# Patient Record
Sex: FEMALE | Race: WHITE | NOT HISPANIC OR LATINO
[De-identification: names, ages, dates, MRNs, and addresses within clinical notes are randomized per-mention and may not be internally consistent; named-entity substitution may affect disease eponyms.]

---

## 2017-01-25 ENCOUNTER — RX RENEWAL (OUTPATIENT)
Age: 57
End: 2017-01-25

## 2017-06-30 ENCOUNTER — OUTPATIENT (OUTPATIENT)
Dept: PREADMISSION | Facility: HOSPITAL | Age: 57
LOS: 1 days | Discharge: HOME | End: 2017-06-30

## 2017-06-30 DIAGNOSIS — R92.8 OTHER ABNORMAL AND INCONCLUSIVE FINDINGS ON DIAGNOSTIC IMAGING OF BREAST: ICD-10-CM

## 2017-07-06 ENCOUNTER — RX RENEWAL (OUTPATIENT)
Age: 57
End: 2017-07-06

## 2017-08-29 ENCOUNTER — APPOINTMENT (OUTPATIENT)
Dept: BREAST CENTER | Facility: CLINIC | Age: 57
End: 2017-08-29
Payer: MEDICAID

## 2017-08-29 VITALS
WEIGHT: 172 LBS | HEIGHT: 66 IN | DIASTOLIC BLOOD PRESSURE: 76 MMHG | SYSTOLIC BLOOD PRESSURE: 122 MMHG | BODY MASS INDEX: 27.64 KG/M2

## 2017-08-29 PROCEDURE — 99212 OFFICE O/P EST SF 10 MIN: CPT

## 2018-01-11 ENCOUNTER — RX RENEWAL (OUTPATIENT)
Age: 58
End: 2018-01-11

## 2018-01-29 ENCOUNTER — OUTPATIENT (OUTPATIENT)
Dept: OUTPATIENT SERVICES | Facility: HOSPITAL | Age: 58
LOS: 1 days | Discharge: HOME | End: 2018-01-29

## 2018-01-29 ENCOUNTER — APPOINTMENT (OUTPATIENT)
Dept: HEMATOLOGY ONCOLOGY | Facility: CLINIC | Age: 58
End: 2018-01-29

## 2018-01-29 VITALS
HEART RATE: 107 BPM | WEIGHT: 186 LBS | TEMPERATURE: 96.1 F | SYSTOLIC BLOOD PRESSURE: 145 MMHG | RESPIRATION RATE: 14 BRPM | HEIGHT: 66 IN | DIASTOLIC BLOOD PRESSURE: 67 MMHG | BODY MASS INDEX: 29.89 KG/M2

## 2018-01-29 DIAGNOSIS — M19.90 UNSPECIFIED OSTEOARTHRITIS, UNSPECIFIED SITE: ICD-10-CM

## 2018-01-29 DIAGNOSIS — Z87.09 PERSONAL HISTORY OF OTHER DISEASES OF THE RESPIRATORY SYSTEM: ICD-10-CM

## 2018-01-29 DIAGNOSIS — Z80.1 FAMILY HISTORY OF MALIGNANT NEOPLASM OF TRACHEA, BRONCHUS AND LUNG: ICD-10-CM

## 2018-01-31 DIAGNOSIS — C50.411 MALIGNANT NEOPLASM OF UPPER-OUTER QUADRANT OF RIGHT FEMALE BREAST: ICD-10-CM

## 2018-03-06 ENCOUNTER — APPOINTMENT (OUTPATIENT)
Dept: BREAST CENTER | Facility: CLINIC | Age: 58
End: 2018-03-06
Payer: MEDICAID

## 2018-03-06 VITALS
SYSTOLIC BLOOD PRESSURE: 136 MMHG | WEIGHT: 188 LBS | HEIGHT: 66 IN | DIASTOLIC BLOOD PRESSURE: 80 MMHG | BODY MASS INDEX: 30.22 KG/M2 | OXYGEN SATURATION: 96 % | HEART RATE: 112 BPM

## 2018-03-06 PROCEDURE — 99213 OFFICE O/P EST LOW 20 MIN: CPT

## 2018-07-10 ENCOUNTER — OUTPATIENT (OUTPATIENT)
Dept: OUTPATIENT SERVICES | Facility: HOSPITAL | Age: 58
LOS: 1 days | Discharge: HOME | End: 2018-07-10

## 2018-07-10 DIAGNOSIS — Z12.31 ENCOUNTER FOR SCREENING MAMMOGRAM FOR MALIGNANT NEOPLASM OF BREAST: ICD-10-CM

## 2018-07-11 DIAGNOSIS — F17.200 NICOTINE DEPENDENCE, UNSPECIFIED, UNCOMPLICATED: ICD-10-CM

## 2018-07-11 DIAGNOSIS — Z78.0 ASYMPTOMATIC MENOPAUSAL STATE: ICD-10-CM

## 2018-07-11 DIAGNOSIS — M89.9 DISORDER OF BONE, UNSPECIFIED: ICD-10-CM

## 2018-07-11 DIAGNOSIS — Z13.820 ENCOUNTER FOR SCREENING FOR OSTEOPOROSIS: ICD-10-CM

## 2018-07-25 ENCOUNTER — RX RENEWAL (OUTPATIENT)
Age: 58
End: 2018-07-25

## 2018-07-30 ENCOUNTER — APPOINTMENT (OUTPATIENT)
Dept: HEMATOLOGY ONCOLOGY | Facility: CLINIC | Age: 58
End: 2018-07-30

## 2018-07-30 ENCOUNTER — OUTPATIENT (OUTPATIENT)
Dept: OUTPATIENT SERVICES | Facility: HOSPITAL | Age: 58
LOS: 1 days | Discharge: HOME | End: 2018-07-30

## 2018-07-30 ENCOUNTER — LABORATORY RESULT (OUTPATIENT)
Age: 58
End: 2018-07-30

## 2018-07-30 VITALS
TEMPERATURE: 97.7 F | DIASTOLIC BLOOD PRESSURE: 67 MMHG | HEART RATE: 109 BPM | WEIGHT: 178 LBS | SYSTOLIC BLOOD PRESSURE: 112 MMHG | HEIGHT: 66 IN | RESPIRATION RATE: 16 BRPM | BODY MASS INDEX: 28.61 KG/M2

## 2018-07-30 DIAGNOSIS — Z87.440 PERSONAL HISTORY OF URINARY (TRACT) INFECTIONS: ICD-10-CM

## 2018-07-31 LAB
ALBUMIN SERPL ELPH-MCNC: 4.2 G/DL
ALP BLD-CCNC: 108 U/L
ALT SERPL-CCNC: 24 U/L
ANION GAP SERPL CALC-SCNC: 20 MMOL/L
APPEARANCE: ABNORMAL
AST SERPL-CCNC: 22 U/L
BILIRUB SERPL-MCNC: <0.2 MG/DL
BILIRUBIN URINE: ABNORMAL
BLOOD URINE: ABNORMAL
BUN SERPL-MCNC: 16 MG/DL
CALCIUM SERPL-MCNC: 10.1 MG/DL
CHLORIDE SERPL-SCNC: 99 MMOL/L
CO2 SERPL-SCNC: 24 MMOL/L
COLOR: NORMAL
CREAT SERPL-MCNC: 0.7 MG/DL
GLUCOSE QUALITATIVE U: NEGATIVE MG/DL
GLUCOSE SERPL-MCNC: 74 MG/DL
HCT VFR BLD CALC: 41.8 %
HGB BLD-MCNC: 13.5 G/DL
KETONES URINE: ABNORMAL
LEUKOCYTE ESTERASE URINE: ABNORMAL
MCHC RBC-ENTMCNC: 28.7 PG
MCHC RBC-ENTMCNC: 32.3 G/DL
MCV RBC AUTO: 88.7 FL
NITRITE URINE: POSITIVE
PH URINE: 6
PLATELET # BLD AUTO: 344 K/UL
PMV BLD: 10.7 FL
POTASSIUM SERPL-SCNC: 4.2 MMOL/L
PROT SERPL-MCNC: 6.9 G/DL
PROTEIN URINE: 100 MG/DL
RBC # BLD: 4.71 M/UL
RBC # FLD: 13.1 %
SODIUM SERPL-SCNC: 143 MMOL/L
SPECIFIC GRAVITY URINE: 1.02
UROBILINOGEN URINE: 0.2 MG/DL (ref 0.2–?)
WBC # FLD AUTO: 13.28 K/UL

## 2018-08-01 ENCOUNTER — RX RENEWAL (OUTPATIENT)
Age: 58
End: 2018-08-01

## 2018-08-01 DIAGNOSIS — N39.0 URINARY TRACT INFECTION, SITE NOT SPECIFIED: ICD-10-CM

## 2018-08-01 DIAGNOSIS — Z79.811 LONG TERM (CURRENT) USE OF AROMATASE INHIBITORS: ICD-10-CM

## 2018-08-01 DIAGNOSIS — C50.411 MALIGNANT NEOPLASM OF UPPER-OUTER QUADRANT OF RIGHT FEMALE BREAST: ICD-10-CM

## 2018-09-25 ENCOUNTER — APPOINTMENT (OUTPATIENT)
Dept: BREAST CENTER | Facility: CLINIC | Age: 58
End: 2018-09-25
Payer: MEDICAID

## 2018-09-25 VITALS
BODY MASS INDEX: 28.61 KG/M2 | HEIGHT: 66 IN | WEIGHT: 178 LBS | OXYGEN SATURATION: 96 % | SYSTOLIC BLOOD PRESSURE: 118 MMHG | DIASTOLIC BLOOD PRESSURE: 78 MMHG | HEART RATE: 100 BPM

## 2018-09-25 PROCEDURE — 99213 OFFICE O/P EST LOW 20 MIN: CPT

## 2019-01-28 ENCOUNTER — APPOINTMENT (OUTPATIENT)
Dept: HEMATOLOGY ONCOLOGY | Facility: CLINIC | Age: 59
End: 2019-01-28

## 2019-01-28 ENCOUNTER — OUTPATIENT (OUTPATIENT)
Dept: OUTPATIENT SERVICES | Facility: HOSPITAL | Age: 59
LOS: 1 days | Discharge: HOME | End: 2019-01-28

## 2019-01-28 VITALS
HEART RATE: 103 BPM | WEIGHT: 183 LBS | SYSTOLIC BLOOD PRESSURE: 124 MMHG | BODY MASS INDEX: 29.41 KG/M2 | TEMPERATURE: 96.3 F | HEIGHT: 66 IN | DIASTOLIC BLOOD PRESSURE: 76 MMHG

## 2019-01-29 DIAGNOSIS — C50.411 MALIGNANT NEOPLASM OF UPPER-OUTER QUADRANT OF RIGHT FEMALE BREAST: ICD-10-CM

## 2019-01-29 DIAGNOSIS — Z79.811 LONG TERM (CURRENT) USE OF AROMATASE INHIBITORS: ICD-10-CM

## 2019-02-04 NOTE — PAST MEDICAL HISTORY
[Postmenopausal] : The patient is postmenopausal [Menarche Age ____] : age at menarche was [unfilled] [Menopause Age____] : age at menopause was [unfilled] [Total Preg ___] : G[unfilled] [Live Births ___] : P[unfilled]  [Age At Live Birth ___] : Age at live birth: [unfilled] [History of Hormone Replacement Treatment] : has no history of hormone replacement treatment [FreeTextEntry5] : none [FreeTextEntry6] : none [FreeTextEntry7] : none [FreeTextEntry8] : none

## 2019-02-04 NOTE — ASSESSMENT
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 \par She is tolerating the treatment well.\par \par PLAN\par Continue Femara, Ca + Vit D\par Advised to stop smoking.\par Mammogram due in July 2019. DEXA scan to be repeated in july 2020.\par RTC in 6M\par Pt seen and examined with \par

## 2019-02-04 NOTE — RESULTS/DATA
[FreeTextEntry1] : \par EXAM: XR BONE DENSITY AXIAL \par \par \par PROCEDURE DATE: 07/10/2018 \par \par \par \par ================================================================= \par  Bone Density Report \par ================================================================= \par \par Age: 57 \par Sex: Female \par Ethnicity: White \par \par ----------------------------------------------------------------- \par \par Indication: postmenopausal; screening for osteoporosis; \[facility] ([place] \par Accession number: [id number] \[facility] \par Bone Density: \par ----------------------------------------------------------------- \par Region BMD T-score Z-score Classification \par ----------------------------------------------------------------- \par AP Spine (L1-L4) 0.874 -1.6 -0.2 Osteopenia \par Femoral Neck (Left) 0.790 -0.5 0.7 Normal \par Total Hip (Left) 0.887 -0.5 0.5 Normal \par -----------------------------------------------------------------.\par \par EXAM: MG MAMMO SCREEN BI \par \par \par PROCEDURE DATE: 07/10/2018 \par \par \par \par INTERPRETATION: HISTORY: \par Patient is 57 years old and is seen for screening. The patient has a \par history of right lumpectomy at age 55 - malignant. The patient has no \par family history of breast cancer. \par \par CLINICAL BREAST EXAM: \par The patient reports her last clinical breast exam was performed within the \par past month. \par \par FILMS COMPARED: \par The present examination has been compared to prior imaging studies performed \par at [facility] on 04/28/2016, 12/13/2016 and \par 06/30/2017. \par \par MAMMOGRAM FINDINGS: \par The following mammographic views were obtained: bilateral craniocaudal and \par bilateral mediolateral oblique. Computer-aided detection was utilized in \par the interpretation of this examination. \par \par There are scattered areas of fibroglandular density. \par \par There is an area of benign architectural distortion corresponding to the \par site of surgery and consistent with post operative fibrosis seen in the \par right breast. \par \par No suspicious masses, calcifications or other abnormalities are seen. \par \par When compared to prior studies there is no significant change. \par \par IMPRESSION: \par There is no evidence of malignancy. \par \par RECOMMENDATION: \par Unless otherwise indicated by clinical findings, annual screening \par mammography recommended. \par \par ASSESSMENT: \par BI-RADS Category 2: Benign\par

## 2019-02-04 NOTE — REVIEW OF SYSTEMS
[Dysuria] : dysuria [Negative] : Allergic/Immunologic [FreeTextEntry2] : last colonoscopy was in 2016, last GYn exam >10 years ago, refusing to go

## 2019-02-04 NOTE — HISTORY OF PRESENT ILLNESS
[Disease: _____________________] : Disease: [unfilled] [T: ___] : T[unfilled] [N: ___] : N[unfilled] [M: ___] : M[unfilled] [AJCC Stage: ____] : AJCC Stage: [unfilled] [de-identified] : Patient is a 57 year old year-old postmenopausal female transferring her care to me. \par \par She has history of ER and CO positive 0.6 mm well, differentiated, microinvasive ductal carcinoma  ER/CO (+), HER2 (-). status post surgery currently on adjuvant Femara started in July 2015. \par She is s/p Right NLOC/SNB/RF Spect/XOFT 06/15/15 - 0/1 (-); DCIS intermed grade, no residual IDC; (-) margins\par Patient is taking calcium and vitamin D .\par She is refusing Pap smears.\par No FHx breast/ovarian cancer.\par  [de-identified] : IDC [de-identified] : ER and OK positive, and her-2 negative [de-identified] : < 1mm (microinvasive)\par UOQ [de-identified] : She has not been here since 11/18/16. However she states she has been compliant with Femara.\par She does not exercise.\par Her appetite is good.\par She denies any adverse events.\par \par 7/30/18:\par Doing well. Mammo in July 2018 was normal. DEXA in July 2018 showed osteopenia. \par C/o burning and pain while passing urine. Started few days ago, resolved by itself and recurred last night. Did not take any Abx for UTI. \par Compliant with Letrozole. Denies fever, nausea, vomiting, chest pain, SOB, abdominal pain, bowel problems.\par Colonoscopy done in 2017 and it was normal. \par \par 01/28/2019\par Patient is here for a follow up visit. \par C/O of weight gain and increased appetite. But otherwise tolerating letrozole well. \par Mammogram due in  july 2019. \par Denies fever, nausea, vomiting, chest pain, SOB, abdominal pain, bowel problems.\par Colonoscopy done in 2017 and it was normal.

## 2019-05-13 ENCOUNTER — APPOINTMENT (OUTPATIENT)
Dept: BREAST CENTER | Facility: CLINIC | Age: 59
End: 2019-05-13

## 2019-05-14 ENCOUNTER — APPOINTMENT (OUTPATIENT)
Dept: BREAST CENTER | Facility: CLINIC | Age: 59
End: 2019-05-14

## 2019-06-17 ENCOUNTER — FORM ENCOUNTER (OUTPATIENT)
Age: 59
End: 2019-06-17

## 2019-06-18 ENCOUNTER — OUTPATIENT (OUTPATIENT)
Dept: OUTPATIENT SERVICES | Facility: HOSPITAL | Age: 59
LOS: 1 days | Discharge: HOME | End: 2019-06-18
Payer: MEDICAID

## 2019-06-18 DIAGNOSIS — N63.20 UNSPECIFIED LUMP IN THE LEFT BREAST, UNSPECIFIED QUADRANT: ICD-10-CM

## 2019-06-18 PROCEDURE — G0279: CPT | Mod: 26

## 2019-06-18 PROCEDURE — 77066 DX MAMMO INCL CAD BI: CPT | Mod: 26

## 2019-06-18 PROCEDURE — 76642 ULTRASOUND BREAST LIMITED: CPT | Mod: 26,LT

## 2019-06-18 PROCEDURE — 77062 BREAST TOMOSYNTHESIS BI: CPT | Mod: 26

## 2019-07-08 ENCOUNTER — APPOINTMENT (OUTPATIENT)
Dept: HEMATOLOGY ONCOLOGY | Facility: CLINIC | Age: 59
End: 2019-07-08
Payer: MEDICAID

## 2019-07-08 ENCOUNTER — LABORATORY RESULT (OUTPATIENT)
Age: 59
End: 2019-07-08

## 2019-07-08 ENCOUNTER — OUTPATIENT (OUTPATIENT)
Dept: OUTPATIENT SERVICES | Facility: HOSPITAL | Age: 59
LOS: 1 days | Discharge: HOME | End: 2019-07-08

## 2019-07-08 VITALS
HEART RATE: 89 BPM | HEIGHT: 66 IN | BODY MASS INDEX: 28.61 KG/M2 | DIASTOLIC BLOOD PRESSURE: 67 MMHG | WEIGHT: 178 LBS | TEMPERATURE: 96.2 F | SYSTOLIC BLOOD PRESSURE: 104 MMHG | RESPIRATION RATE: 16 BRPM

## 2019-07-08 LAB
HCT VFR BLD CALC: 44.3 %
HGB BLD-MCNC: 14.1 G/DL
MCHC RBC-ENTMCNC: 29.3 PG
MCHC RBC-ENTMCNC: 31.8 G/DL
MCV RBC AUTO: 91.9 FL
PLATELET # BLD AUTO: 401 K/UL
PMV BLD: 10.3 FL
RBC # BLD: 4.82 M/UL
RBC # FLD: 13 %
WBC # FLD AUTO: 9.57 K/UL

## 2019-07-08 PROCEDURE — 99214 OFFICE O/P EST MOD 30 MIN: CPT

## 2019-07-08 NOTE — PHYSICAL EXAM
[Fully active, able to carry on all pre-disease performance without restriction] : Status 0 - Fully active, able to carry on all pre-disease performance without restriction [Normal] : bilateral breasts without nipple retraction, skin dimpling or palpable masses; the bilateral axillae are without adenopathy

## 2019-07-09 LAB
ALBUMIN SERPL ELPH-MCNC: 4.3 G/DL
ALP BLD-CCNC: 94 U/L
ALT SERPL-CCNC: 29 U/L
ANION GAP SERPL CALC-SCNC: 10 MMOL/L
AST SERPL-CCNC: 21 U/L
BILIRUB SERPL-MCNC: <0.2 MG/DL
BUN SERPL-MCNC: 14 MG/DL
CALCIUM SERPL-MCNC: 9.5 MG/DL
CHLORIDE SERPL-SCNC: 102 MMOL/L
CO2 SERPL-SCNC: 28 MMOL/L
CREAT SERPL-MCNC: 0.8 MG/DL
FERRITIN SERPL-MCNC: 53 NG/ML
GLUCOSE SERPL-MCNC: 81 MG/DL
IRON SATN MFR SERPL: 67 %
IRON SERPL-MCNC: 248 UG/DL
POTASSIUM SERPL-SCNC: 4.5 MMOL/L
PROT SERPL-MCNC: 6.9 G/DL
SODIUM SERPL-SCNC: 140 MMOL/L
TIBC SERPL-MCNC: 372 UG/DL
UIBC SERPL-MCNC: 124 UG/DL

## 2019-07-12 NOTE — HISTORY OF PRESENT ILLNESS
[Disease: _____________________] : Disease: [unfilled] [T: ___] : T[unfilled] [N: ___] : N[unfilled] [M: ___] : M[unfilled] [AJCC Stage: ____] : AJCC Stage: [unfilled] [de-identified] : IDC [de-identified] : Patient is a 57 year old year-old postmenopausal female transferring her care to me. \par \par She has history of ER and OK positive 0.6 mm well, differentiated, microinvasive ductal carcinoma  ER/OK (+), HER2 (-). status post surgery currently on adjuvant Femara started in July 2015. \par She is s/p Right NLOC/SNB/RF Spect/XOFT 06/15/15 - 0/1 (-); DCIS intermed grade, no residual IDC; (-) margins\par Patient is taking calcium and vitamin D .\par She is refusing Pap smears.\par No FHx breast/ovarian cancer.\par  [de-identified] : < 1mm (microinvasive)\par UOQ [de-identified] : ER and MS positive, and her-2 negative [de-identified] : She has not been here since 11/18/16. However she states she has been compliant with Femara.\par She does not exercise.\par Her appetite is good.\par She denies any adverse events.\par \par 7/30/18:\par Doing well. Mammo in July 2018 was normal. DEXA in July 2018 showed osteopenia. \par C/o burning and pain while passing urine. Started few days ago, resolved by itself and recurred last night. Did not take any Abx for UTI. \par Compliant with Letrozole. Denies fever, nausea, vomiting, chest pain, SOB, abdominal pain, bowel problems.\par Colonoscopy done in 2017 and it was normal. \par \par 01/28/2019\par Patient is here for a follow up visit. \par C/O of weight gain and increased appetite. But otherwise tolerating letrozole well. \par Mammogram due in  july 2019. \par Denies fever, nausea, vomiting, chest pain, SOB, abdominal pain, bowel problems.\par Colonoscopy done in 2017 and it was normal. \par \par 7/8/19\par pt is here for follow up.\par Denies fever, nausea, vomiting, chest pain, SOB, abdominal pain, bowel problems.\par she had felt a lump under her left axilla, had mamamo/usg done neg as noted below.\par compliant with Femara.\par says she was diagnosed with iron deficiency last October??.\par states she was advised IV iron but has been taking po iron.\par Not taking Ca + D\par After extensive questioning pt finally admitted that she has been having vaginal spotting for several months.\par She has not seen a GYN in a long time

## 2019-07-12 NOTE — RESULTS/DATA
[FreeTextEntry1] :  US BREAST LIMITED LT \par EXAM: MG MAMMO DIAG W DARREN BI# \par \par \par PROCEDURE DATE: 06/18/2019 \par \par \par \par INTERPRETATION: Patient presents with an area of focal palpable abnormality \par in the left axilla. \par \par The patient reports her last clinical breast examination was performed \par within the last year. \par \par Spot magnification imaging of the symptomatic area was performed in addition \par to routine mammographic projections. 3D tomosynthesis images were obtained \par as well. \par \par Computer-aided detection was utilized in the interpretation of this \par examination. \par \par Comparison is made to multiple prior exams dating back to 4/6/2015. \par \par Breast composition:There are scattered areas of fibroglandular density. \par \par There are no suspicious masses, areas of architectural distortion or cluster \par of microcalcifications in either breast. The spot magnification views of the \par symptomatic area show no suspicious findings. Stable postlumpectomy changes \par with associated fat necrosis are noted in the right upper outer quadrant. \par \par Targeted left Breast Sonogram: \par \par 8 x 5 x 3 mm hypoechoic area is noted in the skin in the region of palpable \par concern within the left axilla. This may reflect a sebaceous cyst. \par \par IMPRESSION: \par \par Hypoechoic area in the left axilla is probably benign. Follow-up ultrasound \par in 3 months is recommended. \par \par Postlumpectomy changes in the right breast are benign. \par \par Recommendation: Follow-up ultrasound in 3 months. \par \par BI-RADS category 3: Probably Benign \par \par The findings and recommendations were discussed with the patient and all \par questions were answered. \par \par \par \par

## 2019-07-12 NOTE — ASSESSMENT
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 \par She is tolerating the treatment well.\par H/o Iron deficiency \par States she has vaginal bleeding\par \par PLAN\par Continue Femara, Ca + Vit D\par Advised to stop smoking.\par USG breast due in Sept 2019. DEXA scan to be repeated in july 2020.\par pelvic USG and GYN eval ASAP.\par Labs ordered today\par RTC in 2M\par \par

## 2019-07-12 NOTE — PAST MEDICAL HISTORY
[Menarche Age ____] : age at menarche was [unfilled] [Postmenopausal] : The patient is postmenopausal [Total Preg ___] : G[unfilled] [Menopause Age____] : age at menopause was [unfilled] [Age At Live Birth ___] : Age at live birth: [unfilled] [Live Births ___] : P[unfilled]  [FreeTextEntry5] : none [History of Hormone Replacement Treatment] : has no history of hormone replacement treatment [FreeTextEntry6] : none [FreeTextEntry8] : none [FreeTextEntry7] : none

## 2019-07-15 DIAGNOSIS — Z79.811 LONG TERM (CURRENT) USE OF AROMATASE INHIBITORS: ICD-10-CM

## 2019-07-15 DIAGNOSIS — N93.9 ABNORMAL UTERINE AND VAGINAL BLEEDING, UNSPECIFIED: ICD-10-CM

## 2019-07-15 DIAGNOSIS — C50.411 MALIGNANT NEOPLASM OF UPPER-OUTER QUADRANT OF RIGHT FEMALE BREAST: ICD-10-CM

## 2019-07-16 ENCOUNTER — APPOINTMENT (OUTPATIENT)
Dept: BREAST CENTER | Facility: CLINIC | Age: 59
End: 2019-07-16
Payer: MEDICAID

## 2019-07-16 VITALS
TEMPERATURE: 98.5 F | SYSTOLIC BLOOD PRESSURE: 134 MMHG | HEIGHT: 66 IN | DIASTOLIC BLOOD PRESSURE: 86 MMHG | BODY MASS INDEX: 28.61 KG/M2 | WEIGHT: 178 LBS

## 2019-07-16 PROCEDURE — 99213 OFFICE O/P EST LOW 20 MIN: CPT

## 2019-07-17 NOTE — DATA REVIEWED
[FreeTextEntry1] : B/L Dx Mammo & Left Sono - 06/18/19:\par Breast composition:There are scattered areas of fibroglandular density. \par \par There are no suspicious masses, areas of architectural distortion or cluster \par of microcalcifications in either breast. The spot magnification views of the \par symptomatic area show no suspicious findings. Stable postlumpectomy changes \par with associated fat necrosis are noted in the right upper outer quadrant. \par \par Targeted left Breast Sonogram: \par \par 8 x 5 x 3 mm hypoechoic area is noted in the skin in the region of palpable \par concern within the left axilla. This may reflect a sebaceous cyst. \par \par IMPRESSION: \par \par Hypoechoic area in the left axilla is probably benign. Follow-up ultrasound \par in 3 months is recommended. \par \par Postlumpectomy changes in the right breast are benign. \par \par Recommendation: Follow-up ultrasound in 3 months. \par \par BI-RADS category 3: Probably Benign

## 2019-07-17 NOTE — ASSESSMENT
[FreeTextEntry1] : JUAN ANTONIO is a rio 58 year old patient who presented today in follow up for a history of right breast cancer. \par She has been doing well with no new breast related complaints. \par Imaging was done recently, which revealed stable postlumpectomy changes with associated fat necrosis are noted in the right upper outer quadrant; 8 x 5 x 3 mm hypoechoic area is noted in the skin in the region of palpable concern within the left axilla. This may reflect a sebaceous cyst, as detailed above. \par Physical exam shows small sebaceous cyst noted in left axilla (correlates with imaging); \par no evidence of infection.\par \par Follow-up imaging with a left breast/axilla sonogram will be ordered for September 2019,\par and that will be scheduled today. \par All questions answered. She knows to call with any concerns.\par She will return for follow-up and clinical breast exam in six months with Dr. Auguste.\par She will continue periodic follow-up with medical oncology as well. \par As per recent medical oncology note, pt has episodes of vaginal bleeding - currently being evaluated.

## 2019-07-17 NOTE — PHYSICAL EXAM
[Normocephalic] : normocephalic [Atraumatic] : atraumatic [No dominant masses] : no dominant masses in right breast  [No dominant masses] : no dominant masses left breast [No Nipple Discharge] : no left nipple discharge [No Rashes] : no rashes [No Ulceration] : no ulceration [Breast Nipple Inversion] : nipples not inverted [Breast Nipple Retraction] : nipples not retracted [de-identified] : well healed surgical scars.\par s/p NLOC / SNB.  [de-identified] : No axillary lymphadenopathy appreciated. [de-identified] : No axillary lymphadenopathy appreciated.

## 2019-07-17 NOTE — HISTORY OF PRESENT ILLNESS
[FreeTextEntry1] : Patient with Right focus of well to mod diff IDC (0.6 mm)/DCIS intermed on Stereo 4/6/15; RUOQ (tophat).  ER/[place] (+), HER2 (-).\par No FHx breast/ovarian cancer.\par s/p Right NLOC/SNB/RF Spect/XOFT 06/15/15 - 0/1 (-); DCIS intermed grade, no residual IDC; (-) margins.\par Dr. Hall - Letrozole. \par \par JUAN ANTONIO OSHEA is a 58 year old female patient who presents today in follow up for history of right breast cancer.\par Since her last visit, she has no new breast related concerns. \par Imaging was done on 06/18/19, which revealed stable postlumpectomy changes with associated fat necrosis are noted in the right upper outer quadrant; 8 x 5 x 3 mm hypoechoic area is noted in the skin in the region of palpable concern within the left axilla. This may reflect a sebaceous cyst. .\par \par She presents today for evaluation and imaging review.

## 2019-09-03 ENCOUNTER — RECORD ABSTRACTING (OUTPATIENT)
Age: 59
End: 2019-09-03

## 2019-09-09 ENCOUNTER — APPOINTMENT (OUTPATIENT)
Dept: HEMATOLOGY ONCOLOGY | Facility: CLINIC | Age: 59
End: 2019-09-09

## 2020-01-15 ENCOUNTER — APPOINTMENT (OUTPATIENT)
Dept: BREAST CENTER | Facility: CLINIC | Age: 60
End: 2020-01-15

## 2020-02-10 ENCOUNTER — APPOINTMENT (OUTPATIENT)
Dept: HEMATOLOGY ONCOLOGY | Facility: CLINIC | Age: 60
End: 2020-02-10
Payer: MEDICAID

## 2020-02-10 VITALS
WEIGHT: 180 LBS | SYSTOLIC BLOOD PRESSURE: 134 MMHG | TEMPERATURE: 98.5 F | BODY MASS INDEX: 28.93 KG/M2 | HEIGHT: 66 IN | HEART RATE: 111 BPM | DIASTOLIC BLOOD PRESSURE: 71 MMHG

## 2020-02-10 PROCEDURE — 99214 OFFICE O/P EST MOD 30 MIN: CPT

## 2020-02-10 NOTE — HISTORY OF PRESENT ILLNESS
[T: ___] : T[unfilled] [Disease: _____________________] : Disease: [unfilled] [N: ___] : N[unfilled] [M: ___] : M[unfilled] [AJCC Stage: ____] : AJCC Stage: [unfilled] [de-identified] : Patient is a 57 year old year-old postmenopausal female transferring her care to me. \par \par She has history of ER and OR positive 0.6 mm well, differentiated, microinvasive ductal carcinoma  ER/OR (+), HER2 (-). status post surgery currently on adjuvant Femara started in July 2015. \par She is s/p Right NLOC/SNB/RF Spect/XOFT 06/15/15 - 0/1 (-); DCIS intermed grade, no residual IDC; (-) margins\par Patient is taking calcium and vitamin D .\par She is refusing Pap smears.\par No FHx breast/ovarian cancer.\par  [de-identified] : IDC [de-identified] : ER and NV positive, and her-2 negative [de-identified] : < 1mm (microinvasive)\par UOQ [de-identified] : She has not been here since 11/18/16. However she states she has been compliant with Femara.\par She does not exercise.\par Her appetite is good.\par She denies any adverse events.\par \par 7/30/18:\par Doing well. Mammo in July 2018 was normal. DEXA in July 2018 showed osteopenia. \par C/o burning and pain while passing urine. Started few days ago, resolved by itself and recurred last night. Did not take any Abx for UTI. \par Compliant with Letrozole. Denies fever, nausea, vomiting, chest pain, SOB, abdominal pain, bowel problems.\par Colonoscopy done in 2017 and it was normal. \par \par 01/28/2019\par Patient is here for a follow up visit. \par C/O of weight gain and increased appetite. But otherwise tolerating letrozole well. \par Mammogram due in  july 2019. \par Denies fever, nausea, vomiting, chest pain, SOB, abdominal pain, bowel problems.\par Colonoscopy done in 2017 and it was normal. \par \par 7/8/19\par pt is here for follow up.\par Denies fever, nausea, vomiting, chest pain, SOB, abdominal pain, bowel problems.\par she had felt a lump under her left axilla, had mamamo/usg done neg as noted below.\par compliant with Femara.\par says she was diagnosed with iron deficiency last October??.\par states she was advised IV iron but has been taking po iron.\par Not taking Ca + D\par After extensive questioning pt finally admitted that she has been having vaginal spotting for several months.\par She has not seen a GYN in a long time\par \par 2/10/20\par  Patient here for follow up.  She denies any new complaints.  Patient denies any new palpable breast lumps or pain, denies skin changes, denies nipple discharge.  Patient denies cough, shortness of breath, denies fever, denies bone pain.\par compliant with letrozole.\par Still has vaginal spotting off and on.\par Did not go for USG of pelvis or GYN eval as advised previously.\par Pt states that this is not her first priority.\par Continues to smoke.\par No abdominal pain\par

## 2020-02-10 NOTE — RESULTS/DATA
[FreeTextEntry1] : EXAM: US BREAST LIMITED LT \par EXAM: MG MAMMO DIAG W DARREN BI# \par PROCEDURE DATE: 06/18/2019 \par INTERPRETATION: Patient presents with an area of focal palpable abnormality in the left axilla. \par The patient reports her last clinical breast examination was performed within the last year. \par Spot magnification imaging of the symptomatic area was performed in addition to routine mammographic projections. 3D \par tomosynthesis images were obtained as well. \par Computer-aided detection was utilized in the interpretation of this examination. \par Comparison is made to multiple prior exams dating back to 4/6/2015. \par Breast composition:There are scattered areas of fibroglandular density. \par There are no suspicious masses, areas of architectural distortion or cluster of microcalcifications in either breast. The spot \par magnification views of the symptomatic area show no suspicious findings. Stable postlumpectomy changes with \par associated fat necrosis are noted in the right upper outer quadrant. \par Targeted left Breast Sonogram: \par 8 x 5 x 3 mm hypoechoic area is noted in the skin in the region of palpable concern within the left axilla. This may reflect a \par sebaceous cyst. \par IMPRESSION: \par Hypoechoic area in the left axilla is probably benign. Follow-up ultrasound in 3 months is recommended. \par Postlumpectomy changes in the right breast are benign. \par Recommendation: Follow-up ultrasound in 3 months. \par BI-RADS category 3: Probably Benign

## 2020-02-10 NOTE — PAST MEDICAL HISTORY
[Postmenopausal] : The patient is postmenopausal [Menarche Age ____] : age at menarche was [unfilled] [Menopause Age____] : age at menopause was [unfilled] [Age At Live Birth ___] : Age at live birth: [unfilled] [Live Births ___] : P[unfilled]  [Total Preg ___] : G[unfilled] [History of Hormone Replacement Treatment] : has no history of hormone replacement treatment [FreeTextEntry6] : none [FreeTextEntry5] : none [FreeTextEntry7] : none [FreeTextEntry8] : none

## 2020-02-10 NOTE — ASSESSMENT
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 \par She is tolerating the treatment well.\par H/o Iron deficiency \par States she has vaginal bleeding\par \par PLAN\par Continue Femara, Ca + Vit D.\par The side effects from such treatment were discussed in detail including but not limited to hot flashes, weight gain, hair thinning, arthralgia, myalgia, bone loss, increased risk of osteoporotic fractures and thrombo-embolism.\par She will take Ca + VIt D daily while on AI.\par Her compliance and any side effects from such treatment were assessed at today's visit\par \par Advised to stop smoking.\par  DEXA scan to be repeated in july 2020.\par reminded to go for breast USG again.\par PT IS VERY NON COMPLIANT\par pelvic USG and GYN eval ASAP.\par Labs ordered today\par RTC in 2M\par \par

## 2020-02-14 ENCOUNTER — LABORATORY RESULT (OUTPATIENT)
Age: 60
End: 2020-02-14

## 2020-02-14 ENCOUNTER — APPOINTMENT (OUTPATIENT)
Dept: GYNECOLOGIC ONCOLOGY | Facility: CLINIC | Age: 60
End: 2020-02-14
Payer: MEDICAID

## 2020-02-14 ENCOUNTER — OUTPATIENT (OUTPATIENT)
Dept: OUTPATIENT SERVICES | Facility: HOSPITAL | Age: 60
LOS: 1 days | Discharge: HOME | End: 2020-02-14

## 2020-02-14 DIAGNOSIS — N95.0 POSTMENOPAUSAL BLEEDING: ICD-10-CM

## 2020-02-14 DIAGNOSIS — N93.9 ABNORMAL UTERINE AND VAGINAL BLEEDING, UNSPECIFIED: ICD-10-CM

## 2020-02-14 PROCEDURE — 58100 BIOPSY OF UTERUS LINING: CPT

## 2020-02-14 PROCEDURE — 99205 OFFICE O/P NEW HI 60 MIN: CPT | Mod: 25

## 2020-02-14 NOTE — ASSESSMENT
[FreeTextEntry1] : 59 P3, smoker, h/o ER/VT pos breast cancer, with 1 year of PMB\par -sent for medical clearance\par -sched for Hyst D&C 3/2\par -f/u Pap/Embx\par -advised to stop smoking\par -discussed with the patient the possible outcomes of the Embx and D&C, all questions answered

## 2020-02-14 NOTE — PHYSICAL EXAM
[Abnormal] : Cervix: Abnormal [Normal] : Bimanual Exam: Normal [de-identified] : dark red blood at the cervix, no blanca bleeding [de-identified] : 5cc dark red blood in the vagina

## 2020-02-14 NOTE — HISTORY OF PRESENT ILLNESS
[FreeTextEntry1] : This is a 59 year-old smoking, non-compliant postmenopausal female with microinvasive ER/IN + breast cancer on adjuvant Femara since July 2015\par \par CC: vaginal spotting\par \par Referred by: Dr. Hall\par \par Pathology: \par \par Last Pap Smear: 20 years ago\par Last mammogram: 6/2019 BIRADS 3\par Last colonoscopy: \par \par Imaging:\par \par The patient comes today to discuss management recommendations.\par She has no complaints of pelvic pain; presently she is having bleeding, she states for 1 year, pink discharge. Denies blanca red blood. LMP was at menopause 10-12 years ago. \par She has normal appetite and normal oral intake. She is a smoker, trying to quit.\par She has no stress urinary incontinence, no urge incontinence, no hematuria\par She has regular bowel movements and no blood per rectum

## 2020-02-14 NOTE — PROCEDURE
[Endometrial Biopsy] : an endometrial biopsy [Cervical Pap] : cervical pap smear  [Postmenopausal Bleeding] : postmenopausal bleeding [Written consent] : written consent was obtained prior to the procedure and is detailed in the patient's record [Patient] : the patient [Yes] : the specimen was sent to pathology [None] : none [No Complications] : none [Tolerated Well] : the patient tolerated the procedure well [FreeTextEntry1] : Pt requested to stop after the pipelle was placed in the uterus, scant tissue obtained.

## 2020-02-17 ENCOUNTER — LABORATORY RESULT (OUTPATIENT)
Age: 60
End: 2020-02-17

## 2020-02-20 LAB — HPV HIGH+LOW RISK DNA PNL CVX: DETECTED

## 2020-02-24 ENCOUNTER — OUTPATIENT (OUTPATIENT)
Dept: OUTPATIENT SERVICES | Facility: HOSPITAL | Age: 60
LOS: 1 days | Discharge: HOME | End: 2020-02-24
Payer: MEDICAID

## 2020-02-24 VITALS
WEIGHT: 181.44 LBS | SYSTOLIC BLOOD PRESSURE: 116 MMHG | OXYGEN SATURATION: 94 % | DIASTOLIC BLOOD PRESSURE: 79 MMHG | HEIGHT: 66 IN | HEART RATE: 97 BPM | RESPIRATION RATE: 18 BRPM | TEMPERATURE: 98 F

## 2020-02-24 DIAGNOSIS — Z90.49 ACQUIRED ABSENCE OF OTHER SPECIFIED PARTS OF DIGESTIVE TRACT: Chronic | ICD-10-CM

## 2020-02-24 DIAGNOSIS — Z01.818 ENCOUNTER FOR OTHER PREPROCEDURAL EXAMINATION: ICD-10-CM

## 2020-02-24 DIAGNOSIS — Z98.890 OTHER SPECIFIED POSTPROCEDURAL STATES: Chronic | ICD-10-CM

## 2020-02-24 DIAGNOSIS — N95.0 POSTMENOPAUSAL BLEEDING: ICD-10-CM

## 2020-02-24 LAB
ALBUMIN SERPL ELPH-MCNC: 4.5 G/DL — SIGNIFICANT CHANGE UP (ref 3.5–5.2)
ALP SERPL-CCNC: 96 U/L — SIGNIFICANT CHANGE UP (ref 30–115)
ALT FLD-CCNC: 19 U/L — SIGNIFICANT CHANGE UP (ref 0–41)
ANION GAP SERPL CALC-SCNC: 15 MMOL/L — HIGH (ref 7–14)
APPEARANCE UR: CLEAR — SIGNIFICANT CHANGE UP
APTT BLD: 27.3 SEC — SIGNIFICANT CHANGE UP (ref 27–39.2)
AST SERPL-CCNC: 16 U/L — SIGNIFICANT CHANGE UP (ref 0–41)
BACTERIA # UR AUTO: ABNORMAL
BASOPHILS # BLD AUTO: 0.05 K/UL — SIGNIFICANT CHANGE UP (ref 0–0.2)
BASOPHILS NFR BLD AUTO: 0.7 % — SIGNIFICANT CHANGE UP (ref 0–1)
BILIRUB SERPL-MCNC: <0.2 MG/DL — SIGNIFICANT CHANGE UP (ref 0.2–1.2)
BILIRUB UR-MCNC: NEGATIVE — SIGNIFICANT CHANGE UP
BUN SERPL-MCNC: 15 MG/DL — SIGNIFICANT CHANGE UP (ref 10–20)
CALCIUM SERPL-MCNC: 10.6 MG/DL — HIGH (ref 8.5–10.1)
CHLORIDE SERPL-SCNC: 101 MMOL/L — SIGNIFICANT CHANGE UP (ref 98–110)
CO2 SERPL-SCNC: 27 MMOL/L — SIGNIFICANT CHANGE UP (ref 17–32)
COLOR SPEC: YELLOW — SIGNIFICANT CHANGE UP
CREAT SERPL-MCNC: 0.7 MG/DL — SIGNIFICANT CHANGE UP (ref 0.7–1.5)
DIFF PNL FLD: ABNORMAL
EOSINOPHIL # BLD AUTO: 0.48 K/UL — SIGNIFICANT CHANGE UP (ref 0–0.7)
EOSINOPHIL NFR BLD AUTO: 6.3 % — SIGNIFICANT CHANGE UP (ref 0–8)
EPI CELLS # UR: 1 /HPF — SIGNIFICANT CHANGE UP (ref 0–5)
GLUCOSE SERPL-MCNC: 92 MG/DL — SIGNIFICANT CHANGE UP (ref 70–99)
GLUCOSE UR QL: NEGATIVE — SIGNIFICANT CHANGE UP
HCT VFR BLD CALC: 43.7 % — SIGNIFICANT CHANGE UP (ref 37–47)
HGB BLD-MCNC: 14.4 G/DL — SIGNIFICANT CHANGE UP (ref 12–16)
HYALINE CASTS # UR AUTO: 19 /LPF — HIGH (ref 0–7)
IMM GRANULOCYTES NFR BLD AUTO: 0.3 % — SIGNIFICANT CHANGE UP (ref 0.1–0.3)
INR BLD: 0.89 RATIO — SIGNIFICANT CHANGE UP (ref 0.65–1.3)
KETONES UR-MCNC: NEGATIVE — SIGNIFICANT CHANGE UP
LEUKOCYTE ESTERASE UR-ACNC: ABNORMAL
LYMPHOCYTES # BLD AUTO: 2.3 K/UL — SIGNIFICANT CHANGE UP (ref 1.2–3.4)
LYMPHOCYTES # BLD AUTO: 30.1 % — SIGNIFICANT CHANGE UP (ref 20.5–51.1)
MCHC RBC-ENTMCNC: 29.9 PG — SIGNIFICANT CHANGE UP (ref 27–31)
MCHC RBC-ENTMCNC: 33 G/DL — SIGNIFICANT CHANGE UP (ref 32–37)
MCV RBC AUTO: 90.7 FL — SIGNIFICANT CHANGE UP (ref 81–99)
MONOCYTES # BLD AUTO: 0.46 K/UL — SIGNIFICANT CHANGE UP (ref 0.1–0.6)
MONOCYTES NFR BLD AUTO: 6 % — SIGNIFICANT CHANGE UP (ref 1.7–9.3)
NEUTROPHILS # BLD AUTO: 4.33 K/UL — SIGNIFICANT CHANGE UP (ref 1.4–6.5)
NEUTROPHILS NFR BLD AUTO: 56.6 % — SIGNIFICANT CHANGE UP (ref 42.2–75.2)
NITRITE UR-MCNC: POSITIVE
NRBC # BLD: 0 /100 WBCS — SIGNIFICANT CHANGE UP (ref 0–0)
PH UR: 6.5 — SIGNIFICANT CHANGE UP (ref 5–8)
PLATELET # BLD AUTO: 360 K/UL — SIGNIFICANT CHANGE UP (ref 130–400)
POTASSIUM SERPL-MCNC: 4.7 MMOL/L — SIGNIFICANT CHANGE UP (ref 3.5–5)
POTASSIUM SERPL-SCNC: 4.7 MMOL/L — SIGNIFICANT CHANGE UP (ref 3.5–5)
PROT SERPL-MCNC: 7.1 G/DL — SIGNIFICANT CHANGE UP (ref 6–8)
PROT UR-MCNC: ABNORMAL
PROTHROM AB SERPL-ACNC: 10.2 SEC — SIGNIFICANT CHANGE UP (ref 9.95–12.87)
RBC # BLD: 4.82 M/UL — SIGNIFICANT CHANGE UP (ref 4.2–5.4)
RBC # FLD: 12.3 % — SIGNIFICANT CHANGE UP (ref 11.5–14.5)
RBC CASTS # UR COMP ASSIST: 2 /HPF — SIGNIFICANT CHANGE UP (ref 0–4)
SODIUM SERPL-SCNC: 143 MMOL/L — SIGNIFICANT CHANGE UP (ref 135–146)
SP GR SPEC: 1.03 — HIGH (ref 1.01–1.02)
UROBILINOGEN FLD QL: SIGNIFICANT CHANGE UP
WBC # BLD: 7.64 K/UL — SIGNIFICANT CHANGE UP (ref 4.8–10.8)
WBC # FLD AUTO: 7.64 K/UL — SIGNIFICANT CHANGE UP (ref 4.8–10.8)
WBC UR QL: 155 /HPF — HIGH (ref 0–5)

## 2020-02-24 PROCEDURE — 71046 X-RAY EXAM CHEST 2 VIEWS: CPT | Mod: 26

## 2020-02-24 PROCEDURE — 93010 ELECTROCARDIOGRAM REPORT: CPT

## 2020-02-24 RX ORDER — LETROZOLE 2.5 MG/1
1 TABLET, FILM COATED ORAL
Qty: 0 | Refills: 0 | DISCHARGE

## 2020-02-24 NOTE — H&P PST ADULT - NSICDXPASTMEDICALHX_GEN_ALL_CORE_FT
PAST MEDICAL HISTORY:  Asthma last attack many yrs ago    Breast cancer right- s/p radiation and lumpectomy    COPD (chronic obstructive pulmonary disease)     OA (osteoarthritis)     Seasonal allergies

## 2020-02-24 NOTE — H&P PST ADULT - REASON FOR ADMISSION
60 Y/O FEMALE HERE FOR PRE-ADMISSION SURGICAL TESTING. PATIENT REPORTS VAGINAL BLEEDING FOR A FEW YEARS.  NOW FOR SCHEDULED PROCEDURE.

## 2020-02-26 DIAGNOSIS — N95.0 POSTMENOPAUSAL BLEEDING: ICD-10-CM

## 2020-02-26 DIAGNOSIS — N93.9 ABNORMAL UTERINE AND VAGINAL BLEEDING, UNSPECIFIED: ICD-10-CM

## 2020-02-28 NOTE — ASU PATIENT PROFILE, ADULT - PMH
Asthma  last attack many yrs ago  Breast cancer  right- s/p radiation and lumpectomy  COPD (chronic obstructive pulmonary disease)    OA (osteoarthritis)    Seasonal allergies

## 2020-03-02 ENCOUNTER — OUTPATIENT (OUTPATIENT)
Dept: OUTPATIENT SERVICES | Facility: HOSPITAL | Age: 60
LOS: 1 days | Discharge: HOME | End: 2020-03-02
Payer: MEDICAID

## 2020-03-02 ENCOUNTER — RESULT REVIEW (OUTPATIENT)
Age: 60
End: 2020-03-02

## 2020-03-02 VITALS
DIASTOLIC BLOOD PRESSURE: 72 MMHG | HEART RATE: 95 BPM | RESPIRATION RATE: 18 BRPM | HEIGHT: 66 IN | TEMPERATURE: 98 F | SYSTOLIC BLOOD PRESSURE: 116 MMHG | WEIGHT: 181.44 LBS | OXYGEN SATURATION: 95 %

## 2020-03-02 VITALS
DIASTOLIC BLOOD PRESSURE: 68 MMHG | HEART RATE: 85 BPM | RESPIRATION RATE: 12 BRPM | OXYGEN SATURATION: 97 % | SYSTOLIC BLOOD PRESSURE: 145 MMHG

## 2020-03-02 DIAGNOSIS — Z90.49 ACQUIRED ABSENCE OF OTHER SPECIFIED PARTS OF DIGESTIVE TRACT: Chronic | ICD-10-CM

## 2020-03-02 DIAGNOSIS — Z90.89 ACQUIRED ABSENCE OF OTHER ORGANS: Chronic | ICD-10-CM

## 2020-03-02 DIAGNOSIS — Z98.890 OTHER SPECIFIED POSTPROCEDURAL STATES: Chronic | ICD-10-CM

## 2020-03-02 PROCEDURE — 57520 CONIZATION OF CERVIX: CPT

## 2020-03-02 PROCEDURE — 88305 TISSUE EXAM BY PATHOLOGIST: CPT | Mod: 26

## 2020-03-02 PROCEDURE — 88307 TISSUE EXAM BY PATHOLOGIST: CPT | Mod: 26

## 2020-03-02 RX ORDER — CYCLOBENZAPRINE HYDROCHLORIDE 10 MG/1
1 TABLET, FILM COATED ORAL
Qty: 0 | Refills: 0 | DISCHARGE

## 2020-03-02 RX ORDER — HYDROMORPHONE HYDROCHLORIDE 2 MG/ML
0.5 INJECTION INTRAMUSCULAR; INTRAVENOUS; SUBCUTANEOUS
Refills: 0 | Status: DISCONTINUED | OUTPATIENT
Start: 2020-03-02 | End: 2020-03-02

## 2020-03-02 RX ORDER — ONDANSETRON 8 MG/1
4 TABLET, FILM COATED ORAL ONCE
Refills: 0 | Status: DISCONTINUED | OUTPATIENT
Start: 2020-03-02 | End: 2020-03-17

## 2020-03-02 RX ORDER — SODIUM CHLORIDE 9 MG/ML
1000 INJECTION, SOLUTION INTRAVENOUS
Refills: 0 | Status: DISCONTINUED | OUTPATIENT
Start: 2020-03-02 | End: 2020-03-17

## 2020-03-02 RX ORDER — IPRATROPIUM/ALBUTEROL SULFATE 18-103MCG
3 AEROSOL WITH ADAPTER (GRAM) INHALATION
Qty: 0 | Refills: 0 | DISCHARGE

## 2020-03-02 RX ORDER — ALBUTEROL 90 UG/1
2 AEROSOL, METERED ORAL
Qty: 0 | Refills: 0 | DISCHARGE

## 2020-03-02 RX ORDER — LETROZOLE 2.5 MG/1
1 TABLET, FILM COATED ORAL
Qty: 0 | Refills: 0 | DISCHARGE

## 2020-03-02 RX ORDER — MONTELUKAST 4 MG/1
1 TABLET, CHEWABLE ORAL
Qty: 0 | Refills: 0 | DISCHARGE

## 2020-03-02 RX ORDER — METHENAMINE MANDELATE 1 G
1 TABLET ORAL
Qty: 0 | Refills: 0 | DISCHARGE

## 2020-03-02 RX ORDER — DICLOFENAC SODIUM 30 MG/G
0 GEL TOPICAL
Qty: 0 | Refills: 0 | DISCHARGE

## 2020-03-02 RX ORDER — UMECLIDINIUM BROMIDE AND VILANTEROL TRIFENATATE 62.5; 25 UG/1; UG/1
1 POWDER RESPIRATORY (INHALATION)
Qty: 0 | Refills: 0 | DISCHARGE

## 2020-03-02 RX ORDER — OXYCODONE HYDROCHLORIDE 5 MG/1
5 TABLET ORAL ONCE
Refills: 0 | Status: DISCONTINUED | OUTPATIENT
Start: 2020-03-02 | End: 2020-03-02

## 2020-03-02 RX ADMIN — SODIUM CHLORIDE 100 MILLILITER(S): 9 INJECTION, SOLUTION INTRAVENOUS at 13:03

## 2020-03-02 RX ADMIN — OXYCODONE HYDROCHLORIDE 5 MILLIGRAM(S): 5 TABLET ORAL at 13:25

## 2020-03-02 NOTE — ASU DISCHARGE PLAN (ADULT/PEDIATRIC) - CARE PROVIDER_API CALL
Padilla Mitchell)  Gynecologic Oncology; Obstetrics and Gynecology  [address] [place] [address]  Phone: [phone]  Fax: [phone]  Follow Up Time: 2 weeks

## 2020-03-02 NOTE — BRIEF OPERATIVE NOTE - NSICDXBRIEFPROCEDURE_GEN_ALL_CORE_FT
PROCEDURES:  Biopsy, cold knife cone 02-Mar-2020 12:56:14  Val Cueto  Dilation and curettage, uterus 02-Mar-2020 12:56:05  Val Cueto

## 2020-03-02 NOTE — BRIEF OPERATIVE NOTE - NSICDXBRIEFPREOP_GEN_ALL_CORE_FT
PRE-OP DIAGNOSIS:  Cervical dysplasia 02-Mar-2020 12:56:30  Val Cueto  Postmenopausal bleeding 02-Mar-2020 12:56:23  Val Cueto

## 2020-03-02 NOTE — ASU DISCHARGE PLAN (ADULT/PEDIATRIC) - ACTIVITY LEVEL
2 weeks/Nothing per vagina/No douching/No intercourse/No tampons/No tub baths/No heavy lifting/Nothing per rectum

## 2020-03-02 NOTE — ASU DISCHARGE PLAN (ADULT/PEDIATRIC) - ASU DC SPECIAL INSTRUCTIONSFT
Please take Tylenol 650mg orally every 6 hours for mild pain.  Please take Ibuprofen 600mg orally every 6 hours for moderate pain.

## 2020-03-02 NOTE — BRIEF OPERATIVE NOTE - NSICDXBRIEFPOSTOP_GEN_ALL_CORE_FT
POST-OP DIAGNOSIS:  Cervical dysplasia 02-Mar-2020 12:56:35  Val Cueto  Postmenopausal bleeding 02-Mar-2020 12:56:41  Val Cueto

## 2020-03-02 NOTE — BRIEF OPERATIVE NOTE - OPERATION/FINDINGS
Firm irregular cervix, posteriorly > anteriorly. No parametrial or side wall involvement on EUA. No gross lesions/tumor.

## 2020-03-02 NOTE — CHART NOTE - NSCHARTNOTEFT_GEN_A_CORE
PACU ANESTHESIA ADMISSION NOTE      Procedure: Biopsy, cold knife cone  Dilation and curettage, uterus    Post op diagnosis:  Postmenopausal bleeding  Cervical dysplasia      ____  Intubated  TV:______       Rate: ______      FiO2: ______    __x__  Patent Airway    __x__  Full return of protective reflexes    __x__  Full recovery from anesthesia / back to baseline status    Vitals:  (C): 97.8  HR: 97  BP: 151/70  RR: 18   SpO2: 99%    Mental Status:  __x__ Awake   ___x__ Alert   _____ Drowsy   _____ Sedated    Nausea/Vomiting:  __x__ NO  ______Yes,   See Post - Op Orders          Pain Scale (0-10):  __0___    Treatment: ____ None    ____ See Post - Op/PCA Orders    Post - Operative Fluids:   __x__ Oral   ____ See Post - Op Orders    Plan: Discharge:   __x__Home       _____Floor     _____Critical Care    _____  Other:_________________    Comments: General with LMA converted to ETT after copious bile noted to be pouring from corner of mouth. Otherwise uneventful anesthesia course with no complications. VItals stable. Pt transferred to PACU. Please discharge to home once criteria are met.

## 2020-03-03 LAB — SURGICAL PATHOLOGY STUDY: SIGNIFICANT CHANGE UP

## 2020-03-10 DIAGNOSIS — Z85.3 PERSONAL HISTORY OF MALIGNANT NEOPLASM OF BREAST: ICD-10-CM

## 2020-03-10 DIAGNOSIS — M19.90 UNSPECIFIED OSTEOARTHRITIS, UNSPECIFIED SITE: ICD-10-CM

## 2020-03-10 DIAGNOSIS — J45.909 UNSPECIFIED ASTHMA, UNCOMPLICATED: ICD-10-CM

## 2020-03-10 DIAGNOSIS — N95.0 POSTMENOPAUSAL BLEEDING: ICD-10-CM

## 2020-03-10 DIAGNOSIS — F17.210 NICOTINE DEPENDENCE, CIGARETTES, UNCOMPLICATED: ICD-10-CM

## 2020-03-10 DIAGNOSIS — C53.9 MALIGNANT NEOPLASM OF CERVIX UTERI, UNSPECIFIED: ICD-10-CM

## 2020-03-10 DIAGNOSIS — Z88.2 ALLERGY STATUS TO SULFONAMIDES: ICD-10-CM

## 2020-03-10 DIAGNOSIS — J44.9 CHRONIC OBSTRUCTIVE PULMONARY DISEASE, UNSPECIFIED: ICD-10-CM

## 2020-04-09 PROBLEM — C50.919 MALIGNANT NEOPLASM OF UNSPECIFIED SITE OF UNSPECIFIED FEMALE BREAST: Chronic | Status: ACTIVE | Noted: 2020-02-24

## 2020-04-09 PROBLEM — M19.90 UNSPECIFIED OSTEOARTHRITIS, UNSPECIFIED SITE: Chronic | Status: ACTIVE | Noted: 2020-02-24

## 2020-04-09 PROBLEM — J30.2 OTHER SEASONAL ALLERGIC RHINITIS: Chronic | Status: ACTIVE | Noted: 2020-02-24

## 2020-04-13 ENCOUNTER — APPOINTMENT (OUTPATIENT)
Dept: HEMATOLOGY ONCOLOGY | Facility: CLINIC | Age: 60
End: 2020-04-13
Payer: MEDICAID

## 2020-04-13 ENCOUNTER — LABORATORY RESULT (OUTPATIENT)
Age: 60
End: 2020-04-13

## 2020-04-13 VITALS
HEART RATE: 109 BPM | BODY MASS INDEX: 28.12 KG/M2 | DIASTOLIC BLOOD PRESSURE: 66 MMHG | WEIGHT: 175 LBS | SYSTOLIC BLOOD PRESSURE: 112 MMHG | TEMPERATURE: 97.9 F | HEIGHT: 66 IN

## 2020-04-13 DIAGNOSIS — N63.20 UNSPECIFIED LUMP IN THE LEFT BREAST, UNSPECIFIED QUADRANT: ICD-10-CM

## 2020-04-13 LAB
ALBUMIN SERPL ELPH-MCNC: 4.2 G/DL
ALP BLD-CCNC: 96 U/L
ALT SERPL-CCNC: 18 U/L
ANION GAP SERPL CALC-SCNC: 12 MMOL/L
AST SERPL-CCNC: 18 U/L
BILIRUB SERPL-MCNC: <0.2 MG/DL
BUN SERPL-MCNC: 15 MG/DL
CALCIUM SERPL-MCNC: 9.6 MG/DL
CHLORIDE SERPL-SCNC: 103 MMOL/L
CO2 SERPL-SCNC: 27 MMOL/L
CREAT SERPL-MCNC: 0.7 MG/DL
GLUCOSE SERPL-MCNC: 91 MG/DL
HCT VFR BLD CALC: 41.7 %
HGB BLD-MCNC: 13.1 G/DL
MCHC RBC-ENTMCNC: 28.2 PG
MCHC RBC-ENTMCNC: 31.4 G/DL
MCV RBC AUTO: 89.9 FL
PLATELET # BLD AUTO: 379 K/UL
PMV BLD: 10.2 FL
POTASSIUM SERPL-SCNC: 4.4 MMOL/L
PROT SERPL-MCNC: 6.8 G/DL
RBC # BLD: 4.64 M/UL
RBC # FLD: 13.3 %
SODIUM SERPL-SCNC: 142 MMOL/L
WBC # FLD AUTO: 7.93 K/UL

## 2020-04-13 PROCEDURE — 99215 OFFICE O/P EST HI 40 MIN: CPT

## 2020-04-13 NOTE — CONSULT LETTER
[Dear  ___] : Dear  [unfilled], [Consult Letter:] : I had the pleasure of evaluating your patient, [unfilled]. [Please see my note below.] : Please see my note below. [Consult Closing:] : Thank you very much for allowing me to participate in the care of this patient.  If you have any questions, please do not hesitate to contact me. [Sincerely,] : Sincerely, [FreeTextEntry3] : Palmer Hall MD\par Professor [employer]\par Viv[employer]Di\par Attending Physician\par [facility] [place]\par [phone]\par \par

## 2020-04-13 NOTE — HISTORY OF PRESENT ILLNESS
[Disease: _____________________] : Disease: [unfilled] [T: ___] : T[unfilled] [N: ___] : N[unfilled] [M: ___] : M[unfilled] [AJCC Stage: ____] : AJCC Stage: [unfilled] [de-identified] : Patient is a 57 year old year-old postmenopausal female transferring her care to me. \par \par She has history of ER and CT positive 0.6 mm well, differentiated, microinvasive ductal carcinoma  ER/CT (+), HER2 (-). status post surgery currently on adjuvant Femara started in July 2015. \par She is s/p Right NLOC/SNB/RF Spect/XOFT 06/15/15 - 0/1 (-); DCIS intermed grade, no residual IDC; (-) margins\par Patient is taking calcium and vitamin D .\par She is refusing Pap smears.\par No FHx breast/ovarian cancer.\par  [de-identified] : IDC [de-identified] : ER and RI positive, and her-2 negative [de-identified] : < 1mm (microinvasive)\par UOQ [de-identified] : She has not been here since 11/18/16. However she states she has been compliant with Femara.\par She does not exercise.\par Her appetite is good.\par She denies any adverse events.\par \par 7/30/18:\par Doing well. Mammo in July 2018 was normal. DEXA in July 2018 showed osteopenia. \par C/o burning and pain while passing urine. Started few days ago, resolved by itself and recurred last night. Did not take any Abx for UTI. \par Compliant with Letrozole. Denies fever, nausea, vomiting, chest pain, SOB, abdominal pain, bowel problems.\par Colonoscopy done in 2017 and it was normal. \par \par 01/28/2019\par Patient is here for a follow up visit. \par C/O of weight gain and increased appetite. But otherwise tolerating letrozole well. \par Mammogram due in  july 2019. \par Denies fever, nausea, vomiting, chest pain, SOB, abdominal pain, bowel problems.\par Colonoscopy done in 2017 and it was normal. \par \par 7/8/19\par pt is here for follow up.\par Denies fever, nausea, vomiting, chest pain, SOB, abdominal pain, bowel problems.\par she had felt a lump under her left axilla, had mamamo/usg done neg as noted below.\par compliant with Femara.\par says she was diagnosed with iron deficiency last October??.\par states she was advised IV iron but has been taking po iron.\par Not taking Ca + D\par After extensive questioning pt finally admitted that she has been having vaginal spotting for several months.\par She has not seen a GYN in a long time\par \par 2/10/20\par  Patient here for follow up.  She denies any new complaints.  Patient denies any new palpable breast lumps or pain, denies skin changes, denies nipple discharge.  Patient denies cough, shortness of breath, denies fever, denies bone pain.\par compliant with letrozole.\par Still has vaginal spotting off and on.\par Did not go for USG of pelvis or GYN eval as advised previously.\par Pt states that this is not her first priority.\par Continues to smoke.\par No abdominal pain\par \par 4/13/2020: The patient is here for follow up . She remains on letrozole since July 2015. She denies fever, chills, no weight loss, no chest pain or shortness of breath . She is actively smoking. 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 She underwent conization with endometrial biopsy and vaginal exam on 3/2/2020 . 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 The patient is still reporting vaginal spotting intermittently\par

## 2020-04-13 NOTE — ASSESSMENT
[FreeTextEntry1] : 57 yr old postmenopausal female with microinvasive hormone receptor positive breast cancer on adjuvant Femara since July 2015. \par New diagnosis of early stage cervical cancer ( Stage IB1 or IB2), s/p conization on 3/2/2020\par \par PLAN\par -- For her DCIS: Continue Femara, Ca + Vit D - for a total of 5 yrs (end in June 2020) \par The side effects from such treatment were discussed in detail including but not limited to hot flashes, weight gain, hair thinning, arthralgia, myalgia, bone loss, increased risk of osteoporotic fractures and thrombo-embolism.\par She will take Ca + VIt D daily while on AI.\par Her compliance and any side effects from such treatment were assessed at today's visit\par Advised to stop smoking.\par DEXA scan to be repeated in July 2020.\par She needs left breast US to evaluate left breast hypodensity seen on previous mammo and US from June 2019. \par PT IS VERY NON COMPLIANT, asking to hold US breast for now , so she can deal with her new diagnosis of cervical cancer\par -- New diagnosis of stage IB poorly to moderately differentiated cervical squamous cell carcinoma, not candidate for surgery as per GYN/onc.  No PET scan for now as per patient request, which is not absolutely needed since we are dealing with early stage. \par We will offer chemoradiation with cisplatin . Common side effects reviewed with patient, incl myelosuppression, nausea, vomiting, diarrhea, nephrotoxicity, neurotoxicity, risk of infections.\par The chemotherapy dose was adjusted according to pt's height, weight, labs and anticipated tolerance.\par The high risk of complications and complexity associated with antineoplastic therapy administration has been explained to the pt and family.\par Chemotherapy will be administered under my direct supervision\par \par Referral given for Rad onc .\par We will obtain CBC,CMP today\par Case was D/W with GYN/ONC by Dr Hall. Pt is not a surgical candidate plus elective surgeries are on hold due to COVID-19 epidemic.\par We will coordinate care with Rad/Onc.\par The patient was seen and examined by Dr Hall who agreed with the above plan\par

## 2020-04-14 ENCOUNTER — APPOINTMENT (OUTPATIENT)
Dept: HEMATOLOGY ONCOLOGY | Facility: CLINIC | Age: 60
End: 2020-04-14

## 2020-04-16 ENCOUNTER — APPOINTMENT (OUTPATIENT)
Dept: RADIATION ONCOLOGY | Facility: HOSPITAL | Age: 60
End: 2020-04-16
Payer: MEDICAID

## 2020-04-16 DIAGNOSIS — Z80.42 FAMILY HISTORY OF MALIGNANT NEOPLASM OF PROSTATE: ICD-10-CM

## 2020-04-16 DIAGNOSIS — Z63.5 DISRUPTION OF FAMILY BY SEPARATION AND DIVORCE: ICD-10-CM

## 2020-04-16 DIAGNOSIS — Z92.3 PERSONAL HISTORY OF IRRADIATION: ICD-10-CM

## 2020-04-16 DIAGNOSIS — Z91.19 PATIENT'S NONCOMPLIANCE WITH OTHER MEDICAL TREATMENT AND REGIMEN: ICD-10-CM

## 2020-04-16 DIAGNOSIS — Z78.9 OTHER SPECIFIED HEALTH STATUS: ICD-10-CM

## 2020-04-16 DIAGNOSIS — Z92.21 PERSONAL HISTORY OF ANTINEOPLASTIC CHEMOTHERAPY: ICD-10-CM

## 2020-04-16 PROCEDURE — 99204 OFFICE O/P NEW MOD 45 MIN: CPT | Mod: 95

## 2020-04-16 RX ORDER — CYCLOBENZAPRINE HCL 10 MG
10 TABLET ORAL
Refills: 0 | Status: ACTIVE | COMMUNITY

## 2020-04-16 RX ORDER — CIPROFLOXACIN HYDROCHLORIDE 500 MG/1
500 TABLET, FILM COATED ORAL
Qty: 5 | Refills: 0 | Status: DISCONTINUED | COMMUNITY
Start: 2018-07-30 | End: 2020-04-16

## 2020-04-16 RX ORDER — NITROFURANTOIN MACROCRYSTALS 100 MG/1
100 CAPSULE ORAL
Qty: 14 | Refills: 0 | Status: DISCONTINUED | COMMUNITY
Start: 2020-02-28 | End: 2020-04-16

## 2020-04-16 SDOH — SOCIAL STABILITY - SOCIAL INSECURITY: DISRUPTION OF FAMILY BY SEPARATION AND DIVORCE: Z63.5

## 2020-04-16 NOTE — VITALS
[Maximal Pain Intensity: 0/10] : 0/10 [Least Pain Intensity: 0/10] : 0/10 [ECOG Performance Status: 2 - Ambulatory and capable of all self care but unable to carry out any work activities] : Performance Status: 2 - Ambulatory and capable of all self care but unable to carry out any work activities. Up and about more than 50% of waking hours

## 2020-04-16 NOTE — SOCIAL HISTORY
[Current Cigarette ___ Pack(s) per day] : current cigarette pack(s) per day:  [unfilled]  [Current Cigarettes ___ Pack Year(s)] : current pack year(s) of cigarette use: [unfilled]

## 2020-04-17 ENCOUNTER — APPOINTMENT (OUTPATIENT)
Dept: INFUSION THERAPY | Facility: CLINIC | Age: 60
End: 2020-04-17

## 2020-04-29 ENCOUNTER — LABORATORY RESULT (OUTPATIENT)
Age: 60
End: 2020-04-29

## 2020-04-29 ENCOUNTER — APPOINTMENT (OUTPATIENT)
Dept: HEMATOLOGY ONCOLOGY | Facility: CLINIC | Age: 60
End: 2020-04-29

## 2020-04-29 ENCOUNTER — OUTPATIENT (OUTPATIENT)
Dept: OUTPATIENT SERVICES | Facility: HOSPITAL | Age: 60
LOS: 1 days | Discharge: HOME | End: 2020-04-29
Payer: MEDICAID

## 2020-04-29 ENCOUNTER — RESULT REVIEW (OUTPATIENT)
Age: 60
End: 2020-04-29

## 2020-04-29 DIAGNOSIS — Z90.49 ACQUIRED ABSENCE OF OTHER SPECIFIED PARTS OF DIGESTIVE TRACT: Chronic | ICD-10-CM

## 2020-04-29 DIAGNOSIS — Z90.89 ACQUIRED ABSENCE OF OTHER ORGANS: Chronic | ICD-10-CM

## 2020-04-29 DIAGNOSIS — Z00.00 ENCOUNTER FOR GENERAL ADULT MEDICAL EXAMINATION W/OUT ABNORMAL FINDINGS: ICD-10-CM

## 2020-04-29 DIAGNOSIS — C53.9 MALIGNANT NEOPLASM OF CERVIX UTERI, UNSPECIFIED: ICD-10-CM

## 2020-04-29 DIAGNOSIS — Z98.890 OTHER SPECIFIED POSTPROCEDURAL STATES: Chronic | ICD-10-CM

## 2020-04-29 LAB
APPEARANCE: ABNORMAL
BILIRUBIN URINE: NEGATIVE
BLOOD URINE: ABNORMAL
COLOR: YELLOW
GLUCOSE QUALITATIVE U: NEGATIVE
KETONES URINE: NEGATIVE
LEUKOCYTE ESTERASE URINE: ABNORMAL
NITRITE URINE: NEGATIVE
PH URINE: 6
PROTEIN URINE: ABNORMAL
SPECIFIC GRAVITY URINE: 1.04
UROBILINOGEN URINE: ABNORMAL

## 2020-04-29 PROCEDURE — 72197 MRI PELVIS W/O & W/DYE: CPT | Mod: 26

## 2020-05-07 ENCOUNTER — APPOINTMENT (OUTPATIENT)
Dept: HEMATOLOGY ONCOLOGY | Facility: CLINIC | Age: 60
End: 2020-05-07
Payer: MEDICAID

## 2020-05-07 ENCOUNTER — OUTPATIENT (OUTPATIENT)
Dept: OUTPATIENT SERVICES | Facility: HOSPITAL | Age: 60
LOS: 1 days | Discharge: HOME | End: 2020-05-07

## 2020-05-07 VITALS
DIASTOLIC BLOOD PRESSURE: 84 MMHG | HEART RATE: 121 BPM | TEMPERATURE: 98 F | SYSTOLIC BLOOD PRESSURE: 132 MMHG | HEIGHT: 66 IN | BODY MASS INDEX: 27.8 KG/M2 | WEIGHT: 173 LBS

## 2020-05-07 DIAGNOSIS — T66.XXXA RADIATION SICKNESS, UNSPECIFIED, INITIAL ENCOUNTER: ICD-10-CM

## 2020-05-07 DIAGNOSIS — Z98.890 OTHER SPECIFIED POSTPROCEDURAL STATES: Chronic | ICD-10-CM

## 2020-05-07 DIAGNOSIS — C53.9 MALIGNANT NEOPLASM OF CERVIX UTERI, UNSPECIFIED: ICD-10-CM

## 2020-05-07 DIAGNOSIS — C50.411 MALIGNANT NEOPLASM OF UPPER-OUTER QUADRANT OF RIGHT FEMALE BREAST: ICD-10-CM

## 2020-05-07 DIAGNOSIS — Z90.89 ACQUIRED ABSENCE OF OTHER ORGANS: Chronic | ICD-10-CM

## 2020-05-07 DIAGNOSIS — N63.20 UNSPECIFIED LUMP IN THE LEFT BREAST, UNSPECIFIED QUADRANT: ICD-10-CM

## 2020-05-07 DIAGNOSIS — Z90.49 ACQUIRED ABSENCE OF OTHER SPECIFIED PARTS OF DIGESTIVE TRACT: Chronic | ICD-10-CM

## 2020-05-07 DIAGNOSIS — Z79.811 LONG TERM (CURRENT) USE OF AROMATASE INHIBITORS: ICD-10-CM

## 2020-05-07 DIAGNOSIS — D05.11 INTRADUCTAL CARCINOMA IN SITU OF RIGHT BREAST: ICD-10-CM

## 2020-05-07 PROCEDURE — 99215 OFFICE O/P EST HI 40 MIN: CPT

## 2020-05-07 PROCEDURE — 77263 THER RADIOLOGY TX PLNG CPLX: CPT

## 2020-05-07 NOTE — HISTORY OF PRESENT ILLNESS
[Home] : at home, [unfilled] , at the time of the visit. [Medical Office: ([facility])___] : at the medical office located in  [FreeTextEntry1] : Today's telehealth visit, Pt reports, intermit post menopausal bleeding for 3 -4  years, using 1 pad/day  Pt had GYN visit, requested by Dr. Hall which lead to her in having  D & C - revealing  Invasive squamous cell carcinoma, moderately to poorly differentiated. Her PMHx includes right lumpectomy, microinvasive hormone receptor breast cancer on adjuvant femara since July 2015.\par \par Pt reports, she will start chemo tomorrow 4/17/20, 1X week for 6 weeks with Dr. Hall.  Pt was informed no surgical intervention at this time.. As per pt, she will need radiation along with chemo. No recent PET scan - pt refusing further work-up, no other diagnostics except for surgical path.\par \par Pt reports, "feeling newby good except for being "tired" over the last 6 months. Pt is  from her spouse.  Planning to go live with sister and niece.  Pt reports of being independent with her ADLs.  current weight is 173 lbs / Height of 5.6\par Patient communication via telephone conducted to discuss Radiation therapy.

## 2020-05-07 NOTE — ADDENDUM
[FreeTextEntry1] : 6/7/2020  Patient returns for treatment planning.  discussed again the MRI findings.  \par \par Exam:  No cervical or supraclavicular nodes.  Lungs are clear.  Heart rhythm regular.  Abdomen soft.  No groin nodes.  Vaginal exam:  Erosion of the os, although no blanca exophytic tumor.  \par \par Impression:  Stage IIa cervical cancer.  \par \par Plan:  We proceeded with treatment planning CT.  Plan 45 Gy with chemo to the pelvis/cervix.  If adequate response may be candidate for a simple hysterectomy.   Otherwise will have to re-discuss the necessity for brachytherapy.

## 2020-05-07 NOTE — OB/GYN HISTORY
[Spotting Between  Menses] : spotting reported between menses [History of Hormone Replacement Therapy] : a history of hormone replacement therapy [Currently In Menopause] : currently in menopause [Menopause Age: ____] : patient was [unfilled] years old at menopause [___] : Living: [unfilled] [History of Birth Control Pills] : Patient has no history of taking birth control pills

## 2020-05-08 NOTE — PAST MEDICAL HISTORY
[Postmenopausal] : The patient is postmenopausal [Menarche Age ____] : age at menarche was [unfilled] [Menopause Age____] : age at menopause was [unfilled] [Total Preg ___] : G[unfilled] [Age At Live Birth ___] : Age at live birth: [unfilled] [Live Births ___] : P[unfilled]  [History of Hormone Replacement Treatment] : has no history of hormone replacement treatment [FreeTextEntry6] : none [FreeTextEntry5] : none [FreeTextEntry7] : none [FreeTextEntry8] : none

## 2020-05-08 NOTE — HISTORY OF PRESENT ILLNESS
[Disease: _____________________] : Disease: [unfilled] [T: ___] : T[unfilled] [M: ___] : M[unfilled] [AJCC Stage: ____] : AJCC Stage: [unfilled] [N: ___] : N[unfilled] [de-identified] : Patient is a 57 year old year-old postmenopausal female transferring her care to me. \par \par She has history of ER and KS positive 0.6 mm well, differentiated, microinvasive ductal carcinoma  ER/KS (+), HER2 (-). status post surgery currently on adjuvant Femara started in July 2015. \par She is s/p Right NLOC/SNB/RF Spect/XOFT 06/15/15 - 0/1 (-); DCIS intermed grade, no residual IDC; (-) margins\par Patient is taking calcium and vitamin D .\par She is refusing Pap smears.\par No FHx breast/ovarian cancer.\par  [de-identified] : IDC [de-identified] : ER and CO positive, and her-2 negative [de-identified] : < 1mm (microinvasive)\par UOQ [de-identified] : She has not been here since 11/18/16. However she states she has been compliant with Femara.\par She does not exercise.\par Her appetite is good.\par She denies any adverse events.\par \par 7/30/18:\par Doing well. Mammo in July 2018 was normal. DEXA in July 2018 showed osteopenia. \par C/o burning and pain while passing urine. Started few days ago, resolved by itself and recurred last night. Did not take any Abx for UTI. \par Compliant with Letrozole. Denies fever, nausea, vomiting, chest pain, SOB, abdominal pain, bowel problems.\par Colonoscopy done in 2017 and it was normal. \par \par 01/28/2019\par Patient is here for a follow up visit. \par C/O of weight gain and increased appetite. But otherwise tolerating letrozole well. \par Mammogram due in  july 2019. \par Denies fever, nausea, vomiting, chest pain, SOB, abdominal pain, bowel problems.\par Colonoscopy done in 2017 and it was normal. \par \par 7/8/19\par pt is here for follow up.\par Denies fever, nausea, vomiting, chest pain, SOB, abdominal pain, bowel problems.\par she had felt a lump under her left axilla, had mamamo/usg done neg as noted below.\par compliant with Femara.\par says she was diagnosed with iron deficiency last October??.\par states she was advised IV iron but has been taking po iron.\par Not taking Ca + D\par After extensive questioning pt finally admitted that she has been having vaginal spotting for several months.\par She has not seen a GYN in a long time\par \par 2/10/20\par  Patient here for follow up.  She denies any new complaints.  Patient denies any new palpable breast lumps or pain, denies skin changes, denies nipple discharge.  Patient denies cough, shortness of breath, denies fever, denies bone pain.\par compliant with letrozole.\par Still has vaginal spotting off and on.\par Did not go for USG of pelvis or GYN eval as advised previously.\par Pt states that this is not her first priority.\par Continues to smoke.\par No abdominal pain\par \par 4/13/2020: The patient is here for follow up . She remains on letrozole since July 2015. She denies fever, chills, no weight loss, no chest pain or shortness of breath . She is actively smoking. 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 She underwent conization with endometrial biopsy and vaginal exam on 3/2/2020 . 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 The patient is still reporting vaginal spotting intermittently\par \par \par 5/7/20\par Pt is here for follow up.\par She has completed MRI pelvis and was seen in Rad/Onc.\par No new symptoms

## 2020-05-08 NOTE — CONSULT LETTER
[Dear  ___] : Dear  [unfilled], [Consult Letter:] : I had the pleasure of evaluating your patient, [unfilled]. [Please see my note below.] : Please see my note below. [Consult Closing:] : Thank you very much for allowing me to participate in the care of this patient.  If you have any questions, please do not hesitate to contact me. [Sincerely,] : Sincerely, [FreeTextEntry3] : Palmer Hall MD\par Professor [employer]\par Viv[employer]Di\par Attending Physician\par [facility] [place]\par [phone]\par \par

## 2020-05-08 NOTE — ASSESSMENT
[FreeTextEntry1] : 57 yr old postmenopausal female with microinvasive hormone receptor positive breast cancer on adjuvant Femara since July 2015. \par New diagnosis of early stage cervical cancer ( Stage IIA1) s/p conization on 3/2/2020\par \par PLAN\par -- For her DCIS: Continue Femara, Ca + Vit D - for a total of 5 yrs (end in June 2020) \par The side effects from such treatment were discussed in detail including but not limited to hot flashes, weight gain, hair thinning, arthralgia, myalgia, bone loss, increased risk of osteoporotic fractures and thrombo-embolism.\par She will take Ca + VIt D daily while on AI.\par Her compliance and any side effects from such treatment were assessed at today's visit\par Advised to stop smoking.\par DEXA scan to be repeated in July 2020.\par She needs left breast US to evaluate left breast hypodensity seen on previous mammo and US from June 2019. \par PT IS VERY NON COMPLIANT, asking to hold US breast for now , so she can deal with her new diagnosis of cervical cancer\par \par \par -- New diagnosis of stage IIA1 poorly to moderately differentiated cervical squamous cell carcinoma, Per MRI results the cancer is more extensive than initially thought.\par The tumor is extending to upper vagina.\par I have advised the pt to proceed with PET scan.\par We discussed treatment including RT + systemic chemo with Cisplatin.\par \par Cisplatin will be administered at a dose of 40 mg/m2  on days 1, 8, 15, 22, 29, and 36 with RT\par Adequate precautions with antiemetics and IV hydration will will be undertaken.\par Pt was advised to maintain adequate oral hydration with fluids and electrolytes.\par Side effects incl but not limited to nausea, vomiting, myelosuppression, nephrotoxicity, risk of sepsis, electrolyte imbalances, neuropathy, hearing loss etc were discussed.\par \par The chemotherapy dose was adjusted according to pt's height, weight, labs and anticipated tolerance.\par The high risk of complications and complexity associated with antineoplastic therapy administration has been explained to the pt and family.\par Chemotherapy will be administered under my direct supervision\par \par We will coordinate care with Rad/Onc.\par

## 2020-05-14 ENCOUNTER — OUTPATIENT (OUTPATIENT)
Dept: OUTPATIENT SERVICES | Facility: HOSPITAL | Age: 60
LOS: 1 days | Discharge: HOME | End: 2020-05-14
Payer: MEDICAID

## 2020-05-14 ENCOUNTER — RESULT REVIEW (OUTPATIENT)
Age: 60
End: 2020-05-14

## 2020-05-14 DIAGNOSIS — C53.9 MALIGNANT NEOPLASM OF CERVIX UTERI, UNSPECIFIED: ICD-10-CM

## 2020-05-14 DIAGNOSIS — Z90.89 ACQUIRED ABSENCE OF OTHER ORGANS: Chronic | ICD-10-CM

## 2020-05-14 DIAGNOSIS — Z90.49 ACQUIRED ABSENCE OF OTHER SPECIFIED PARTS OF DIGESTIVE TRACT: Chronic | ICD-10-CM

## 2020-05-14 DIAGNOSIS — Z98.890 OTHER SPECIFIED POSTPROCEDURAL STATES: Chronic | ICD-10-CM

## 2020-05-14 LAB — GLUCOSE BLDC GLUCOMTR-MCNC: 103 MG/DL — HIGH (ref 70–99)

## 2020-05-14 PROCEDURE — 78815 PET IMAGE W/CT SKULL-THIGH: CPT | Mod: 26,PI

## 2020-05-22 PROCEDURE — 77334 RADIATION TREATMENT AID(S): CPT | Mod: 26

## 2020-05-22 PROCEDURE — 77295 3-D RADIOTHERAPY PLAN: CPT | Mod: 26

## 2020-05-22 PROCEDURE — 77300 RADIATION THERAPY DOSE PLAN: CPT | Mod: 26

## 2020-05-29 ENCOUNTER — OUTPATIENT (OUTPATIENT)
Dept: OUTPATIENT SERVICES | Facility: HOSPITAL | Age: 60
LOS: 1 days | Discharge: HOME | End: 2020-05-29

## 2020-05-29 ENCOUNTER — APPOINTMENT (OUTPATIENT)
Dept: HEMATOLOGY ONCOLOGY | Facility: CLINIC | Age: 60
End: 2020-05-29

## 2020-05-29 DIAGNOSIS — C53.9 MALIGNANT NEOPLASM OF CERVIX UTERI, UNSPECIFIED: ICD-10-CM

## 2020-05-29 DIAGNOSIS — Z90.89 ACQUIRED ABSENCE OF OTHER ORGANS: Chronic | ICD-10-CM

## 2020-05-29 DIAGNOSIS — Z90.49 ACQUIRED ABSENCE OF OTHER SPECIFIED PARTS OF DIGESTIVE TRACT: Chronic | ICD-10-CM

## 2020-05-29 DIAGNOSIS — C50.411 MALIGNANT NEOPLASM OF UPPER-OUTER QUADRANT OF RIGHT FEMALE BREAST: ICD-10-CM

## 2020-05-29 DIAGNOSIS — Z79.811 LONG TERM (CURRENT) USE OF AROMATASE INHIBITORS: ICD-10-CM

## 2020-05-29 DIAGNOSIS — Z98.890 OTHER SPECIFIED POSTPROCEDURAL STATES: Chronic | ICD-10-CM

## 2020-05-29 PROCEDURE — 77280 THER RAD SIMULAJ FIELD SMPL: CPT | Mod: 26

## 2020-05-30 LAB — SARS-COV-2 RNA SPEC QL NAA+PROBE: SIGNIFICANT CHANGE UP

## 2020-06-02 ENCOUNTER — APPOINTMENT (OUTPATIENT)
Dept: HEMATOLOGY ONCOLOGY | Facility: CLINIC | Age: 60
End: 2020-06-02
Payer: MEDICAID

## 2020-06-02 ENCOUNTER — LABORATORY RESULT (OUTPATIENT)
Age: 60
End: 2020-06-02

## 2020-06-02 ENCOUNTER — APPOINTMENT (OUTPATIENT)
Dept: INFUSION THERAPY | Facility: CLINIC | Age: 60
End: 2020-06-02
Payer: MEDICAID

## 2020-06-02 VITALS
SYSTOLIC BLOOD PRESSURE: 126 MMHG | DIASTOLIC BLOOD PRESSURE: 68 MMHG | TEMPERATURE: 97.2 F | WEIGHT: 178 LBS | HEART RATE: 111 BPM | HEIGHT: 66 IN | BODY MASS INDEX: 28.61 KG/M2

## 2020-06-02 VITALS
RESPIRATION RATE: 16 BRPM | HEART RATE: 99 BPM | SYSTOLIC BLOOD PRESSURE: 129 MMHG | BODY MASS INDEX: 28.81 KG/M2 | TEMPERATURE: 98.5 F | WEIGHT: 178.5 LBS | DIASTOLIC BLOOD PRESSURE: 81 MMHG

## 2020-06-02 LAB
ALBUMIN SERPL ELPH-MCNC: 4.2 G/DL
ALP BLD-CCNC: 110 U/L
ALT SERPL-CCNC: 21 U/L
ANION GAP SERPL CALC-SCNC: 12 MMOL/L
AST SERPL-CCNC: 33 U/L
BILIRUB SERPL-MCNC: 0.3 MG/DL
BUN SERPL-MCNC: 15 MG/DL
CALCIUM SERPL-MCNC: 9.8 MG/DL
CHLORIDE SERPL-SCNC: 101 MMOL/L
CO2 SERPL-SCNC: 27 MMOL/L
CREAT SERPL-MCNC: 0.7 MG/DL
GLUCOSE SERPL-MCNC: 108 MG/DL
HCT VFR BLD CALC: 44.7 %
HGB BLD-MCNC: 14.5 G/DL
MAGNESIUM SERPL-MCNC: 2.2 MG/DL
MCHC RBC-ENTMCNC: 28.2 PG
MCHC RBC-ENTMCNC: 32.4 G/DL
MCV RBC AUTO: 87 FL
PLATELET # BLD AUTO: 308 K/UL
PMV BLD: 11.1 FL
POTASSIUM SERPL-SCNC: 4.5 MMOL/L
POTASSIUM SERPL-SCNC: 6.1 MMOL/L
PROT SERPL-MCNC: 7.3 G/DL
RBC # BLD: 5.14 M/UL
RBC # FLD: 13.2 %
SODIUM SERPL-SCNC: 140 MMOL/L
WBC # FLD AUTO: 9.67 K/UL

## 2020-06-02 PROCEDURE — 77427 RADIATION TX MANAGEMENT X5: CPT

## 2020-06-02 PROCEDURE — 99215 OFFICE O/P EST HI 40 MIN: CPT

## 2020-06-02 RX ORDER — FOSAPREPITANT DIMEGLUMINE 150 MG/5ML
150 INJECTION, POWDER, LYOPHILIZED, FOR SOLUTION INTRAVENOUS ONCE
Refills: 0 | Status: COMPLETED | OUTPATIENT
Start: 2020-06-02 | End: 2020-06-02

## 2020-06-02 RX ORDER — CISPLATIN 1 MG/ML
76 INJECTION, SOLUTION INTRAVENOUS ONCE
Refills: 0 | Status: COMPLETED | OUTPATIENT
Start: 2020-06-02 | End: 2020-06-02

## 2020-06-02 RX ORDER — SODIUM CHLORIDE 9 MG/ML
1000 INJECTION INTRAMUSCULAR; INTRAVENOUS; SUBCUTANEOUS
Refills: 0 | Status: DISCONTINUED | OUTPATIENT
Start: 2020-06-02 | End: 2021-01-17

## 2020-06-02 RX ORDER — DEXAMETHASONE 0.5 MG/5ML
12 ELIXIR ORAL ONCE
Refills: 0 | Status: COMPLETED | OUTPATIENT
Start: 2020-06-02 | End: 2020-06-02

## 2020-06-02 RX ORDER — FUROSEMIDE 40 MG
40 TABLET ORAL ONCE
Refills: 0 | Status: COMPLETED | OUTPATIENT
Start: 2020-06-02 | End: 2020-06-02

## 2020-06-02 RX ORDER — SODIUM CHLORIDE 9 MG/ML
500 INJECTION INTRAMUSCULAR; INTRAVENOUS; SUBCUTANEOUS
Refills: 0 | Status: DISCONTINUED | OUTPATIENT
Start: 2020-06-02 | End: 2021-01-17

## 2020-06-02 RX ADMIN — SODIUM CHLORIDE 500 MILLILITER(S): 9 INJECTION INTRAMUSCULAR; INTRAVENOUS; SUBCUTANEOUS at 11:01

## 2020-06-02 RX ADMIN — Medication 40 MILLIGRAM(S): at 10:59

## 2020-06-02 RX ADMIN — Medication 122 MILLIGRAM(S): at 10:58

## 2020-06-02 RX ADMIN — CISPLATIN 434.67 MILLIGRAM(S): 1 INJECTION, SOLUTION INTRAVENOUS at 14:15

## 2020-06-02 RX ADMIN — SODIUM CHLORIDE 500 MILLILITER(S): 9 INJECTION INTRAMUSCULAR; INTRAVENOUS; SUBCUTANEOUS at 10:58

## 2020-06-02 RX ADMIN — FOSAPREPITANT DIMEGLUMINE 300 MILLIGRAM(S): 150 INJECTION, POWDER, LYOPHILIZED, FOR SOLUTION INTRAVENOUS at 10:58

## 2020-06-02 NOTE — HISTORY OF PRESENT ILLNESS
[Home] : at home, [unfilled] , at the time of the visit. [Medical Office: ([facility])___] : at the medical office located in  [FreeTextEntry1] : Today's telehealth visit, Pt reports, intermit post menopausal bleeding for 3 -4  years, using 1 pad/day  Pt had GYN visit, requested by Dr. Hall which lead to her in having  D & C - revealing  Invasive squamous cell carcinoma, moderately to poorly differentiated. Her PMHx includes right lumpectomy, microinvasive hormone receptor breast cancer on adjuvant femara since July 2015.\par \par Pt reports, she will start chemo tomorrow 4/17/20, 1X week for 6 weeks with Dr. Hall.  Pt was informed no surgical intervention at this time.. As per pt, she will need radiation along with chemo. No recent PET scan - pt refusing further work-up, no other diagnostics except for surgical path.\par \par Pt reports, "feeling newby good except for being "tired" over the last 6 months. Pt is  from her spouse.  Planning to go live with sister and niece.  Pt reports of being independent with her ADLs.  current weight is 173 lbs / Height of 5.6\par Patient communication via telephone conducted to discuss Radiation therapy.

## 2020-06-02 NOTE — DISEASE MANAGEMENT
[Clinical] : TNM Stage: c [IB] : IB [TTNM] : 1b [NTNM] : 0 [MTNM] : 0 [de-identified] : 180cGy [de-identified] : 1560cGy [de-identified] : Cervix

## 2020-06-02 NOTE — VITALS
[Opioid] : opioid [90: Able to carry normal activity; minor signs or symptoms of disease.] : 90: Able to carry normal activity; minor signs or symptoms of disease.

## 2020-06-05 NOTE — CONSULT LETTER
[Dear  ___] : Dear  [unfilled], [Consult Letter:] : I had the pleasure of evaluating your patient, [unfilled]. [Please see my note below.] : Please see my note below. [Consult Closing:] : Thank you very much for allowing me to participate in the care of this patient.  If you have any questions, please do not hesitate to contact me. [Sincerely,] : Sincerely, [FreeTextEntry3] : Palmer Hall MD\par Professor [employer]\par Viv[employer]Di\par Attending Physician\par [facility] [place]\par [phone]\par \par

## 2020-06-05 NOTE — ASSESSMENT
[FreeTextEntry1] : 57 yr old postmenopausal female with microinvasive hormone receptor positive breast cancer on adjuvant Femara since Sept 2015. \par New diagnosis of early stage cervical cancer ( Stage IIA1) s/p conization on 3/2/2020\par \par PLAN\par -- For her DCIS: Continue Femara, Ca + Vit D - for a total of 5 yrs (September 2020) \par The side effects from such treatment were discussed in detail including but not limited to hot flashes, weight gain, hair thinning, arthralgia, myalgia, bone loss, increased risk of osteoporotic fractures and thrombo-embolism.\par She will take Ca + VIt D daily while on AI.\par Her compliance and any side effects from such treatment were assessed at today's visit\par Advised to stop smoking.\par DEXA scan to be repeated in July 2020.\par She needs left breast US to evaluate left breast hypodensity seen on previous mammo and US from June 2019. \par PT IS VERY NON COMPLIANT, asking to hold US breast for now , so she can deal with her new diagnosis of cervical cancer.\par \par -- New diagnosis of stage IIA1 poorly to moderately differentiated cervical squamous cell carcinoma, Per MRI results the cancer is more extensive than initially thought. Last PET done 5/2020\par Cisplatin will be administered at a dose of 40 mg/m2 on days 1, 8, 15, 22, 29, and 36 with RT\par Adequate precautions with antiemetics and IV hydration will will be undertaken.\par Pt was advised to maintain adequate oral hydration with fluids and electrolytes.\par Side effects incl but not limited to nausea, vomiting, myelosuppression, nephrotoxicity, risk of sepsis, electrolyte imbalances, neuropathy, hearing loss etc were discussed.\par \par The chemotherapy dose was adjusted according to pt's height, weight, labs and anticipated tolerance.\par The high risk of complications and complexity associated with antineoplastic therapy administration has been explained to the pt and family.\par Chemotherapy will be administered under Dr Hall's direct supervision\par \par We will coordinate care with Rad/Onc. \par \par RTC in 1 week\par \par Case was seen and discussed with Dr. Hall who agreed with the assessment and plan.\par

## 2020-06-05 NOTE — HISTORY OF PRESENT ILLNESS
[Disease: _____________________] : Disease: [unfilled] [N: ___] : N[unfilled] [T: ___] : T[unfilled] [M: ___] : M[unfilled] [AJCC Stage: ____] : AJCC Stage: [unfilled] [de-identified] : Patient is a 57 year old year-old postmenopausal female transferring her care to me. \par \par She has history of ER and FL positive 0.6 mm well, differentiated, microinvasive ductal carcinoma  ER/FL (+), HER2 (-). status post surgery currently on adjuvant Femara started in July 2015. \par She is s/p Right NLOC/SNB/RF Spect/XOFT 06/15/15 - 0/1 (-); DCIS intermed grade, no residual IDC; (-) margins\par Patient is taking calcium and vitamin D .\par She is refusing Pap smears.\par No FHx breast/ovarian cancer.\par  [de-identified] : IDC [de-identified] : ER and [place] positive, and her-2 negative [de-identified] : < 1mm (microinvasive)\par UOQ [de-identified] : She has not been here since 11/18/16. However she states she has been compliant with Femara.\par She does not exercise.\par Her appetite is good.\par She denies any adverse events.\par \par 7/30/18:\par Doing well. Mammo in July 2018 was normal. DEXA in July 2018 showed osteopenia. \par C/o burning and pain while passing urine. Started few days ago, resolved by itself and recurred last night. Did not take any Abx for UTI. \par Compliant with Letrozole. Denies fever, nausea, vomiting, chest pain, SOB, abdominal pain, bowel problems.\par Colonoscopy done in 2017 and it was normal. \par \par 01/28/2019\par Patient is here for a follow up visit. \par C/O of weight gain and increased appetite. But otherwise tolerating letrozole well. \par Mammogram due in  july 2019. \par Denies fever, nausea, vomiting, chest pain, SOB, abdominal pain, bowel problems.\par Colonoscopy done in 2017 and it was normal. \par \par 7/8/19\par pt is here for follow up.\par Denies fever, nausea, vomiting, chest pain, SOB, abdominal pain, bowel problems.\par she had felt a lump under her left axilla, had mamamo/usg done neg as noted below.\par compliant with Femara.\par says she was diagnosed with iron deficiency last October??.\par states she was advised IV iron but has been taking po iron.\par Not taking Ca + D\par After extensive questioning pt finally admitted that she has been having vaginal spotting for several months.\par She has not seen a GYN in a long time\par \par 2/10/20\par  Patient here for follow up.  She denies any new complaints.  Patient denies any new palpable breast lumps or pain, denies skin changes, denies nipple discharge.  Patient denies cough, shortness of breath, denies fever, denies bone pain.\par compliant with letrozole.\par Still has vaginal spotting off and on.\par Did not go for USG of pelvis or GYN eval as advised previously.\par Pt states that this is not her first priority.\par Continues to smoke.\par No abdominal pain\par \par 4/13/2020: The patient is here for follow up . She remains on letrozole since July 2015. She denies fever, chills, no weight loss, no chest pain or shortness of breath . She is actively smoking. 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 She underwent conization with endometrial biopsy and vaginal exam on 3/2/2020 . 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 The patient is still reporting vaginal spotting intermittently\par \par \par 5/7/20\par Pt is here for follow up.\par She has completed MRI pelvis and was seen in Rad/Onc.\par No new symptoms\par \par 06/02/2020\par JUAN ANTONIO OSHEA a 59 year F is here today for follow up of Breast Ca and cervical cancer.\par Has been complaint with femara since 9/2015 vitamin and calcium.  Pt does not want to continue Femara while on Chemo and RT. \par Patient denies any new palpable breast lumps or pain, denies skin changes, denies nipple discharge. \par Denies vaginal bleeding, bloating, abdominal pain. Denies dysuria, fever, chills. \par She started RT today and will start weekly cisplatin today.  \par She had PET on 5/14: Focal FDG activity, proximal left lower lobe associated with approximate 8 mm branch point nodule (axial image 195, max SUV 4.2). Follow-up diagnostic CT scan 3 months time recommended. New FDG avid 8 mm left pelvic sidewall lymph node (axial 103, max SUV 9.8), suspicious for biologic tumor activity. \par CBC reviewed.

## 2020-06-05 NOTE — RESULTS/DATA
[FreeTextEntry1] : EXAM: PETCT SKUL-THI ONC FDG INIT \par \par \par PROCEDURE DATE: 05/14/2020 \par \par \par INTERPRETATION: Reason for study: Cervical cancer. \par FDG PET CT STUDY initial treatment strategy \par REASON: TUMOR IMAGING - PET with concurrently acquired CT for attenuation \par correction and anatomic localization; skull base to mid - thigh . \par \par CPT code 17169. \par \par Fasting blood glucose level: 103 mg/dl \par \par HISTORY: 59-year-old with recent diagnosis of cervical cancer. Previous \par history of right breast cancer (2015). Patient reports no prior history of \par chemotherapy. Patient reports Radiation therapy 2017. \par \par TECHNIQUE: Approximately 45 minutes after the intravenous administration of \par 18.9 mCi 18-Fluorine FDG, whole body PET images were acquired from base of \par skull to mid - thigh. \par \par CT protocol used for this PET/CT study is designed for attenuation \par correction and anatomic localization of PET findings. This  CT is \par not designed to replace state-of-the-art diagnostic CT scans for specific \par imaging protocols of different body parts. \par \par \par COMPARISON : PET scan-May 19 2015 with the following \par impression: \par 1.1.4-cm by 1.1cm precarinal lymph node, PET negative. \par 2. Right hilar lymph node measuring 2.4-cm by 1cm FDG avid max SUV 2.7, and \par minimal increased uptake seen in the left hilar region max SUV 2.6 without \par any CT correlate, findings most likely postinflammatory in nature. \par \par 3. 1.5-cm right axillary lymph node Max SUV 1.6 considered to be PET \par negative. \par \par 4. No abnormal increased uptake is seen in the right breast at the site of \par clip, or right costophrenic angle . \par \par 5. No abnormal increased uptake is seen to indicate biological tumor \par activity. \par \par FINDINGS: \par \par Head/Neck: No biologically active head/neck lesions. \par Normal uptake within the brain, pharyngeal lymphoid tissue, salivary glands \par and laryngeal musculature is noted. \par \par Thorax: Bilateral perihilar bronchial thickening with symmetric FDG activity. \par \par Focal FDG activity, proximal left lower lobe associated with approximate 8 \par mm branch point nodule (axial image 195, max SUV 4.2). \par \par Linear activity distal esophagus likely related to nonspecific esophagitis. \par \par Abdomen/Pelvis: Physiologic GI/  activity. Interval development of \par approximate 3.8 cm FDG avid cervical mass with Max SUV 21.3 consistent with \par patient's known cervical cancer. \par \par New FDG avid 8 mm left pelvic sidewall lymph node (axial 13, max SUV 9.8), \par suspicious for biologic tumor activity. \par \par Musculoskeletal : No suspicious hypermetabolic osseous lesions. \par \par Additional CT findings: \par Complete opacification right maxillary sinus. \par 4.7 cm hiatal hernia, previously 2.7 cm \par Postcholecystectomy \par \par \par \par IMPRESSION: \par \par Since: May 2015 \par \par Interval development of approximate 3.8 cm FDG avid cervical mass with Max \par SUV 21.3, consistent with patient's known cervical cancer. \par \par New FDG avid 8 mm left pelvic sidewall lymph node (axial 103, max SUV 9.8), \par suspicious for biologic tumor activity. \par \par Bilateral perihilar bronchial thickening with symmetric FDG activity \par (similar to earlier study). \par \par Focal FDG activity, proximal left lower lobe associated with approximate 8 \par mm branch point nodule (axial image 195, max SUV 4.2). Follow-up diagnostic \par CT scan 3 months time recommended \par \par Linear activity distal esophagus, most consistent with nonspecific \par esophagitis \par \par

## 2020-06-08 ENCOUNTER — APPOINTMENT (OUTPATIENT)
Dept: HEMATOLOGY ONCOLOGY | Facility: CLINIC | Age: 60
End: 2020-06-08
Payer: MEDICAID

## 2020-06-08 ENCOUNTER — LABORATORY RESULT (OUTPATIENT)
Age: 60
End: 2020-06-08

## 2020-06-08 VITALS
WEIGHT: 179 LBS | SYSTOLIC BLOOD PRESSURE: 135 MMHG | HEIGHT: 66 IN | HEART RATE: 115 BPM | DIASTOLIC BLOOD PRESSURE: 79 MMHG | TEMPERATURE: 98 F | BODY MASS INDEX: 28.77 KG/M2

## 2020-06-08 LAB
ALBUMIN SERPL ELPH-MCNC: 4.4 G/DL
ALP BLD-CCNC: 101 U/L
ALT SERPL-CCNC: 27 U/L
ANION GAP SERPL CALC-SCNC: 11 MMOL/L
AST SERPL-CCNC: 22 U/L
BILIRUB SERPL-MCNC: 0.3 MG/DL
BUN SERPL-MCNC: 14 MG/DL
CALCIUM SERPL-MCNC: 9.8 MG/DL
CHLORIDE SERPL-SCNC: 98 MMOL/L
CO2 SERPL-SCNC: 28 MMOL/L
CREAT SERPL-MCNC: 0.8 MG/DL
GLUCOSE SERPL-MCNC: 88 MG/DL
HCT VFR BLD CALC: 41.3 %
HGB BLD-MCNC: 13 G/DL
MAGNESIUM SERPL-MCNC: 1.9 MG/DL
MCHC RBC-ENTMCNC: 27.6 PG
MCHC RBC-ENTMCNC: 31.5 G/DL
MCV RBC AUTO: 87.7 FL
PLATELET # BLD AUTO: 355 K/UL
PMV BLD: 9.6 FL
POTASSIUM SERPL-SCNC: 4.6 MMOL/L
PROT SERPL-MCNC: 6.9 G/DL
RBC # BLD: 4.71 M/UL
RBC # FLD: 13.2 %
SODIUM SERPL-SCNC: 137 MMOL/L
WBC # FLD AUTO: 5.91 K/UL

## 2020-06-08 PROCEDURE — 99215 OFFICE O/P EST HI 40 MIN: CPT

## 2020-06-08 NOTE — PAST MEDICAL HISTORY
[Postmenopausal] : The patient is postmenopausal [Menarche Age ____] : age at menarche was [unfilled] [Menopause Age____] : age at menopause was [unfilled] [Total Preg ___] : G[unfilled] [Live Births ___] : P[unfilled]  [Age At Live Birth ___] : Age at live birth: [unfilled] [FreeTextEntry6] : none [History of Hormone Replacement Treatment] : has no history of hormone replacement treatment [FreeTextEntry5] : none [FreeTextEntry7] : none [FreeTextEntry8] : none

## 2020-06-08 NOTE — PHYSICAL EXAM
[Fully active, able to carry on all pre-disease performance without restriction] : Status 0 - Fully active, able to carry on all pre-disease performance without restriction [Normal] : no peripheral adenopathy appreciated [de-identified] : pt refused breast exam

## 2020-06-08 NOTE — PHYSICAL EXAM
[Fully active, able to carry on all pre-disease performance without restriction] : Status 0 - Fully active, able to carry on all pre-disease performance without restriction [Normal] : no peripheral adenopathy appreciated [de-identified] : pt refused breast exam

## 2020-06-08 NOTE — CONSULT LETTER
[Dear  ___] : Dear  [unfilled], [Consult Letter:] : I had the pleasure of evaluating your patient, [unfilled]. [Please see my note below.] : Please see my note below. [Sincerely,] : Sincerely, [Consult Closing:] : Thank you very much for allowing me to participate in the care of this patient.  If you have any questions, please do not hesitate to contact me. [FreeTextEntry3] : Palmer Hall MD\par Professor [employer]\par Viv[employer]Di\par Attending Physician\par [facility] [place]\par [phone]\par \par

## 2020-06-08 NOTE — CONSULT LETTER
[Dear  ___] : Dear  [unfilled], [Consult Letter:] : I had the pleasure of evaluating your patient, [unfilled]. [Please see my note below.] : Please see my note below. [Consult Closing:] : Thank you very much for allowing me to participate in the care of this patient.  If you have any questions, please do not hesitate to contact me. [Sincerely,] : Sincerely, [FreeTextEntry3] : Palmer Hall MD\par Professor [employer]\par Viv[employer]Di\par Attending Physician\par [facility] [place]\par [phone]\par \par

## 2020-06-08 NOTE — ASSESSMENT
[FreeTextEntry1] : 57 yr old postmenopausal female with microinvasive hormone receptor positive breast cancer on adjuvant Femara since Sept 2015. \par New diagnosis of early stage cervical cancer ( Stage IIA1) s/p conization on 3/2/2020\par \par PLAN\par -- For her DCIS: Continue Femara, Ca + Vit D - for a total of 5 yrs (September 2020) \par The side effects from such treatment were discussed in detail including but not limited to hot flashes, weight gain, hair thinning, arthralgia, myalgia, bone loss, increased risk of osteoporotic fractures and thrombo-embolism.\par She will take Ca + VIt D daily while on AI.\par Her compliance and any side effects from such treatment were assessed at today's visit\par Advised to stop smoking.\par DEXA scan to be repeated in July 2020.\par She needs left breast US to evaluate left breast hypodensity seen on previous mammo and US from June 2019. \par PT IS VERY NON COMPLIANT, asking to hold US breast for now , so she can deal with her new diagnosis of cervical cancer.\par \par -- New diagnosis of stage IIA1 poorly to moderately differentiated cervical squamous cell carcinoma, Per MRI results the cancer is more extensive than initially thought. Last PET done 5/2020\par Pt is s/p 1 dose of Cisplatin which she tolerated well.\par Cisplatin will be administered at a dose of 40 mg/m2 on days 1, 8, 15, 22, 29, and 36 with RT.\par Proceed with day 8 of Cisplatin, counts are adequate\par Adequate precautions with antiemetics and IV hydration will will be undertaken.\par Pt was advised to maintain adequate oral hydration with fluids and electrolytes.\par Side effects incl but not limited to nausea, vomiting, myelosuppression, nephrotoxicity, risk of sepsis, electrolyte imbalances, neuropathy, hearing loss etc were discussed.\par \par The chemotherapy dose was adjusted according to pt's height, weight, labs and anticipated tolerance.\par The high risk of complications and complexity associated with antineoplastic therapy administration has been explained to the pt and family.\par Chemotherapy will be administered under my direct supervision\par \par Will add Prilosec to her regimen.\par Pt advised to hold off on caffeine\par \par We will coordinate care with Rad/Onc. \par \par RTC in 1 week\par \par

## 2020-06-08 NOTE — HISTORY OF PRESENT ILLNESS
[Disease: _____________________] : Disease: [unfilled] [T: ___] : T[unfilled] [N: ___] : N[unfilled] [M: ___] : M[unfilled] [AJCC Stage: ____] : AJCC Stage: [unfilled] [de-identified] : Patient is a 57 year old year-old postmenopausal female transferring her care to me. \par \par She has history of ER and FL positive 0.6 mm well, differentiated, microinvasive ductal carcinoma  ER/FL (+), HER2 (-). status post surgery currently on adjuvant Femara started in July 2015. \par She is s/p Right NLOC/SNB/RF Spect/XOFT 06/15/15 - 0/1 (-); DCIS intermed grade, no residual IDC; (-) margins\par Patient is taking calcium and vitamin D .\par She is refusing Pap smears.\par No FHx breast/ovarian cancer.\par  [de-identified] : IDC [de-identified] : ER and [place] positive, and her-2 negative [de-identified] : She has not been here since 11/18/16. However she states she has been compliant with Femara.\par She does not exercise.\par Her appetite is good.\par She denies any adverse events.\par \par 7/30/18:\par Doing well. Mammo in July 2018 was normal. DEXA in July 2018 showed osteopenia. \par C/o burning and pain while passing urine. Started few days ago, resolved by itself and recurred last night. Did not take any Abx for UTI. \par Compliant with Letrozole. Denies fever, nausea, vomiting, chest pain, SOB, abdominal pain, bowel problems.\par Colonoscopy done in 2017 and it was normal. \par \par 01/28/2019\par Patient is here for a follow up visit. \par C/O of weight gain and increased appetite. But otherwise tolerating letrozole well. \par Mammogram due in  july 2019. \par Denies fever, nausea, vomiting, chest pain, SOB, abdominal pain, bowel problems.\par Colonoscopy done in 2017 and it was normal. \par \par 7/8/19\par pt is here for follow up.\par Denies fever, nausea, vomiting, chest pain, SOB, abdominal pain, bowel problems.\par she had felt a lump under her left axilla, had mamamo/usg done neg as noted below.\par compliant with Femara.\par says she was diagnosed with iron deficiency last October??.\par states she was advised IV iron but has been taking po iron.\par Not taking Ca + D\par After extensive questioning pt finally admitted that she has been having vaginal spotting for several months.\par She has not seen a GYN in a long time\par \par 2/10/20\par  Patient here for follow up.  She denies any new complaints.  Patient denies any new palpable breast lumps or pain, denies skin changes, denies nipple discharge.  Patient denies cough, shortness of breath, denies fever, denies bone pain.\par compliant with letrozole.\par Still has vaginal spotting off and on.\par Did not go for USG of pelvis or GYN eval as advised previously.\par Pt states that this is not her first priority.\par Continues to smoke.\par No abdominal pain\par \par 4/13/2020: The patient is here for follow up . She remains on letrozole since July 2015. She denies fever, chills, no weight loss, no chest pain or shortness of breath . She is actively smoking. 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 She underwent conization with endometrial biopsy and vaginal exam on 3/2/2020 . 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 The patient is still reporting vaginal spotting intermittently\par \par \par 5/7/20\par Pt is here for follow up.\par She has completed MRI pelvis and was seen in Rad/Onc.\par No new symptoms\par \par 06/02/2020\par JUAN ANTONIO OSHEA a 59 year F is here today for follow up of Breast Ca and cervical cancer.\par Has been complaint with femara since 9/2015 vitamin and calcium.  Pt does not want to continue Femara while on Chemo and RT. \par Patient denies any new palpable breast lumps or pain, denies skin changes, denies nipple discharge. \par Denies vaginal bleeding, bloating, abdominal pain. Denies dysuria, fever, chills. \par She started RT today and will start weekly cisplatin today.  \par She had PET on 5/14: Focal FDG activity, proximal left lower lobe associated with approximate 8 mm branch point nodule (axial image 195, max SUV 4.2). Follow-up diagnostic CT scan 3 months time recommended. New FDG avid 8 mm left pelvic sidewall lymph node (axial 103, max SUV 9.8), suspicious for biologic tumor activity. \par CBC reviewed. \par \par 6/8/20\par Pt is here for follow up.\par She is s/p 1 weekly dose of Cisplatin which she tolerated well.\par Her only complaint is of heart burn since last week.\par In addition she has been experiencing insomnia.\par No N, V, D.\par Had mild vaginal bleeding.\par No abd pain. [de-identified] : < 1mm (microinvasive)\par UOQ

## 2020-06-08 NOTE — PAST MEDICAL HISTORY
[Menarche Age ____] : age at menarche was [unfilled] [Postmenopausal] : The patient is postmenopausal [Menopause Age____] : age at menopause was [unfilled] [Live Births ___] : P[unfilled]  [Total Preg ___] : G[unfilled] [Age At Live Birth ___] : Age at live birth: [unfilled] [FreeTextEntry6] : none [FreeTextEntry5] : none [History of Hormone Replacement Treatment] : has no history of hormone replacement treatment [FreeTextEntry7] : none [FreeTextEntry8] : none

## 2020-06-08 NOTE — HISTORY OF PRESENT ILLNESS
[T: ___] : T[unfilled] [Disease: _____________________] : Disease: [unfilled] [N: ___] : N[unfilled] [M: ___] : M[unfilled] [AJCC Stage: ____] : AJCC Stage: [unfilled] [de-identified] : Patient is a 57 year old year-old postmenopausal female transferring her care to me. \par \par She has history of ER and [place] positive 0.6 mm well, differentiated, microinvasive ductal carcinoma  ER/[place] (+), HER2 (-). status post surgery currently on adjuvant Femara started in July 2015. \par She is s/p Right NLOC/SNB/RF Spect/XOFT 06/15/15 - 0/1 (-); DCIS intermed grade, no residual IDC; (-) margins\par Patient is taking calcium and vitamin D .\par She is refusing Pap smears.\par No FHx breast/ovarian cancer.\par  [de-identified] : IDC [de-identified] : ER and [place] positive, and her-2 negative [de-identified] : She has not been here since 11/18/16. However she states she has been compliant with Femara.\par She does not exercise.\par Her appetite is good.\par She denies any adverse events.\par \par 7/30/18:\par Doing well. Mammo in July 2018 was normal. DEXA in July 2018 showed osteopenia. \par C/o burning and pain while passing urine. Started few days ago, resolved by itself and recurred last night. Did not take any Abx for UTI. \par Compliant with Letrozole. Denies fever, nausea, vomiting, chest pain, SOB, abdominal pain, bowel problems.\par Colonoscopy done in 2017 and it was normal. \par \par 01/28/2019\par Patient is here for a follow up visit. \par C/O of weight gain and increased appetite. But otherwise tolerating letrozole well. \par Mammogram due in  july 2019. \par Denies fever, nausea, vomiting, chest pain, SOB, abdominal pain, bowel problems.\par Colonoscopy done in 2017 and it was normal. \par \par 7/8/19\par pt is here for follow up.\par Denies fever, nausea, vomiting, chest pain, SOB, abdominal pain, bowel problems.\par she had felt a lump under her left axilla, had mamamo/usg done neg as noted below.\par compliant with Femara.\par says she was diagnosed with iron deficiency last October??.\par states she was advised IV iron but has been taking po iron.\par Not taking Ca + D\par After extensive questioning pt finally admitted that she has been having vaginal spotting for several months.\par She has not seen a GYN in a long time\par \par 2/10/20\par  Patient here for follow up.  She denies any new complaints.  Patient denies any new palpable breast lumps or pain, denies skin changes, denies nipple discharge.  Patient denies cough, shortness of breath, denies fever, denies bone pain.\par compliant with letrozole.\par Still has vaginal spotting off and on.\par Did not go for USG of pelvis or GYN eval as advised previously.\par Pt states that this is not her first priority.\par Continues to smoke.\par No abdominal pain\par \par 4/13/2020: The patient is here for follow up . She remains on letrozole since July 2015. She denies fever, chills, no weight loss, no chest pain or shortness of breath . She is actively smoking. 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 She underwent conization with endometrial biopsy and vaginal exam on 3/2/2020 . 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 The patient is still reporting vaginal spotting intermittently\par \par \par 5/7/20\par Pt is here for follow up.\par She has completed MRI pelvis and was seen in Rad/Onc.\par No new symptoms\par \par 06/02/2020\par JUAN ANTONIO OSHEA a 59 year F is here today for follow up of Breast Ca and cervical cancer.\par Has been complaint with femara since 9/2015 vitamin and calcium.  Pt does not want to continue Femara while on Chemo and RT. \par Patient denies any new palpable breast lumps or pain, denies skin changes, denies nipple discharge. \par Denies vaginal bleeding, bloating, abdominal pain. Denies dysuria, fever, chills. \par She started RT today and will start weekly cisplatin today.  \par She had PET on 5/14: Focal FDG activity, proximal left lower lobe associated with approximate 8 mm branch point nodule (axial image 195, max SUV 4.2). Follow-up diagnostic CT scan 3 months time recommended. New FDG avid 8 mm left pelvic sidewall lymph node (axial 103, max SUV 9.8), suspicious for biologic tumor activity. \par CBC reviewed. \par \par 6/8/20\par Pt is here for follow up.\par She is s/p 1 weekly dose of Cisplatin which she tolerated well.\par Her only complaint is of heart burn since last week.\par In addition she has been experiencing insomnia.\par No N, V, D.\par Had mild vaginal bleeding.\par No abd pain. [de-identified] : < 1mm (microinvasive)\par UOQ

## 2020-06-09 ENCOUNTER — APPOINTMENT (OUTPATIENT)
Dept: INFUSION THERAPY | Facility: CLINIC | Age: 60
End: 2020-06-09

## 2020-06-09 VITALS
SYSTOLIC BLOOD PRESSURE: 122 MMHG | HEART RATE: 88 BPM | WEIGHT: 178.38 LBS | RESPIRATION RATE: 16 BRPM | BODY MASS INDEX: 28.79 KG/M2 | DIASTOLIC BLOOD PRESSURE: 73 MMHG | TEMPERATURE: 98.5 F

## 2020-06-09 PROCEDURE — 77427 RADIATION TX MANAGEMENT X5: CPT

## 2020-06-09 RX ORDER — DEXAMETHASONE 0.5 MG/5ML
12 ELIXIR ORAL ONCE
Refills: 0 | Status: COMPLETED | OUTPATIENT
Start: 2020-06-09 | End: 2020-06-09

## 2020-06-09 RX ORDER — CISPLATIN 1 MG/ML
76 INJECTION, SOLUTION INTRAVENOUS ONCE
Refills: 0 | Status: COMPLETED | OUTPATIENT
Start: 2020-06-09 | End: 2020-06-09

## 2020-06-09 RX ORDER — SODIUM CHLORIDE 9 MG/ML
1000 INJECTION INTRAMUSCULAR; INTRAVENOUS; SUBCUTANEOUS
Refills: 0 | Status: DISCONTINUED | OUTPATIENT
Start: 2020-06-09 | End: 2021-01-17

## 2020-06-09 RX ORDER — FUROSEMIDE 40 MG
40 TABLET ORAL ONCE
Refills: 0 | Status: COMPLETED | OUTPATIENT
Start: 2020-06-09 | End: 2020-06-09

## 2020-06-09 RX ORDER — SODIUM CHLORIDE 9 MG/ML
500 INJECTION INTRAMUSCULAR; INTRAVENOUS; SUBCUTANEOUS
Refills: 0 | Status: COMPLETED | OUTPATIENT
Start: 2020-06-09 | End: 2020-06-09

## 2020-06-09 RX ORDER — FOSAPREPITANT DIMEGLUMINE 150 MG/5ML
150 INJECTION, POWDER, LYOPHILIZED, FOR SOLUTION INTRAVENOUS ONCE
Refills: 0 | Status: COMPLETED | OUTPATIENT
Start: 2020-06-09 | End: 2020-06-09

## 2020-06-09 RX ADMIN — SODIUM CHLORIDE 1000 MILLILITER(S): 9 INJECTION INTRAMUSCULAR; INTRAVENOUS; SUBCUTANEOUS at 13:30

## 2020-06-09 RX ADMIN — FOSAPREPITANT DIMEGLUMINE 300 MILLIGRAM(S): 150 INJECTION, POWDER, LYOPHILIZED, FOR SOLUTION INTRAVENOUS at 13:25

## 2020-06-09 RX ADMIN — SODIUM CHLORIDE 500 MILLILITER(S): 9 INJECTION INTRAMUSCULAR; INTRAVENOUS; SUBCUTANEOUS at 13:41

## 2020-06-09 RX ADMIN — FOSAPREPITANT DIMEGLUMINE 150 MILLIGRAM(S): 150 INJECTION, POWDER, LYOPHILIZED, FOR SOLUTION INTRAVENOUS at 13:45

## 2020-06-09 RX ADMIN — SODIUM CHLORIDE 500 MILLILITER(S): 9 INJECTION INTRAMUSCULAR; INTRAVENOUS; SUBCUTANEOUS at 11:50

## 2020-06-09 RX ADMIN — CISPLATIN 434.67 MILLIGRAM(S): 1 INJECTION, SOLUTION INTRAVENOUS at 13:41

## 2020-06-09 RX ADMIN — Medication 122 MILLIGRAM(S): at 13:04

## 2020-06-09 RX ADMIN — SODIUM CHLORIDE 500 MILLILITER(S): 9 INJECTION INTRAMUSCULAR; INTRAVENOUS; SUBCUTANEOUS at 16:00

## 2020-06-09 RX ADMIN — Medication 12 MILLIGRAM(S): at 13:25

## 2020-06-09 RX ADMIN — CISPLATIN 76 MILLIGRAM(S): 1 INJECTION, SOLUTION INTRAVENOUS at 15:00

## 2020-06-09 RX ADMIN — Medication 40 MILLIGRAM(S): at 13:40

## 2020-06-09 NOTE — HISTORY OF PRESENT ILLNESS
[Home] : at home, [unfilled] , at the time of the visit. [Medical Office: ([facility])___] : at the medical office located in  [FreeTextEntry1] : Today's telehealth visit, Pt reports, intermit post menopausal bleeding for 3 -4  years, using 1 pad/day  Pt had GYN visit, requested by Dr. Hall which lead to her in having  D & C - revealing  Invasive squamous cell carcinoma, moderately to poorly differentiated. Her PMHx includes right lumpectomy, microinvasive hormone receptor breast cancer on adjuvant femara since July 2015.\par \par Pt reports, she will start chemo tomorrow 4/17/20, 1X week for 6 weeks with Dr. Hall.  Pt was informed no surgical intervention at this time.. As per pt, she will need radiation along with chemo. No recent PET scan - pt refusing further work-up, no other diagnostics except for surgical path.\par \par Pt reports, "feeling newby good except for being "tired" over the last 6 months. Pt is  from her spouse.  Planning to go live with sister and niece.  Pt reports of being independent with her ADLs.  current weight is 173 lbs / Height of 5.6\par Patient communication via telephone conducted to discuss Radiation therapy.

## 2020-06-09 NOTE — PHYSICAL EXAM
[Normal] : normoactive bowel sounds, soft and nontender, no hepatosplenomegaly or masses appreciated [de-identified] : Minimal change in the right (treated) breast.  Benign firmness at 2 o'clock of the lumpectomy cavity c/w post treatment change.  [de-identified] : Pelvis exam was deferred this week.

## 2020-06-09 NOTE — DISEASE MANAGEMENT
[Clinical] : TNM Stage: c [IB] : IB [TTNM] : 1b [NTNM] : 0 [MTNM] : 0 [de-identified] : 1080cGy [de-identified] : Cervix [de-identified] : 9090cGy

## 2020-06-12 ENCOUNTER — LABORATORY RESULT (OUTPATIENT)
Age: 60
End: 2020-06-12

## 2020-06-12 ENCOUNTER — APPOINTMENT (OUTPATIENT)
Dept: RADIATION ONCOLOGY | Facility: CLINIC | Age: 60
End: 2020-06-12

## 2020-06-12 ENCOUNTER — APPOINTMENT (OUTPATIENT)
Dept: HEMATOLOGY ONCOLOGY | Facility: CLINIC | Age: 60
End: 2020-06-12

## 2020-06-15 ENCOUNTER — LABORATORY RESULT (OUTPATIENT)
Age: 60
End: 2020-06-15

## 2020-06-15 ENCOUNTER — APPOINTMENT (OUTPATIENT)
Dept: HEMATOLOGY ONCOLOGY | Facility: CLINIC | Age: 60
End: 2020-06-15
Payer: MEDICAID

## 2020-06-15 VITALS
TEMPERATURE: 97.2 F | BODY MASS INDEX: 28.93 KG/M2 | WEIGHT: 180 LBS | HEIGHT: 66 IN | DIASTOLIC BLOOD PRESSURE: 83 MMHG | HEART RATE: 105 BPM | SYSTOLIC BLOOD PRESSURE: 144 MMHG

## 2020-06-15 DIAGNOSIS — R19.7 DIARRHEA, UNSPECIFIED: ICD-10-CM

## 2020-06-15 DIAGNOSIS — G47.00 INSOMNIA, UNSPECIFIED: ICD-10-CM

## 2020-06-15 LAB
ALBUMIN SERPL ELPH-MCNC: 4.2 G/DL
ALP BLD-CCNC: 100 U/L
ALT SERPL-CCNC: 28 U/L
ANION GAP SERPL CALC-SCNC: 12 MMOL/L
AST SERPL-CCNC: 21 U/L
BILIRUB SERPL-MCNC: <0.2 MG/DL
BUN SERPL-MCNC: 9 MG/DL
CALCIUM SERPL-MCNC: 9.5 MG/DL
CHLORIDE SERPL-SCNC: 100 MMOL/L
CO2 SERPL-SCNC: 26 MMOL/L
CREAT SERPL-MCNC: 0.6 MG/DL
GLUCOSE SERPL-MCNC: 116 MG/DL
HCT VFR BLD CALC: 39.1 %
HGB BLD-MCNC: 12.5 G/DL
MAGNESIUM SERPL-MCNC: 1.8 MG/DL
MCHC RBC-ENTMCNC: 28.2 PG
MCHC RBC-ENTMCNC: 32 G/DL
MCV RBC AUTO: 88.1 FL
PLATELET # BLD AUTO: 273 K/UL
PMV BLD: 9.7 FL
POTASSIUM SERPL-SCNC: 4.3 MMOL/L
PROT SERPL-MCNC: 6.7 G/DL
RBC # BLD: 4.44 M/UL
RBC # FLD: 13.2 %
SODIUM SERPL-SCNC: 138 MMOL/L
WBC # FLD AUTO: 5.44 K/UL

## 2020-06-15 PROCEDURE — 99215 OFFICE O/P EST HI 40 MIN: CPT

## 2020-06-15 NOTE — ASSESSMENT
[FreeTextEntry1] : 59 yr old postmenopausal female with microinvasive hormone receptor positive breast cancer on adjuvant Femara since Sept 2015. \par New diagnosis of early stage cervical cancer ( Stage IIA1) s/p conization on 3/2/2020\par \par PLAN\par -- For her DCIS: Continue Femara, Ca + Vit D - for a total of 5 yrs (September 2020) \par The side effects from such treatment were discussed in detail including but not limited to hot flashes, weight gain, hair thinning, arthralgia, myalgia, bone loss, increased risk of osteoporotic fractures and thrombo-embolism.\par She will take Ca + VIt D daily while on AI.\par Her compliance and any side effects from such treatment were assessed at today's visit\par Advised to stop smoking.\par DEXA scan to be repeated in July 2020.\par She needs left breast US to evaluate left breast hypodensity seen on previous mammo and US from June 2019. \par PT IS VERY NON COMPLIANT, asking to hold US breast for now , so she can deal with her new diagnosis of cervical cancer.\par \par -- New diagnosis of stage IIA1 poorly to moderately differentiated cervical squamous cell carcinoma, Per MRI results the cancer is more extensive than initially thought. Last PET done 5/2020\par Pt is s/p 2 doses of Cisplatin which she tolerated well.\par Cisplatin will be administered at a dose of 40 mg/m2 on days 1, 8, 15, 22, 29, and 36 with RT.\par Proceed with day 15 of Cisplatin, counts are adequate\par Adequate precautions with antiemetics and IV hydration will will be undertaken.\par Pt was advised to maintain adequate oral hydration with fluids and electrolytes.\par Side effects incl but not limited to nausea, vomiting, myelosuppression, nephrotoxicity, risk of sepsis, electrolyte imbalances, neuropathy, hearing loss etc were discussed.\par \par The chemotherapy dose was adjusted according to pt's height, weight, labs and anticipated tolerance.\par The high risk of complications and complexity associated with antineoplastic therapy administration has been explained to the pt and family.\par Chemotherapy will be administered under my direct supervision\par \par Continue  Prilosec. Pt advised about treatment of chemo/Rad induced diarrhea.\par Advised dietary modification and use of imodium.\par Pt advised to hold off on caffeine.\par \par We will coordinate care with Rad/Onc. \par \par RTC in 1 week\par \par

## 2020-06-15 NOTE — PHYSICAL EXAM
[Fully active, able to carry on all pre-disease performance without restriction] : Status 0 - Fully active, able to carry on all pre-disease performance without restriction [Normal] : no peripheral adenopathy appreciated [de-identified] : pt refused breast exam

## 2020-06-15 NOTE — PAST MEDICAL HISTORY
[Menarche Age ____] : age at menarche was [unfilled] [Postmenopausal] : The patient is postmenopausal [Total Preg ___] : G[unfilled] [Menopause Age____] : age at menopause was [unfilled] [Age At Live Birth ___] : Age at live birth: [unfilled] [Live Births ___] : P[unfilled]  [History of Hormone Replacement Treatment] : has no history of hormone replacement treatment [FreeTextEntry7] : none [FreeTextEntry5] : none [FreeTextEntry6] : none [FreeTextEntry8] : none

## 2020-06-15 NOTE — CONSULT LETTER
[Dear  ___] : Dear  [unfilled], [Consult Closing:] : Thank you very much for allowing me to participate in the care of this patient.  If you have any questions, please do not hesitate to contact me. [Please see my note below.] : Please see my note below. [Consult Letter:] : I had the pleasure of evaluating your patient, [unfilled]. [Sincerely,] : Sincerely, [FreeTextEntry3] : Palmer Hall MD\par Professor [employer]\par Viv[employer]Di\par Attending Physician\par [facility] [place]\par [phone]\par \par

## 2020-06-15 NOTE — HISTORY OF PRESENT ILLNESS
[Disease: _____________________] : Disease: [unfilled] [T: ___] : T[unfilled] [M: ___] : M[unfilled] [N: ___] : N[unfilled] [AJCC Stage: ____] : AJCC Stage: [unfilled] [de-identified] : Patient is a 57 year old year-old postmenopausal female transferring her care to me. \par \par She has history of ER and FL positive 0.6 mm well, differentiated, microinvasive ductal carcinoma  ER/FL (+), HER2 (-). status post surgery currently on adjuvant Femara started in July 2015. \par She is s/p Right NLOC/SNB/RF Spect/XOFT 06/15/15 - 0/1 (-); DCIS intermed grade, no residual IDC; (-) margins\par Patient is taking calcium and vitamin D .\par She is refusing Pap smears.\par No FHx breast/ovarian cancer.\par  [de-identified] : IDC [de-identified] : ER and [place] positive, and her-2 negative [de-identified] : < 1mm (microinvasive)\par UOQ [de-identified] : She has not been here since 11/18/16. However she states she has been compliant with Femara.\par She does not exercise.\par Her appetite is good.\par She denies any adverse events.\par \par 7/30/18:\par Doing well. Mammo in July 2018 was normal. DEXA in July 2018 showed osteopenia. \par C/o burning and pain while passing urine. Started few days ago, resolved by itself and recurred last night. Did not take any Abx for UTI. \par Compliant with Letrozole. Denies fever, nausea, vomiting, chest pain, SOB, abdominal pain, bowel problems.\par Colonoscopy done in 2017 and it was normal. \par \par 01/28/2019\par Patient is here for a follow up visit. \par C/O of weight gain and increased appetite. But otherwise tolerating letrozole well. \par Mammogram due in  july 2019. \par Denies fever, nausea, vomiting, chest pain, SOB, abdominal pain, bowel problems.\par Colonoscopy done in 2017 and it was normal. \par \par 7/8/19\par pt is here for follow up.\par Denies fever, nausea, vomiting, chest pain, SOB, abdominal pain, bowel problems.\par she had felt a lump under her left axilla, had mamamo/usg done neg as noted below.\par compliant with Femara.\par says she was diagnosed with iron deficiency last October??.\par states she was advised IV iron but has been taking po iron.\par Not taking Ca + D\par After extensive questioning pt finally admitted that she has been having vaginal spotting for several months.\par She has not seen a GYN in a long time\par \par 2/10/20\par  Patient here for follow up.  She denies any new complaints.  Patient denies any new palpable breast lumps or pain, denies skin changes, denies nipple discharge.  Patient denies cough, shortness of breath, denies fever, denies bone pain.\par compliant with letrozole.\par Still has vaginal spotting off and on.\par Did not go for USG of pelvis or GYN eval as advised previously.\par Pt states that this is not her first priority.\par Continues to smoke.\par No abdominal pain\par \par 4/13/2020: The patient is here for follow up . She remains on letrozole since July 2015. She denies fever, chills, no weight loss, no chest pain or shortness of breath . She is actively smoking. 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 She underwent conization with endometrial biopsy and vaginal exam on 3/2/2020 . 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 The patient is still reporting vaginal spotting intermittently\par \par \par 5/7/20\par Pt is here for follow up.\par She has completed MRI pelvis and was seen in Rad/Onc.\par No new symptoms\par \par 06/02/2020\par JUAN ANTONIO OSHEA a 59 year F is here today for follow up of Breast Ca and cervical cancer.\par Has been complaint with femara since 9/2015 vitamin and calcium.  Pt does not want to continue Femara while on Chemo and RT. \par Patient denies any new palpable breast lumps or pain, denies skin changes, denies nipple discharge. \par Denies vaginal bleeding, bloating, abdominal pain. Denies dysuria, fever, chills. \par She started RT today and will start weekly cisplatin today.  \par She had PET on 5/14: Focal FDG activity, proximal left lower lobe associated with approximate 8 mm branch point nodule (axial image 195, max SUV 4.2). Follow-up diagnostic CT scan 3 months time recommended. New FDG avid 8 mm left pelvic sidewall lymph node (axial 103, max SUV 9.8), suspicious for biologic tumor activity. \par CBC reviewed. \par \par 6/8/20\par Pt is here for follow up.\par She is s/p 1 weekly dose of Cisplatin which she tolerated well.\par Her only complaint is of heart burn since last week.\par In addition she has been experiencing insomnia.\par No N, V, D.\par Had mild vaginal bleeding.\par No abd pain.\par \par 6/15/20\par Pt is here for follow up.\par Is s/p 2 weekly treatments of cisplatin. Tolerating it well. No N or V.\par Had diarrhea last week which resolved spontaneously.\par No abd pain or vag bleeding.\par No fever.\par C/o insomnia, has been drinking a lot of caffeinated beverages

## 2020-06-16 ENCOUNTER — APPOINTMENT (OUTPATIENT)
Dept: INFUSION THERAPY | Facility: CLINIC | Age: 60
End: 2020-06-16

## 2020-06-16 VITALS
TEMPERATURE: 97.6 F | BODY MASS INDEX: 28.41 KG/M2 | RESPIRATION RATE: 16 BRPM | HEART RATE: 108 BPM | DIASTOLIC BLOOD PRESSURE: 77 MMHG | WEIGHT: 176 LBS | SYSTOLIC BLOOD PRESSURE: 128 MMHG

## 2020-06-16 PROCEDURE — 77427 RADIATION TX MANAGEMENT X5: CPT

## 2020-06-16 RX ORDER — FUROSEMIDE 40 MG
40 TABLET ORAL ONCE
Refills: 0 | Status: COMPLETED | OUTPATIENT
Start: 2020-06-16 | End: 2020-06-16

## 2020-06-16 RX ORDER — FOSAPREPITANT DIMEGLUMINE 150 MG/5ML
150 INJECTION, POWDER, LYOPHILIZED, FOR SOLUTION INTRAVENOUS ONCE
Refills: 0 | Status: COMPLETED | OUTPATIENT
Start: 2020-06-16 | End: 2020-06-16

## 2020-06-16 RX ORDER — MAGNESIUM SULFATE 500 MG/ML
2 VIAL (ML) INJECTION ONCE
Refills: 0 | Status: COMPLETED | OUTPATIENT
Start: 2020-06-16 | End: 2020-06-16

## 2020-06-16 RX ORDER — DEXAMETHASONE 0.5 MG/5ML
12 ELIXIR ORAL ONCE
Refills: 0 | Status: COMPLETED | OUTPATIENT
Start: 2020-06-16 | End: 2020-06-16

## 2020-06-16 RX ORDER — SODIUM CHLORIDE 9 MG/ML
500 INJECTION INTRAMUSCULAR; INTRAVENOUS; SUBCUTANEOUS
Refills: 0 | Status: COMPLETED | OUTPATIENT
Start: 2020-06-16 | End: 2020-06-16

## 2020-06-16 RX ORDER — CISPLATIN 1 MG/ML
76 INJECTION, SOLUTION INTRAVENOUS ONCE
Refills: 0 | Status: COMPLETED | OUTPATIENT
Start: 2020-06-16 | End: 2020-06-16

## 2020-06-16 RX ORDER — SODIUM CHLORIDE 9 MG/ML
1000 INJECTION INTRAMUSCULAR; INTRAVENOUS; SUBCUTANEOUS
Refills: 0 | Status: DISCONTINUED | OUTPATIENT
Start: 2020-06-16 | End: 2021-01-17

## 2020-06-16 RX ADMIN — SODIUM CHLORIDE 500 MILLILITER(S): 9 INJECTION INTRAMUSCULAR; INTRAVENOUS; SUBCUTANEOUS at 15:15

## 2020-06-16 RX ADMIN — FOSAPREPITANT DIMEGLUMINE 300 MILLIGRAM(S): 150 INJECTION, POWDER, LYOPHILIZED, FOR SOLUTION INTRAVENOUS at 11:35

## 2020-06-16 RX ADMIN — SODIUM CHLORIDE 1000 MILLILITER(S): 9 INJECTION INTRAMUSCULAR; INTRAVENOUS; SUBCUTANEOUS at 14:30

## 2020-06-16 RX ADMIN — FOSAPREPITANT DIMEGLUMINE 150 MILLIGRAM(S): 150 INJECTION, POWDER, LYOPHILIZED, FOR SOLUTION INTRAVENOUS at 11:55

## 2020-06-16 RX ADMIN — Medication 122 MILLIGRAM(S): at 11:16

## 2020-06-16 RX ADMIN — CISPLATIN 76 MILLIGRAM(S): 1 INJECTION, SOLUTION INTRAVENOUS at 13:30

## 2020-06-16 RX ADMIN — Medication 2 GRAM(S): at 15:25

## 2020-06-16 RX ADMIN — Medication 40 MILLIGRAM(S): at 12:20

## 2020-06-16 RX ADMIN — Medication 12 MILLIGRAM(S): at 11:36

## 2020-06-16 RX ADMIN — SODIUM CHLORIDE 500 MILLILITER(S): 9 INJECTION INTRAMUSCULAR; INTRAVENOUS; SUBCUTANEOUS at 10:30

## 2020-06-16 RX ADMIN — SODIUM CHLORIDE 500 MILLILITER(S): 9 INJECTION INTRAMUSCULAR; INTRAVENOUS; SUBCUTANEOUS at 14:17

## 2020-06-16 RX ADMIN — Medication 50 GRAM(S): at 14:25

## 2020-06-16 RX ADMIN — CISPLATIN 434.67 MILLIGRAM(S): 1 INJECTION, SOLUTION INTRAVENOUS at 12:25

## 2020-06-16 NOTE — DISEASE MANAGEMENT
[Clinical] : TNM Stage: c [IB] : IB [TTNM] : 1b [MTNM] : 0 [NTNM] : 0 [de-identified] : 1980cGy [de-identified] : 4460cGy [de-identified] : Cervix

## 2020-06-16 NOTE — HISTORY OF PRESENT ILLNESS
[Medical Office: ([facility])___] : at the medical office located in  [Home] : at home, [unfilled] , at the time of the visit. [FreeTextEntry1] : Today's telehealth visit, Pt reports, intermit post menopausal bleeding for 3 -4  years, using 1 pad/day  Pt had GYN visit, requested by Dr. Hall which lead to her in having  D & C - revealing  Invasive squamous cell carcinoma, moderately to poorly differentiated. Her PMHx includes right lumpectomy, microinvasive hormone receptor breast cancer on adjuvant femara since July 2015.\par \par Pt reports, she will start chemo tomorrow 4/17/20, 1X week for 6 weeks with Dr. Hall.  Pt was informed no surgical intervention at this time.. As per pt, she will need radiation along with chemo. No recent PET scan - pt refusing further work-up, no other diagnostics except for surgical path.\par \par Pt reports, "feeling newby good except for being "tired" over the last 6 months. Pt is  from her spouse.  Planning to go live with sister and niece.  Pt reports of being independent with her ADLs.  current weight is 173 lbs / Height of 5.6\par Patient communication via telephone conducted to discuss Radiation therapy.

## 2020-06-16 NOTE — PHYSICAL EXAM
[Normal] : normoactive bowel sounds, soft and nontender, no hepatosplenomegaly or masses appreciated [de-identified] : Deferred to next week.

## 2020-06-23 ENCOUNTER — LABORATORY RESULT (OUTPATIENT)
Age: 60
End: 2020-06-23

## 2020-06-23 ENCOUNTER — APPOINTMENT (OUTPATIENT)
Dept: INFUSION THERAPY | Facility: CLINIC | Age: 60
End: 2020-06-23

## 2020-06-23 LAB
ALBUMIN SERPL ELPH-MCNC: 4.2 G/DL
ALP BLD-CCNC: 104 U/L
ALT SERPL-CCNC: 51 U/L
ANION GAP SERPL CALC-SCNC: 13 MMOL/L
AST SERPL-CCNC: 34 U/L
BILIRUB SERPL-MCNC: 0.3 MG/DL
BUN SERPL-MCNC: 16 MG/DL
CALCIUM SERPL-MCNC: 9.2 MG/DL
CHLORIDE SERPL-SCNC: 103 MMOL/L
CO2 SERPL-SCNC: 26 MMOL/L
CREAT SERPL-MCNC: 0.8 MG/DL
GLUCOSE SERPL-MCNC: 128 MG/DL
HCT VFR BLD CALC: 38 %
HGB BLD-MCNC: 12.8 G/DL
MAGNESIUM SERPL-MCNC: 1.7 MG/DL
MCHC RBC-ENTMCNC: 28.8 PG
MCHC RBC-ENTMCNC: 33.7 G/DL
MCV RBC AUTO: 85.4 FL
PLATELET # BLD AUTO: 224 K/UL
PMV BLD: 9.8 FL
POTASSIUM SERPL-SCNC: 4.9 MMOL/L
PROT SERPL-MCNC: 6.7 G/DL
RBC # BLD: 4.45 M/UL
RBC # FLD: 13.7 %
SODIUM SERPL-SCNC: 142 MMOL/L
WBC # FLD AUTO: 4.09 K/UL

## 2020-06-23 PROCEDURE — 77427 RADIATION TX MANAGEMENT X5: CPT

## 2020-06-23 RX ORDER — CISPLATIN 1 MG/ML
76 INJECTION, SOLUTION INTRAVENOUS ONCE
Refills: 0 | Status: COMPLETED | OUTPATIENT
Start: 2020-06-23 | End: 2020-06-23

## 2020-06-23 RX ORDER — FUROSEMIDE 40 MG
40 TABLET ORAL ONCE
Refills: 0 | Status: COMPLETED | OUTPATIENT
Start: 2020-06-23 | End: 2020-06-23

## 2020-06-23 RX ORDER — DEXAMETHASONE 0.5 MG/5ML
12 ELIXIR ORAL ONCE
Refills: 0 | Status: COMPLETED | OUTPATIENT
Start: 2020-06-23 | End: 2020-06-23

## 2020-06-23 RX ORDER — MAGNESIUM SULFATE 500 MG/ML
2 VIAL (ML) INJECTION ONCE
Refills: 0 | Status: COMPLETED | OUTPATIENT
Start: 2020-06-23 | End: 2020-06-23

## 2020-06-23 RX ORDER — SODIUM CHLORIDE 9 MG/ML
500 INJECTION INTRAMUSCULAR; INTRAVENOUS; SUBCUTANEOUS
Refills: 0 | Status: COMPLETED | OUTPATIENT
Start: 2020-06-23 | End: 2020-06-23

## 2020-06-23 RX ORDER — SODIUM CHLORIDE 9 MG/ML
1000 INJECTION INTRAMUSCULAR; INTRAVENOUS; SUBCUTANEOUS
Refills: 0 | Status: DISCONTINUED | OUTPATIENT
Start: 2020-06-23 | End: 2021-01-17

## 2020-06-23 RX ORDER — FOSAPREPITANT DIMEGLUMINE 150 MG/5ML
150 INJECTION, POWDER, LYOPHILIZED, FOR SOLUTION INTRAVENOUS ONCE
Refills: 0 | Status: COMPLETED | OUTPATIENT
Start: 2020-06-23 | End: 2020-06-23

## 2020-06-23 RX ADMIN — Medication 40 MILLIGRAM(S): at 12:54

## 2020-06-23 RX ADMIN — CISPLATIN 434.67 MILLIGRAM(S): 1 INJECTION, SOLUTION INTRAVENOUS at 14:46

## 2020-06-23 RX ADMIN — Medication 50 GRAM(S): at 14:24

## 2020-06-23 RX ADMIN — FOSAPREPITANT DIMEGLUMINE 300 MILLIGRAM(S): 150 INJECTION, POWDER, LYOPHILIZED, FOR SOLUTION INTRAVENOUS at 12:53

## 2020-06-23 RX ADMIN — Medication 2 GRAM(S): at 15:25

## 2020-06-23 RX ADMIN — SODIUM CHLORIDE 500 MILLILITER(S): 9 INJECTION INTRAMUSCULAR; INTRAVENOUS; SUBCUTANEOUS at 12:53

## 2020-06-23 RX ADMIN — Medication 122 MILLIGRAM(S): at 12:53

## 2020-06-23 RX ADMIN — SODIUM CHLORIDE 500 MILLILITER(S): 9 INJECTION INTRAMUSCULAR; INTRAVENOUS; SUBCUTANEOUS at 12:54

## 2020-06-25 VITALS
BODY MASS INDEX: 28.61 KG/M2 | DIASTOLIC BLOOD PRESSURE: 83 MMHG | RESPIRATION RATE: 16 BRPM | SYSTOLIC BLOOD PRESSURE: 123 MMHG | WEIGHT: 177.25 LBS | TEMPERATURE: 97.9 F | HEART RATE: 99 BPM

## 2020-06-25 NOTE — DISEASE MANAGEMENT
[Clinical] : TNM Stage: c [IB] : IB [TTNM] : 1b [NTNM] : 0 [MTNM] : 0 [de-identified] : 8572cFm [de-identified] : Cervix [de-identified] : 5580cGy

## 2020-06-25 NOTE — HISTORY OF PRESENT ILLNESS
[Home] : at home, [unfilled] , at the time of the visit. [Medical Office: ([facility])___] : at the medical office located in  [FreeTextEntry1] : 6/25/20 OTV: 3240cGy/4500cGy to the cervix. pt c/o fatigue. heartburn and diarrhea under control. denies any nausea, vomit, chest pain, SOB, cough. Exam done by . \par \par 6/16/20 OTV: 1980cGy/4800cGy to the cervix. pt seen today in no distress. pt experiencing fatigue. pt started taking Imodium for diarrhea and experiencing relief. pt has pain r/t to arthritis in her back. denies any chest pain, SOB, cough.  \par \par 6/9/2020 OTV: 1080cGy/4500cGy to the cervix. c/o pain related to arthritis to her back. states she has been feeling increased fatigue since last week. pt denies any N/V/D, chest pain, SOB. educated on skin care and diet. verbalized understanding. \par \par 6/2/20 OTV: 180cGy/4500cGy to the cervix/pelvis. pt seen today with no complaints. pt denies any N/V/D. denies cough, SOB, chest pain. pt is on concurrent chemo and has first course today. pt takes oxy for arthritis pain.

## 2020-06-25 NOTE — PHYSICAL EXAM
[Normal] : normal heart rate and rhythm, normal S1 and S2, and no murmurs present [de-identified] : On digital vaginal exam, I could not feel tumor.  On speculum patient had a difficult relaxing but did reach the cervix and again no tumor was seen.  There was no bleeding.

## 2020-06-29 ENCOUNTER — LABORATORY RESULT (OUTPATIENT)
Age: 60
End: 2020-06-29

## 2020-06-29 ENCOUNTER — APPOINTMENT (OUTPATIENT)
Dept: HEMATOLOGY ONCOLOGY | Facility: CLINIC | Age: 60
End: 2020-06-29
Payer: MEDICAID

## 2020-06-29 VITALS
SYSTOLIC BLOOD PRESSURE: 130 MMHG | WEIGHT: 174 LBS | DIASTOLIC BLOOD PRESSURE: 61 MMHG | BODY MASS INDEX: 27.97 KG/M2 | HEART RATE: 122 BPM | TEMPERATURE: 98.1 F | HEIGHT: 66 IN

## 2020-06-29 LAB
HCT VFR BLD CALC: 37.1 %
HGB BLD-MCNC: 12.3 G/DL
MCHC RBC-ENTMCNC: 28.2 PG
MCHC RBC-ENTMCNC: 33.2 G/DL
MCV RBC AUTO: 85.1 FL
PLATELET # BLD AUTO: 232 K/UL
PMV BLD: 10 FL
RBC # BLD: 4.36 M/UL
RBC # FLD: 13.9 %
WBC # FLD AUTO: 3.55 K/UL

## 2020-06-29 PROCEDURE — 99215 OFFICE O/P EST HI 40 MIN: CPT

## 2020-06-30 ENCOUNTER — APPOINTMENT (OUTPATIENT)
Dept: INFUSION THERAPY | Facility: CLINIC | Age: 60
End: 2020-06-30

## 2020-06-30 LAB
ALBUMIN SERPL ELPH-MCNC: 4.5 G/DL
ALP BLD-CCNC: 111 U/L
ALT SERPL-CCNC: 43 U/L
ANION GAP SERPL CALC-SCNC: 17 MMOL/L
AST SERPL-CCNC: 26 U/L
BILIRUB SERPL-MCNC: <0.2 MG/DL
BUN SERPL-MCNC: 15 MG/DL
CALCIUM SERPL-MCNC: 9.7 MG/DL
CHLORIDE SERPL-SCNC: 101 MMOL/L
CO2 SERPL-SCNC: 24 MMOL/L
CREAT SERPL-MCNC: 0.8 MG/DL
GLUCOSE SERPL-MCNC: 106 MG/DL
MAGNESIUM SERPL-MCNC: 1.7 MG/DL
POTASSIUM SERPL-SCNC: 3.8 MMOL/L
PROT SERPL-MCNC: 6.9 G/DL
SODIUM SERPL-SCNC: 142 MMOL/L

## 2020-06-30 PROCEDURE — 77014: CPT | Mod: 26

## 2020-06-30 RX ORDER — FOSAPREPITANT DIMEGLUMINE 150 MG/5ML
150 INJECTION, POWDER, LYOPHILIZED, FOR SOLUTION INTRAVENOUS ONCE
Refills: 0 | Status: COMPLETED | OUTPATIENT
Start: 2020-06-30 | End: 2020-06-30

## 2020-06-30 RX ORDER — SODIUM CHLORIDE 9 MG/ML
1000 INJECTION INTRAMUSCULAR; INTRAVENOUS; SUBCUTANEOUS
Refills: 0 | Status: DISCONTINUED | OUTPATIENT
Start: 2020-06-30 | End: 2021-01-17

## 2020-06-30 RX ORDER — CISPLATIN 1 MG/ML
76 INJECTION, SOLUTION INTRAVENOUS ONCE
Refills: 0 | Status: COMPLETED | OUTPATIENT
Start: 2020-06-30 | End: 2020-06-30

## 2020-06-30 RX ORDER — SODIUM CHLORIDE 9 MG/ML
500 INJECTION INTRAMUSCULAR; INTRAVENOUS; SUBCUTANEOUS
Refills: 0 | Status: DISCONTINUED | OUTPATIENT
Start: 2020-06-30 | End: 2021-01-17

## 2020-06-30 RX ORDER — MAGNESIUM SULFATE 500 MG/ML
2 VIAL (ML) INJECTION ONCE
Refills: 0 | Status: COMPLETED | OUTPATIENT
Start: 2020-06-30 | End: 2020-06-30

## 2020-06-30 RX ORDER — DEXAMETHASONE 0.5 MG/5ML
12 ELIXIR ORAL ONCE
Refills: 0 | Status: COMPLETED | OUTPATIENT
Start: 2020-06-30 | End: 2020-06-30

## 2020-06-30 RX ORDER — FUROSEMIDE 40 MG
40 TABLET ORAL ONCE
Refills: 0 | Status: COMPLETED | OUTPATIENT
Start: 2020-06-30 | End: 2020-06-30

## 2020-06-30 RX ADMIN — SODIUM CHLORIDE 500 MILLILITER(S): 9 INJECTION INTRAMUSCULAR; INTRAVENOUS; SUBCUTANEOUS at 11:51

## 2020-06-30 RX ADMIN — Medication 2 GRAM(S): at 12:51

## 2020-06-30 RX ADMIN — Medication 122 MILLIGRAM(S): at 11:51

## 2020-06-30 RX ADMIN — SODIUM CHLORIDE 500 MILLILITER(S): 9 INJECTION INTRAMUSCULAR; INTRAVENOUS; SUBCUTANEOUS at 11:00

## 2020-06-30 RX ADMIN — Medication 50 GRAM(S): at 11:50

## 2020-06-30 RX ADMIN — SODIUM CHLORIDE 1000 MILLILITER(S): 9 INJECTION INTRAMUSCULAR; INTRAVENOUS; SUBCUTANEOUS at 13:00

## 2020-06-30 RX ADMIN — SODIUM CHLORIDE 500 MILLILITER(S): 9 INJECTION INTRAMUSCULAR; INTRAVENOUS; SUBCUTANEOUS at 15:30

## 2020-06-30 RX ADMIN — CISPLATIN 76 MILLIGRAM(S): 1 INJECTION, SOLUTION INTRAVENOUS at 15:40

## 2020-06-30 RX ADMIN — CISPLATIN 434.67 MILLIGRAM(S): 1 INJECTION, SOLUTION INTRAVENOUS at 13:40

## 2020-06-30 RX ADMIN — FOSAPREPITANT DIMEGLUMINE 300 MILLIGRAM(S): 150 INJECTION, POWDER, LYOPHILIZED, FOR SOLUTION INTRAVENOUS at 13:15

## 2020-06-30 RX ADMIN — Medication 12 MILLIGRAM(S): at 13:15

## 2020-06-30 RX ADMIN — Medication 40 MILLIGRAM(S): at 13:20

## 2020-06-30 RX ADMIN — FOSAPREPITANT DIMEGLUMINE 150 MILLIGRAM(S): 150 INJECTION, POWDER, LYOPHILIZED, FOR SOLUTION INTRAVENOUS at 13:35

## 2020-07-01 PROCEDURE — 77427 RADIATION TX MANAGEMENT X5: CPT

## 2020-07-02 VITALS
WEIGHT: 174.5 LBS | SYSTOLIC BLOOD PRESSURE: 115 MMHG | TEMPERATURE: 97.1 F | OXYGEN SATURATION: 98 % | BODY MASS INDEX: 28.17 KG/M2 | RESPIRATION RATE: 12 BRPM | DIASTOLIC BLOOD PRESSURE: 77 MMHG

## 2020-07-03 ENCOUNTER — OUTPATIENT (OUTPATIENT)
Dept: OUTPATIENT SERVICES | Facility: HOSPITAL | Age: 60
LOS: 1 days | Discharge: HOME | End: 2020-07-03

## 2020-07-03 ENCOUNTER — LABORATORY RESULT (OUTPATIENT)
Age: 60
End: 2020-07-03

## 2020-07-03 DIAGNOSIS — Z11.59 ENCOUNTER FOR SCREENING FOR OTHER VIRAL DISEASES: ICD-10-CM

## 2020-07-03 DIAGNOSIS — Z90.49 ACQUIRED ABSENCE OF OTHER SPECIFIED PARTS OF DIGESTIVE TRACT: Chronic | ICD-10-CM

## 2020-07-03 DIAGNOSIS — Z98.890 OTHER SPECIFIED POSTPROCEDURAL STATES: Chronic | ICD-10-CM

## 2020-07-03 DIAGNOSIS — Z90.89 ACQUIRED ABSENCE OF OTHER ORGANS: Chronic | ICD-10-CM

## 2020-07-03 NOTE — ASSESSMENT
[FreeTextEntry1] : 59 yr old postmenopausal female with microinvasive hormone receptor positive breast cancer on adjuvant Femara since Sept 2015. \par New diagnosis of early stage cervical cancer ( Stage IIA1) s/p conization on 3/2/2020\par \par PLAN\par -- For her DCIS: Continue Femara, Ca + Vit D - for a total of 5 yrs (September 2020) \par The side effects from such treatment were discussed in detail including but not limited to hot flashes, weight gain, hair thinning, arthralgia, myalgia, bone loss, increased risk of osteoporotic fractures and thrombo-embolism.\par She will take Ca + VIt D daily while on AI.\par Her compliance and any side effects from such treatment were assessed at today's visit\par Advised to stop smoking.\par DEXA scan to be repeated in July 2020.\par She needs left breast US to evaluate left breast hypodensity seen on previous mammo and US from June 2019. \par PT IS VERY NON COMPLIANT, asking to hold US breast for now , so she can deal with her new diagnosis of cervical cancer.\par \par -- New diagnosis of stage IIA1 poorly to moderately differentiated cervical squamous cell carcinoma, Per MRI results the cancer is more extensive than initially thought. Last PET done 5/2020\par Pt is s/p 2 doses of Cisplatin which she tolerated well.\par Cisplatin will be administered at a dose of 40 mg/m2 on days 1, 8, 15, 22, 29, and 36 with RT.\par Proceed with day 22 of Cisplatin, counts are adequate\par Adequate precautions with antiemetics and IV hydration will will be undertaken.\par Pt was advised to maintain adequate oral hydration with fluids and electrolytes.\par Side effects incl but not limited to nausea, vomiting, myelosuppression, nephrotoxicity, risk of sepsis, electrolyte imbalances, neuropathy, hearing loss etc were discussed.\par \par The chemotherapy dose was adjusted according to pt's height, weight, labs and anticipated tolerance.\par The high risk of complications and complexity associated with antineoplastic therapy administration has been explained to the pt and family.\par Chemotherapy will be administered under my direct supervision\par \par Continue  Prilosec. Pt advised about treatment of chemo/Rad induced diarrhea.\par Advised dietary modification and use of imodium.\par \par We will coordinate care with Rad/Onc. \par \par RTC in 1 week\par \par

## 2020-07-03 NOTE — CONSULT LETTER
[Dear  ___] : Dear  [unfilled], [Consult Letter:] : I had the pleasure of evaluating your patient, [unfilled]. [Consult Closing:] : Thank you very much for allowing me to participate in the care of this patient.  If you have any questions, please do not hesitate to contact me. [Please see my note below.] : Please see my note below. [Sincerely,] : Sincerely, [FreeTextEntry3] : Palmer Hall MD\par Professor [employer]\par Viv/Di\par Attending Physician\par [facility] [place]\par [phone]\par \par

## 2020-07-03 NOTE — HISTORY OF PRESENT ILLNESS
[T: ___] : T[unfilled] [N: ___] : N[unfilled] [Disease: _____________________] : Disease: [unfilled] [AJCC Stage: ____] : AJCC Stage: [unfilled] [M: ___] : M[unfilled] [de-identified] : Patient is a 57 year old year-old postmenopausal female transferring her care to me. \par \par She has history of ER and ID positive 0.6 mm well, differentiated, microinvasive ductal carcinoma  ER/ID (+), HER2 (-). status post surgery currently on adjuvant Femara started in July 2015. \par She is s/p Right NLOC/SNB/RF Spect/XOFT 06/15/15 - 0/1 (-); DCIS intermed grade, no residual IDC; (-) margins\par Patient is taking calcium and vitamin D .\par She is refusing Pap smears.\par No FHx breast/ovarian cancer.\par  [de-identified] : IDC [de-identified] : ER and [place] positive, and her-2 negative [de-identified] : < 1mm (microinvasive)\par UOQ [de-identified] : She has not been here since 11/18/16. However she states she has been compliant with Femara.\par She does not exercise.\par Her appetite is good.\par She denies any adverse events.\par \par 7/30/18:\par Doing well. Mammo in July 2018 was normal. DEXA in July 2018 showed osteopenia. \par C/o burning and pain while passing urine. Started few days ago, resolved by itself and recurred last night. Did not take any Abx for UTI. \par Compliant with Letrozole. Denies fever, nausea, vomiting, chest pain, SOB, abdominal pain, bowel problems.\par Colonoscopy done in 2017 and it was normal. \par \par 01/28/2019\par Patient is here for a follow up visit. \par C/O of weight gain and increased appetite. But otherwise tolerating letrozole well. \par Mammogram due in  july 2019. \par Denies fever, nausea, vomiting, chest pain, SOB, abdominal pain, bowel problems.\par Colonoscopy done in 2017 and it was normal. \par \par 7/8/19\par pt is here for follow up.\par Denies fever, nausea, vomiting, chest pain, SOB, abdominal pain, bowel problems.\par she had felt a lump under her left axilla, had mamamo/usg done neg as noted below.\par compliant with Femara.\par says she was diagnosed with iron deficiency last October??.\par states she was advised IV iron but has been taking po iron.\par Not taking Ca + D\par After extensive questioning pt finally admitted that she has been having vaginal spotting for several months.\par She has not seen a GYN in a long time\par \par 2/10/20\par  Patient here for follow up.  She denies any new complaints.  Patient denies any new palpable breast lumps or pain, denies skin changes, denies nipple discharge.  Patient denies cough, shortness of breath, denies fever, denies bone pain.\par compliant with letrozole.\par Still has vaginal spotting off and on.\par Did not go for USG of pelvis or GYN eval as advised previously.\par Pt states that this is not her first priority.\par Continues to smoke.\par No abdominal pain\par \par 4/13/2020: The patient is here for follow up . She remains on letrozole since July 2015. She denies fever, chills, no weight loss, no chest pain or shortness of breath . She is actively smoking. 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 She underwent conization with endometrial biopsy and vaginal exam on 3/2/2020 . 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 The patient is still reporting vaginal spotting intermittently\par \par \par 5/7/20\par Pt is here for follow up.\par She has completed MRI pelvis and was seen in Rad/Onc.\par No new symptoms\par \par 06/02/2020\par JUAN ANTONIO OSHEA a 59 year F is here today for follow up of Breast Ca and cervical cancer.\par Has been complaint with femara since 9/2015 vitamin and calcium.  Pt does not want to continue Femara while on Chemo and RT. \par Patient denies any new palpable breast lumps or pain, denies skin changes, denies nipple discharge. \par Denies vaginal bleeding, bloating, abdominal pain. Denies dysuria, fever, chills. \par She started RT today and will start weekly cisplatin today.  \par She had PET on 5/14: Focal FDG activity, proximal left lower lobe associated with approximate 8 mm branch point nodule (axial image 195, max SUV 4.2). Follow-up diagnostic CT scan 3 months time recommended. New FDG avid 8 mm left pelvic sidewall lymph node (axial 103, max SUV 9.8), suspicious for biologic tumor activity. \par CBC reviewed. \par \par 6/8/20\par Pt is here for follow up.\par She is s/p 1 weekly dose of Cisplatin which she tolerated well.\par Her only complaint is of heart burn since last week.\par In addition she has been experiencing insomnia.\par No N, V, D.\par Had mild vaginal bleeding.\par No abd pain.\par \par 6/15/20\par Pt is here for follow up.\par Is s/p 2 weekly treatments of cisplatin. Tolerating it well. No N or V.\par Had diarrhea last week which resolved spontaneously.\par No abd pain or vag bleeding.\par No fever.\par C/o insomnia, has been drinking a lot of caffeinated beverages\par \par 6/29/20\par pT is here for follow up.\par Tolerating chemo well.\par No reports of N, V. Has loose BMs occasionally well managed with Imodium.\par No vag bleeding

## 2020-07-03 NOTE — PAST MEDICAL HISTORY
[Postmenopausal] : The patient is postmenopausal [Menarche Age ____] : age at menarche was [unfilled] [Menopause Age____] : age at menopause was [unfilled] [Live Births ___] : P[unfilled]  [Total Preg ___] : G[unfilled] [Age At Live Birth ___] : Age at live birth: [unfilled] [History of Hormone Replacement Treatment] : has no history of hormone replacement treatment [FreeTextEntry5] : none [FreeTextEntry6] : none [FreeTextEntry8] : none [FreeTextEntry7] : none

## 2020-07-06 ENCOUNTER — APPOINTMENT (OUTPATIENT)
Dept: HEMATOLOGY ONCOLOGY | Facility: CLINIC | Age: 60
End: 2020-07-06
Payer: MEDICAID

## 2020-07-06 ENCOUNTER — LABORATORY RESULT (OUTPATIENT)
Age: 60
End: 2020-07-06

## 2020-07-06 VITALS
DIASTOLIC BLOOD PRESSURE: 79 MMHG | WEIGHT: 172 LBS | TEMPERATURE: 97.3 F | SYSTOLIC BLOOD PRESSURE: 112 MMHG | HEART RATE: 104 BPM | HEIGHT: 66 IN | BODY MASS INDEX: 27.64 KG/M2

## 2020-07-06 DIAGNOSIS — Z79.811 LONG TERM (CURRENT) USE OF AROMATASE INHIBITORS: ICD-10-CM

## 2020-07-06 DIAGNOSIS — Z51.11 ENCOUNTER FOR ANTINEOPLASTIC CHEMOTHERAPY: ICD-10-CM

## 2020-07-06 LAB
HCT VFR BLD CALC: 35 %
HGB BLD-MCNC: 11.5 G/DL
MCHC RBC-ENTMCNC: 28.3 PG
MCHC RBC-ENTMCNC: 32.9 G/DL
MCV RBC AUTO: 86 FL
PLATELET # BLD AUTO: 233 K/UL
PMV BLD: 9.7 FL
RBC # BLD: 4.07 M/UL
RBC # FLD: 14.4 %
WBC # FLD AUTO: 2.51 K/UL

## 2020-07-06 PROCEDURE — 77014: CPT | Mod: 26

## 2020-07-06 PROCEDURE — 99215 OFFICE O/P EST HI 40 MIN: CPT

## 2020-07-06 NOTE — ASSESSMENT
[FreeTextEntry1] : 59 yr old postmenopausal female with microinvasive hormone receptor positive breast cancer on adjuvant Femara since Sept 2015. \par New diagnosis of early stage cervical cancer ( Stage IIA1) s/p conization on 3/2/2020\par \par PLAN\par -- For her DCIS: Continue Femara, Ca + Vit D - for a total of 5 yrs (September 2020) \par The side effects from such treatment were discussed in detail including but not limited to hot flashes, weight gain, hair thinning, arthralgia, myalgia, bone loss, increased risk of osteoporotic fractures and thrombo-embolism.\par She will take Ca + VIt D daily while on AI.\par Her compliance and any side effects from such treatment were assessed at today's visit\par Advised to stop smoking.\par DEXA scan to be repeated in July 2020.\par She needs left breast US to evaluate left breast hypodensity seen on previous mammo and US from June 2019. \par PT IS VERY NON COMPLIANT, asking to hold US breast for now , so she can deal with her new diagnosis of cervical cancer.\par \par -- New diagnosis of stage IIA1 poorly to moderately differentiated cervical squamous cell carcinoma, Per MRI results the cancer is more extensive than initially thought. Last PET done 5/2020\par Pt is s/p 5 doses of Cisplatin which she tolerated well.\par Cisplatin will be administered at a dose of 40 mg/m2 on days 1, 8, 15, 22, 29, and 36 with RT.\par Proceed with day 36 of Cisplatin, counts are adequate\par Adequate precautions with antiemetics and IV hydration will will be undertaken.\par Pt was advised to maintain adequate oral hydration with fluids and electrolytes.\par Side effects incl but not limited to nausea, vomiting, myelosuppression, nephrotoxicity, risk of sepsis, electrolyte imbalances, neuropathy, hearing loss etc were discussed.\par \par The chemotherapy dose was adjusted according to pt's height, weight, labs and anticipated tolerance.\par The high risk of complications and complexity associated with antineoplastic therapy administration has been explained to the pt and family.\par Chemotherapy will be administered under my direct supervision\par \par Continue  Prilosec. Pt advised about treatment of chemo/Rad induced diarrhea.\par Advised dietary modification and use of imodium.\par \par We will coordinate care with Rad/Onc. \par \par RTC in 3 weeks\par \par

## 2020-07-06 NOTE — HISTORY OF PRESENT ILLNESS
[Disease: _____________________] : Disease: [unfilled] [N: ___] : N[unfilled] [T: ___] : T[unfilled] [AJCC Stage: ____] : AJCC Stage: [unfilled] [M: ___] : M[unfilled] [de-identified] : IDC [de-identified] : < 1mm (microinvasive)\par UOQ [de-identified] : Patient is a 57 year old year-old postmenopausal female transferring her care to me. \par \par She has history of ER and HI positive 0.6 mm well, differentiated, microinvasive ductal carcinoma  ER/HI (+), HER2 (-). status post surgery currently on adjuvant Femara started in July 2015. \par She is s/p Right NLOC/SNB/RF Spect/XOFT 06/15/15 - 0/1 (-); DCIS intermed grade, no residual IDC; (-) margins\par Patient is taking calcium and vitamin D .\par She is refusing Pap smears.\par No FHx breast/ovarian cancer.\par  [de-identified] : ER and ME positive, and her-2 negative [de-identified] : She has not been here since 11/18/16. However she states she has been compliant with Femara.\par She does not exercise.\par Her appetite is good.\par She denies any adverse events.\par \par 7/30/18:\par Doing well. Mammo in July 2018 was normal. DEXA in July 2018 showed osteopenia. \par C/o burning and pain while passing urine. Started few days ago, resolved by itself and recurred last night. Did not take any Abx for UTI. \par Compliant with Letrozole. Denies fever, nausea, vomiting, chest pain, SOB, abdominal pain, bowel problems.\par Colonoscopy done in 2017 and it was normal. \par \par 01/28/2019\par Patient is here for a follow up visit. \par C/O of weight gain and increased appetite. But otherwise tolerating letrozole well. \par Mammogram due in  july 2019. \par Denies fever, nausea, vomiting, chest pain, SOB, abdominal pain, bowel problems.\par Colonoscopy done in 2017 and it was normal. \par \par 7/8/19\par pt is here for follow up.\par Denies fever, nausea, vomiting, chest pain, SOB, abdominal pain, bowel problems.\par she had felt a lump under her left axilla, had mamamo/usg done neg as noted below.\par compliant with Femara.\par says she was diagnosed with iron deficiency last October??.\par states she was advised IV iron but has been taking po iron.\par Not taking Ca + D\par After extensive questioning pt finally admitted that she has been having vaginal spotting for several months.\par She has not seen a GYN in a long time\par \par 2/10/20\par  Patient here for follow up.  She denies any new complaints.  Patient denies any new palpable breast lumps or pain, denies skin changes, denies nipple discharge.  Patient denies cough, shortness of breath, denies fever, denies bone pain.\par compliant with letrozole.\par Still has vaginal spotting off and on.\par Did not go for USG of pelvis or GYN eval as advised previously.\par Pt states that this is not her first priority.\par Continues to smoke.\par No abdominal pain\par \par 4/13/2020: The patient is here for follow up . She remains on letrozole since July 2015. She denies fever, chills, no weight loss, no chest pain or shortness of breath . She is actively smoking. 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 She underwent conization with endometrial biopsy and vaginal exam on 3/2/2020 . 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 The patient is still reporting vaginal spotting intermittently\par \par \par 5/7/20\par Pt is here for follow up.\par She has completed MRI pelvis and was seen in Rad/Onc.\par No new symptoms\par \par 06/02/2020\par JUAN ANTONIO OSHEA a 59 year F is here today for follow up of Breast Ca and cervical cancer.\par Has been complaint with femara since 9/2015 vitamin and calcium.  Pt does not want to continue Femara while on Chemo and RT. \par Patient denies any new palpable breast lumps or pain, denies skin changes, denies nipple discharge. \par Denies vaginal bleeding, bloating, abdominal pain. Denies dysuria, fever, chills. \par She started RT today and will start weekly cisplatin today.  \par She had PET on 5/14: Focal FDG activity, proximal left lower lobe associated with approximate 8 mm branch point nodule (axial image 195, max SUV 4.2). Follow-up diagnostic CT scan 3 months time recommended. New FDG avid 8 mm left pelvic sidewall lymph node (axial 103, max SUV 9.8), suspicious for biologic tumor activity. \par CBC reviewed. \par \par 6/8/20\par Pt is here for follow up.\par She is s/p 1 weekly dose of Cisplatin which she tolerated well.\par Her only complaint is of heart burn since last week.\par In addition she has been experiencing insomnia.\par No N, V, D.\par Had mild vaginal bleeding.\par No abd pain.\par \par 6/15/20\par Pt is here for follow up.\par Is s/p 2 weekly treatments of cisplatin. Tolerating it well. No N or V.\par Had diarrhea last week which resolved spontaneously.\par No abd pain or vag bleeding.\par No fever.\par C/o insomnia, has been drinking a lot of caffeinated beverages\par \par 6/29/20\par pT is here for follow up.\par Tolerating chemo well.\par No reports of N, V. Has loose BMs occasionally well managed with Imodium.\par No vag bleeding\par \par 7/6/20\par Pt is here for follow up.\par No new complaints. Will be finishing RT tomorrow.\par No vag bleeding or pelvic pain.\par No fever, N, V, D\par compliant with femara

## 2020-07-06 NOTE — PAST MEDICAL HISTORY
[Postmenopausal] : The patient is postmenopausal [Menopause Age____] : age at menopause was [unfilled] [Menarche Age ____] : age at menarche was [unfilled] [Total Preg ___] : G[unfilled] [Live Births ___] : P[unfilled]  [Age At Live Birth ___] : Age at live birth: [unfilled] [History of Hormone Replacement Treatment] : has no history of hormone replacement treatment [FreeTextEntry6] : none [FreeTextEntry7] : none [FreeTextEntry5] : none [FreeTextEntry8] : none

## 2020-07-06 NOTE — CONSULT LETTER
[Dear  ___] : Dear  [unfilled], [Consult Letter:] : I had the pleasure of evaluating your patient, [unfilled]. [Please see my note below.] : Please see my note below. [Consult Closing:] : Thank you very much for allowing me to participate in the care of this patient.  If you have any questions, please do not hesitate to contact me. [Sincerely,] : Sincerely, [FreeTextEntry3] : Palmer Hall MD\par Professor [employer]\par Viv[employer]Di\par Attending Physician\par  [facility]\par [phone]\par \par

## 2020-07-07 ENCOUNTER — APPOINTMENT (OUTPATIENT)
Dept: INFUSION THERAPY | Facility: CLINIC | Age: 60
End: 2020-07-07

## 2020-07-07 VITALS
SYSTOLIC BLOOD PRESSURE: 127 MMHG | WEIGHT: 173.38 LBS | DIASTOLIC BLOOD PRESSURE: 82 MMHG | BODY MASS INDEX: 27.98 KG/M2 | HEART RATE: 96 BPM | RESPIRATION RATE: 14 BRPM | TEMPERATURE: 97.7 F

## 2020-07-07 LAB
ANION GAP SERPL CALC-SCNC: 16 MMOL/L
BUN SERPL-MCNC: 14 MG/DL
CALCIUM SERPL-MCNC: 9.4 MG/DL
CHLORIDE SERPL-SCNC: 99 MMOL/L
CO2 SERPL-SCNC: 26 MMOL/L
CREAT SERPL-MCNC: 0.9 MG/DL
GLUCOSE SERPL-MCNC: 83 MG/DL
MAGNESIUM SERPL-MCNC: 1.7 MG/DL
POTASSIUM SERPL-SCNC: 3.7 MMOL/L
SODIUM SERPL-SCNC: 141 MMOL/L

## 2020-07-07 RX ORDER — DEXAMETHASONE 0.5 MG/5ML
12 ELIXIR ORAL ONCE
Refills: 0 | Status: COMPLETED | OUTPATIENT
Start: 2020-07-07 | End: 2020-07-07

## 2020-07-07 RX ORDER — CISPLATIN 1 MG/ML
76 INJECTION, SOLUTION INTRAVENOUS ONCE
Refills: 0 | Status: COMPLETED | OUTPATIENT
Start: 2020-07-07 | End: 2020-07-07

## 2020-07-07 RX ORDER — FOSAPREPITANT DIMEGLUMINE 150 MG/5ML
150 INJECTION, POWDER, LYOPHILIZED, FOR SOLUTION INTRAVENOUS ONCE
Refills: 0 | Status: COMPLETED | OUTPATIENT
Start: 2020-07-07 | End: 2020-07-07

## 2020-07-07 RX ORDER — SODIUM CHLORIDE 9 MG/ML
1000 INJECTION INTRAMUSCULAR; INTRAVENOUS; SUBCUTANEOUS
Refills: 0 | Status: DISCONTINUED | OUTPATIENT
Start: 2020-07-07 | End: 2021-01-17

## 2020-07-07 RX ORDER — FUROSEMIDE 40 MG
40 TABLET ORAL ONCE
Refills: 0 | Status: COMPLETED | OUTPATIENT
Start: 2020-07-07 | End: 2020-07-07

## 2020-07-07 RX ORDER — MAGNESIUM SULFATE 500 MG/ML
2 VIAL (ML) INJECTION ONCE
Refills: 0 | Status: COMPLETED | OUTPATIENT
Start: 2020-07-07 | End: 2020-07-07

## 2020-07-07 RX ADMIN — SODIUM CHLORIDE 1000 MILLILITER(S): 9 INJECTION INTRAMUSCULAR; INTRAVENOUS; SUBCUTANEOUS at 14:46

## 2020-07-07 RX ADMIN — SODIUM CHLORIDE 1000 MILLILITER(S): 9 INJECTION INTRAMUSCULAR; INTRAVENOUS; SUBCUTANEOUS at 12:50

## 2020-07-07 RX ADMIN — CISPLATIN 76 MILLIGRAM(S): 1 INJECTION, SOLUTION INTRAVENOUS at 13:49

## 2020-07-07 RX ADMIN — Medication 12 MILLIGRAM(S): at 12:20

## 2020-07-07 RX ADMIN — SODIUM CHLORIDE 500 MILLILITER(S): 9 INJECTION INTRAMUSCULAR; INTRAVENOUS; SUBCUTANEOUS at 10:55

## 2020-07-07 RX ADMIN — FOSAPREPITANT DIMEGLUMINE 150 MILLIGRAM(S): 150 INJECTION, POWDER, LYOPHILIZED, FOR SOLUTION INTRAVENOUS at 12:40

## 2020-07-07 RX ADMIN — FOSAPREPITANT DIMEGLUMINE 300 MILLIGRAM(S): 150 INJECTION, POWDER, LYOPHILIZED, FOR SOLUTION INTRAVENOUS at 12:20

## 2020-07-07 RX ADMIN — Medication 122 MILLIGRAM(S): at 12:05

## 2020-07-07 RX ADMIN — Medication 40 MILLIGRAM(S): at 11:00

## 2020-07-07 RX ADMIN — Medication 50 GRAM(S): at 11:00

## 2020-07-07 RX ADMIN — CISPLATIN 434.67 MILLIGRAM(S): 1 INJECTION, SOLUTION INTRAVENOUS at 12:49

## 2020-07-07 RX ADMIN — Medication 2 GRAM(S): at 12:00

## 2020-07-07 RX ADMIN — SODIUM CHLORIDE 500 MILLILITER(S): 9 INJECTION INTRAMUSCULAR; INTRAVENOUS; SUBCUTANEOUS at 14:00

## 2020-07-07 NOTE — DISEASE MANAGEMENT
[Clinical] : TNM Stage: c [IB] : IB [TTNM] : 1b [NTNM] : 0 [de-identified] : Cervix [MTNM] : 0 [de-identified] : 0990cGy [de-identified] : 6190cGy

## 2020-07-07 NOTE — DISEASE MANAGEMENT
[Clinical] : TNM Stage: c [IB] : IB [NTNM] : 0 [TTNM] : 1b [de-identified] : 6788cYo [de-identified] : 2700cGy [MTNM] : 0 [de-identified] : Cervix

## 2020-07-07 NOTE — PHYSICAL EXAM
[Normal] : normoactive bowel sounds, soft and nontender, no hepatosplenomegaly or masses appreciated [de-identified] : Next pelvic exam will be in the OR.

## 2020-07-07 NOTE — PHYSICAL EXAM
[de-identified] : Next pelvic will be in the OR [Normal] : normal heart rate and rhythm, normal S1 and S2, and no murmurs present

## 2020-07-07 NOTE — HISTORY OF PRESENT ILLNESS
[Home] : at home, [unfilled] , at the time of the visit. [Medical Office: ([facility])___] : at the medical office located in  [FreeTextEntry1] : 7/7/20 OTV: 4500cGy/4500cGy to the cervix. pt seen today in no distress. c/o fatigue. denies any recent events of N/V/D. kept under control by pt medication and diet. denies any pain. \par \par 6/25/20 OTV: 3240cGy/4500cGy to the cervix. pt c/o fatigue. heartburn and diarrhea under control. denies any nausea, vomit, chest pain, SOB, cough. Exam done by . \par \par 6/16/20 OTV: 1980cGy/4800cGy to the cervix. pt seen today in no distress. pt experiencing fatigue. pt started taking Imodium for diarrhea and experiencing relief. pt has pain r/t to arthritis in her back. denies any chest pain, SOB, cough.  \par \par 6/9/2020 OTV: 1080cGy/4500cGy to the cervix. c/o pain related to arthritis to her back. states she has been feeling increased fatigue since last week. pt denies any N/V/D, chest pain, SOB. educated on skin care and diet. verbalized understanding. \par \par 6/2/20 OTV: 180cGy/4500cGy to the cervix/pelvis. pt seen today with no complaints. pt denies any N/V/D. denies cough, SOB, chest pain. pt is on concurrent chemo and has first course today. pt takes oxy for arthritis pain.

## 2020-07-07 NOTE — HISTORY OF PRESENT ILLNESS
[Home] : at home, [unfilled] , at the time of the visit. [Medical Office: ([facility])___] : at the medical office located in  [FreeTextEntry1] : 7/2/2020 OTV: 4140/[id number] to the cervis.  pt reports doing well.  Will experience some nausea but resolves with Zofran.  She has no complaints.  Will complete her chemotherapy next week.  Plan for T & R HDR treatment mid-later July.  Pelvic skin looks good, no erythema.  All questions answered.  \par  \par 6/25/20 OTV: 3240cGy/4500cGy to the cervix. pt c/o fatigue. heartburn and diarrhea under control. denies any nausea, vomit, chest pain, SOB, cough. Exam done by . \par \par 6/16/20 OTV: 1980cGy/4800cGy to the cervix. pt seen today in no distress. pt experiencing fatigue. pt started taking Imodium for diarrhea and experiencing relief. pt has pain r/t to arthritis in her back. denies any chest pain, SOB, cough.  \par \par 6/9/2020 OTV: 1080cGy/4500cGy to the cervix. c/o pain related to arthritis to her back. states she has been feeling increased fatigue since last week. pt denies any N/V/D, chest pain, SOB. educated on skin care and diet. verbalized understanding. \par \par 6/2/20 OTV: 180cGy/4500cGy to the cervix/pelvis. pt seen today with no complaints. pt denies any N/V/D. denies cough, SOB, chest pain. pt is on concurrent chemo and has first course today. pt takes oxy for arthritis pain.

## 2020-07-13 ENCOUNTER — APPOINTMENT (OUTPATIENT)
Dept: HEMATOLOGY ONCOLOGY | Facility: CLINIC | Age: 60
End: 2020-07-13
Payer: MEDICAID

## 2020-07-13 VITALS
HEART RATE: 127 BPM | WEIGHT: 170 LBS | HEIGHT: 66 IN | SYSTOLIC BLOOD PRESSURE: 121 MMHG | TEMPERATURE: 98.3 F | DIASTOLIC BLOOD PRESSURE: 84 MMHG | BODY MASS INDEX: 27.32 KG/M2

## 2020-07-13 PROCEDURE — 99213 OFFICE O/P EST LOW 20 MIN: CPT

## 2020-07-13 NOTE — PAST MEDICAL HISTORY
[Postmenopausal] : The patient is postmenopausal [Menarche Age ____] : age at menarche was [unfilled] [Menopause Age____] : age at menopause was [unfilled] [Total Preg ___] : G[unfilled] [Live Births ___] : P[unfilled]  [Age At Live Birth ___] : Age at live birth: [unfilled] [History of Hormone Replacement Treatment] : has no history of hormone replacement treatment [FreeTextEntry6] : none [FreeTextEntry5] : none [FreeTextEntry7] : none [FreeTextEntry8] : none

## 2020-07-13 NOTE — PHYSICAL EXAM
[Fully active, able to carry on all pre-disease performance without restriction] : Status 0 - Fully active, able to carry on all pre-disease performance without restriction [Normal] : affect appropriate [de-identified] : rt side lumpectomy scar noted. No palpable masses/ axillary LNs

## 2020-07-13 NOTE — HISTORY OF PRESENT ILLNESS
[Disease: _____________________] : Disease: [unfilled] [T: ___] : T[unfilled] [N: ___] : N[unfilled] [M: ___] : M[unfilled] [AJCC Stage: ____] : AJCC Stage: [unfilled] [de-identified] : IDC [de-identified] : Patient is a 57 year old year-old postmenopausal female transferring her care to me. \par \par She has history of ER and NM positive 0.6 mm well, differentiated, microinvasive ductal carcinoma  ER/NM (+), HER2 (-). status post surgery currently on adjuvant Femara started in July 2015. \par She is s/p Right NLOC/SNB/RF Spect/XOFT 06/15/15 - 0/1 (-); DCIS intermed grade, no residual IDC; (-) margins\par Patient is taking calcium and vitamin D .\par She is refusing Pap smears.\par No FHx breast/ovarian cancer.\par  [de-identified] : ER and CO positive, and her-2 negative [de-identified] : < 1mm (microinvasive)\par UOQ [de-identified] : She has not been here since 11/18/16. However she states she has been compliant with Femara.\par She does not exercise.\par Her appetite is good.\par She denies any adverse events.\par \par 7/30/18:\par Doing well. Mammo in July 2018 was normal. DEXA in July 2018 showed osteopenia. \par C/o burning and pain while passing urine. Started few days ago, resolved by itself and recurred last night. Did not take any Abx for UTI. \par Compliant with Letrozole. Denies fever, nausea, vomiting, chest pain, SOB, abdominal pain, bowel problems.\par Colonoscopy done in 2017 and it was normal. \par \par 01/28/2019\par Patient is here for a follow up visit. \par C/O of weight gain and increased appetite. But otherwise tolerating letrozole well. \par Mammogram due in  july 2019. \par Denies fever, nausea, vomiting, chest pain, SOB, abdominal pain, bowel problems.\par Colonoscopy done in 2017 and it was normal. \par \par 7/8/19\par pt is here for follow up.\par Denies fever, nausea, vomiting, chest pain, SOB, abdominal pain, bowel problems.\par she had felt a lump under her left axilla, had mamamo/usg done neg as noted below.\par compliant with Femara.\par says she was diagnosed with iron deficiency last October??.\par states she was advised IV iron but has been taking po iron.\par Not taking Ca + D\par After extensive questioning pt finally admitted that she has been having vaginal spotting for several months.\par She has not seen a GYN in a long time\par \par 2/10/20\par  Patient here for follow up.  She denies any new complaints.  Patient denies any new palpable breast lumps or pain, denies skin changes, denies nipple discharge.  Patient denies cough, shortness of breath, denies fever, denies bone pain.\par compliant with letrozole.\par Still has vaginal spotting off and on.\par Did not go for USG of pelvis or GYN eval as advised previously.\par Pt states that this is not her first priority.\par Continues to smoke.\par No abdominal pain\par \par 4/13/2020: The patient is here for follow up . She remains on letrozole since July 2015. She denies fever, chills, no weight loss, no chest pain or shortness of breath . She is actively smoking. 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 She underwent conization with endometrial biopsy and vaginal exam on 3/2/2020 . 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 The patient is still reporting vaginal spotting intermittently\par \par \par 5/7/20\par Pt is here for follow up.\par She has completed MRI pelvis and was seen in Rad/Onc.\par No new symptoms\par \par 06/02/2020\par JUAN ANTONIO OSHEA a 59 year F is here today for follow up of Breast Ca and cervical cancer.\par Has been complaint with femara since 9/2015 vitamin and calcium.  Pt does not want to continue Femara while on Chemo and RT. \par Patient denies any new palpable breast lumps or pain, denies skin changes, denies nipple discharge. \par Denies vaginal bleeding, bloating, abdominal pain. Denies dysuria, fever, chills. \par She started RT today and will start weekly cisplatin today.  \par She had PET on 5/14: Focal FDG activity, proximal left lower lobe associated with approximate 8 mm branch point nodule (axial image 195, max SUV 4.2). Follow-up diagnostic CT scan 3 months time recommended. New FDG avid 8 mm left pelvic sidewall lymph node (axial 103, max SUV 9.8), suspicious for biologic tumor activity. \par CBC reviewed. \par \par 6/8/20\par Pt is here for follow up.\par She is s/p 1 weekly dose of Cisplatin which she tolerated well.\par Her only complaint is of heart burn since last week.\par In addition she has been experiencing insomnia.\par No N, V, D.\par Had mild vaginal bleeding.\par No abd pain.\par \par 6/15/20\par Pt is here for follow up.\par Is s/p 2 weekly treatments of cisplatin. Tolerating it well. No N or V.\par Had diarrhea last week which resolved spontaneously.\par No abd pain or vag bleeding.\par No fever.\par C/o insomnia, has been drinking a lot of caffeinated beverages\par \par 6/29/20\par pT is here for follow up.\par Tolerating chemo well.\par No reports of N, V. Has loose BMs occasionally well managed with Imodium.\par No vag bleeding\par \par 7/6/20\par Pt is here for follow up.\par No new complaints. Will be finishing RT tomorrow.\par No vag bleeding or pelvic pain.\par No fever, N, V, D\par compliant with femara\par \par 7/13/20- Juan Antonio is here for for follow up. She remains on femara, Ca and Vitamin D,. she still has not got the US breast yet. She continues to smoke. She completed radiation EBRT and is planned for brachytherapy. Her appetite is good. With the last treatment she started having diarrhea, nausea which is better when she takes prn meds. Pepcid helping with reflux\par No vaginal bleeding.

## 2020-07-13 NOTE — ASSESSMENT
[FreeTextEntry1] : 59 yr old postmenopausal female with microinvasive hormone receptor positive breast cancer on adjuvant Femara since Sept 2015. \par New diagnosis of early stage cervical cancer ( Stage IIA1) s/p conization on 3/2/2020\par \par 1. DCIS breast : Continue Femara, Ca + Vit D - for a total of 5 yrs (September 2020) \par The side effects from such treatment were discussed in detail including but not limited to hot flashes, weight gain, hair thinning, arthralgia, myalgia, bone loss, increased risk of osteoporotic fractures and thrombo-embolism.\par She will take Ca + VIt D daily while on AI.\par Her compliance and any side effects from such treatment were assessed at today's visit\par She continues to smoke . Advised to stop smoking\par DEXA scan to be repeated in July 2020- Script provided \par Script provided again for mammogram and US breast. She is non compliant with this \par \par \par 2. New diagnosis of stage IIA1 poorly to moderately differentiated cervical squamous cell carcinoma, Per MRI results the cancer is more extensive than initially thought. Last PET done 5/2020\par She completed CHemoRT and is now scheduled to get brachytherapy with Dr De Souza \par Will repeat PET scan in 6-8 weeks to assess for response \par Will do lab work- CBC, CMP and Magnesium \par She takes prn imodium and anti nausea meds and prilosec to GERD\par \par RTC in 4 weeks and we will give her a script for PET scan at that time \par \par Plan discussed with Dr Hall\par \par

## 2020-07-13 NOTE — REVIEW OF SYSTEMS
[Fatigue] : fatigue [Vomiting] : vomiting [Diarrhea] : diarrhea [Negative] : Genitourinary [Dysuria] : no dysuria [FreeTextEntry7] : nausea

## 2020-07-13 NOTE — CONSULT LETTER
[Dear  ___] : Dear  [unfilled], [Consult Letter:] : I had the pleasure of evaluating your patient, [unfilled]. [Please see my note below.] : Please see my note below. [Consult Closing:] : Thank you very much for allowing me to participate in the care of this patient.  If you have any questions, please do not hesitate to contact me. [Sincerely,] : Sincerely, [FreeTextEntry3] : Palmer Hall MD\par Professor [employer]\par Viv/Di\par Attending Physician\par [facility]\par [phone]\par \par

## 2020-07-14 ENCOUNTER — OUTPATIENT (OUTPATIENT)
Dept: OUTPATIENT SERVICES | Facility: HOSPITAL | Age: 60
LOS: 1 days | Discharge: HOME | End: 2020-07-14

## 2020-07-14 VITALS
SYSTOLIC BLOOD PRESSURE: 130 MMHG | TEMPERATURE: 98 F | HEART RATE: 94 BPM | DIASTOLIC BLOOD PRESSURE: 78 MMHG | WEIGHT: 171.96 LBS | HEIGHT: 66 IN | OXYGEN SATURATION: 97 % | RESPIRATION RATE: 16 BRPM

## 2020-07-14 DIAGNOSIS — C53.0 MALIGNANT NEOPLASM OF ENDOCERVIX: ICD-10-CM

## 2020-07-14 DIAGNOSIS — Z01.818 ENCOUNTER FOR OTHER PREPROCEDURAL EXAMINATION: ICD-10-CM

## 2020-07-14 DIAGNOSIS — Z90.49 ACQUIRED ABSENCE OF OTHER SPECIFIED PARTS OF DIGESTIVE TRACT: Chronic | ICD-10-CM

## 2020-07-14 DIAGNOSIS — Z90.89 ACQUIRED ABSENCE OF OTHER ORGANS: Chronic | ICD-10-CM

## 2020-07-14 DIAGNOSIS — Z98.890 OTHER SPECIFIED POSTPROCEDURAL STATES: Chronic | ICD-10-CM

## 2020-07-14 LAB
ALBUMIN SERPL ELPH-MCNC: 4.6 G/DL — SIGNIFICANT CHANGE UP (ref 3.5–5.2)
ALP SERPL-CCNC: 104 U/L — SIGNIFICANT CHANGE UP (ref 30–115)
ALT FLD-CCNC: 21 U/L — SIGNIFICANT CHANGE UP (ref 0–41)
ANION GAP SERPL CALC-SCNC: 13 MMOL/L — SIGNIFICANT CHANGE UP (ref 7–14)
APPEARANCE UR: CLEAR — SIGNIFICANT CHANGE UP
APTT BLD: 24.3 SEC — LOW (ref 27–39.2)
AST SERPL-CCNC: 21 U/L — SIGNIFICANT CHANGE UP (ref 0–41)
BACTERIA # UR AUTO: NEGATIVE — SIGNIFICANT CHANGE UP
BASOPHILS # BLD AUTO: 0.01 K/UL — SIGNIFICANT CHANGE UP (ref 0–0.2)
BASOPHILS NFR BLD AUTO: 0.6 % — SIGNIFICANT CHANGE UP (ref 0–1)
BILIRUB SERPL-MCNC: <0.2 MG/DL — SIGNIFICANT CHANGE UP (ref 0.2–1.2)
BILIRUB UR-MCNC: NEGATIVE — SIGNIFICANT CHANGE UP
BUN SERPL-MCNC: 10 MG/DL — SIGNIFICANT CHANGE UP (ref 10–20)
CALCIUM SERPL-MCNC: 9.2 MG/DL — SIGNIFICANT CHANGE UP (ref 8.5–10.1)
CHLORIDE SERPL-SCNC: 97 MMOL/L — LOW (ref 98–110)
CO2 SERPL-SCNC: 29 MMOL/L — SIGNIFICANT CHANGE UP (ref 17–32)
COLOR SPEC: YELLOW — SIGNIFICANT CHANGE UP
CREAT SERPL-MCNC: 0.9 MG/DL — SIGNIFICANT CHANGE UP (ref 0.7–1.5)
DIFF PNL FLD: NEGATIVE — SIGNIFICANT CHANGE UP
EOSINOPHIL # BLD AUTO: 0.01 K/UL — SIGNIFICANT CHANGE UP (ref 0–0.7)
EOSINOPHIL NFR BLD AUTO: 0.6 % — SIGNIFICANT CHANGE UP (ref 0–8)
EPI CELLS # UR: 3 /HPF — SIGNIFICANT CHANGE UP (ref 0–5)
GLUCOSE SERPL-MCNC: 106 MG/DL — HIGH (ref 70–99)
GLUCOSE UR QL: NEGATIVE — SIGNIFICANT CHANGE UP
HCT VFR BLD CALC: 34.2 % — LOW (ref 37–47)
HGB BLD-MCNC: 11.2 G/DL — LOW (ref 12–16)
HYALINE CASTS # UR AUTO: 3 /LPF — SIGNIFICANT CHANGE UP (ref 0–7)
IMM GRANULOCYTES NFR BLD AUTO: 0.6 % — HIGH (ref 0.1–0.3)
INR BLD: 0.87 RATIO — SIGNIFICANT CHANGE UP (ref 0.65–1.3)
KETONES UR-MCNC: NEGATIVE — SIGNIFICANT CHANGE UP
LEUKOCYTE ESTERASE UR-ACNC: ABNORMAL
LYMPHOCYTES # BLD AUTO: 0.64 K/UL — LOW (ref 1.2–3.4)
LYMPHOCYTES # BLD AUTO: 41.3 % — SIGNIFICANT CHANGE UP (ref 20.5–51.1)
MCHC RBC-ENTMCNC: 28.5 PG — SIGNIFICANT CHANGE UP (ref 27–31)
MCHC RBC-ENTMCNC: 32.7 G/DL — SIGNIFICANT CHANGE UP (ref 32–37)
MCV RBC AUTO: 87 FL — SIGNIFICANT CHANGE UP (ref 81–99)
MONOCYTES # BLD AUTO: 0.11 K/UL — SIGNIFICANT CHANGE UP (ref 0.1–0.6)
MONOCYTES NFR BLD AUTO: 7.1 % — SIGNIFICANT CHANGE UP (ref 1.7–9.3)
NEUTROPHILS # BLD AUTO: 0.77 K/UL — LOW (ref 1.4–6.5)
NEUTROPHILS NFR BLD AUTO: 49.8 % — SIGNIFICANT CHANGE UP (ref 42.2–75.2)
NITRITE UR-MCNC: NEGATIVE — SIGNIFICANT CHANGE UP
NRBC # BLD: 0 /100 WBCS — SIGNIFICANT CHANGE UP (ref 0–0)
PH UR: 6 — SIGNIFICANT CHANGE UP (ref 5–8)
PLATELET # BLD AUTO: 193 K/UL — SIGNIFICANT CHANGE UP (ref 130–400)
POTASSIUM SERPL-MCNC: 3.7 MMOL/L — SIGNIFICANT CHANGE UP (ref 3.5–5)
POTASSIUM SERPL-SCNC: 3.7 MMOL/L — SIGNIFICANT CHANGE UP (ref 3.5–5)
PROT SERPL-MCNC: 7 G/DL — SIGNIFICANT CHANGE UP (ref 6–8)
PROT UR-MCNC: ABNORMAL
PROTHROM AB SERPL-ACNC: 10 SEC — SIGNIFICANT CHANGE UP (ref 9.95–12.87)
RBC # BLD: 3.93 M/UL — LOW (ref 4.2–5.4)
RBC # FLD: 15 % — HIGH (ref 11.5–14.5)
RBC CASTS # UR COMP ASSIST: 5 /HPF — HIGH (ref 0–4)
SODIUM SERPL-SCNC: 139 MMOL/L — SIGNIFICANT CHANGE UP (ref 135–146)
SP GR SPEC: 1.01 — SIGNIFICANT CHANGE UP (ref 1.01–1.02)
UROBILINOGEN FLD QL: SIGNIFICANT CHANGE UP
WBC # BLD: 1.55 K/UL — LOW (ref 4.8–10.8)
WBC # FLD AUTO: 1.55 K/UL — LOW (ref 4.8–10.8)
WBC UR QL: 4 /HPF — SIGNIFICANT CHANGE UP (ref 0–5)

## 2020-07-14 NOTE — H&P PST ADULT - NSICDXPASTMEDICALHX_GEN_ALL_CORE_FT
PAST MEDICAL HISTORY:  Asthma     Breast cancer right- s/p radiation and lumpectomy    Cervical cancer chemo last 7/7/2020, RAD RX    COPD (chronic obstructive pulmonary disease) USES VENTOLIN DAILY    Eczema     OA (osteoarthritis)     Seasonal allergies

## 2020-07-14 NOTE — H&P PST ADULT - HISTORY OF PRESENT ILLNESS
59yr old female states was diagnosed with cervical cancer recently- presents today to retesting for insertion of smith sleeve tandem and ring for high dose rate brachy RX. Will have 4 total cycles- consecutive weeks. Denies COVID s/s. denies sick contacts. Exercise carl 12- blocks LTD by fatigue and pain in the LEs. 59yr old female states was diagnosed with cervical cancer recently- presents today to pretesting for insertion of smith sleeve tandem and ring for high dose rate brachy RX. Will have 4 total cycles- consecutive weeks. Denies COVID s/s. denies sick contacts. Exercise carl 12- blocks LTD by fatigue and pain in the LEs. Was advised will need COVID prior to each cycle. Verbalizes understanding.

## 2020-07-14 NOTE — H&P PST ADULT - NSICDXPASTSURGICALHX_GEN_ALL_CORE_FT
PAST SURGICAL HISTORY:  History of tonsillectomy     S/P cholecystectomy     S/P lumpectomy, right breast local rad rx PAST SURGICAL HISTORY:  History of D&C cone biopsy- 3/2020    History of tonsillectomy     S/P cholecystectomy     S/P lumpectomy, right breast local rad rx

## 2020-07-17 ENCOUNTER — LABORATORY RESULT (OUTPATIENT)
Age: 60
End: 2020-07-17

## 2020-07-17 ENCOUNTER — OUTPATIENT (OUTPATIENT)
Dept: OUTPATIENT SERVICES | Facility: HOSPITAL | Age: 60
LOS: 1 days | Discharge: HOME | End: 2020-07-17

## 2020-07-17 DIAGNOSIS — Z90.49 ACQUIRED ABSENCE OF OTHER SPECIFIED PARTS OF DIGESTIVE TRACT: Chronic | ICD-10-CM

## 2020-07-17 DIAGNOSIS — Z90.89 ACQUIRED ABSENCE OF OTHER ORGANS: Chronic | ICD-10-CM

## 2020-07-17 DIAGNOSIS — Z98.890 OTHER SPECIFIED POSTPROCEDURAL STATES: Chronic | ICD-10-CM

## 2020-07-17 DIAGNOSIS — Z11.59 ENCOUNTER FOR SCREENING FOR OTHER VIRAL DISEASES: ICD-10-CM

## 2020-07-17 NOTE — ASU PATIENT PROFILE, ADULT - PSH
History of D&C  cone biopsy- 3/2020  History of tonsillectomy    S/P cholecystectomy    S/P lumpectomy, right breast  local rad rx

## 2020-07-18 PROBLEM — J44.9 CHRONIC OBSTRUCTIVE PULMONARY DISEASE, UNSPECIFIED: Chronic | Status: ACTIVE | Noted: 2020-02-24

## 2020-07-18 PROBLEM — L30.9 DERMATITIS, UNSPECIFIED: Chronic | Status: ACTIVE | Noted: 2020-07-14

## 2020-07-18 PROBLEM — J45.909 UNSPECIFIED ASTHMA, UNCOMPLICATED: Chronic | Status: ACTIVE | Noted: 2020-02-24

## 2020-07-18 PROBLEM — C53.9 MALIGNANT NEOPLASM OF CERVIX UTERI, UNSPECIFIED: Chronic | Status: ACTIVE | Noted: 2020-07-14

## 2020-07-20 ENCOUNTER — OUTPATIENT (OUTPATIENT)
Dept: OUTPATIENT SERVICES | Facility: HOSPITAL | Age: 60
LOS: 1 days | Discharge: HOME | End: 2020-07-20
Payer: MEDICAID

## 2020-07-20 VITALS
RESPIRATION RATE: 12 BRPM | HEART RATE: 92 BPM | SYSTOLIC BLOOD PRESSURE: 155 MMHG | OXYGEN SATURATION: 100 % | DIASTOLIC BLOOD PRESSURE: 74 MMHG

## 2020-07-20 VITALS
OXYGEN SATURATION: 97 % | SYSTOLIC BLOOD PRESSURE: 125 MMHG | DIASTOLIC BLOOD PRESSURE: 82 MMHG | TEMPERATURE: 98 F | HEART RATE: 96 BPM | WEIGHT: 171.96 LBS | RESPIRATION RATE: 18 BRPM | HEIGHT: 66 IN

## 2020-07-20 DIAGNOSIS — C53.0 MALIGNANT NEOPLASM OF ENDOCERVIX: ICD-10-CM

## 2020-07-20 DIAGNOSIS — Z90.89 ACQUIRED ABSENCE OF OTHER ORGANS: Chronic | ICD-10-CM

## 2020-07-20 DIAGNOSIS — Z90.49 ACQUIRED ABSENCE OF OTHER SPECIFIED PARTS OF DIGESTIVE TRACT: Chronic | ICD-10-CM

## 2020-07-20 DIAGNOSIS — Z98.890 OTHER SPECIFIED POSTPROCEDURAL STATES: Chronic | ICD-10-CM

## 2020-07-20 PROCEDURE — 77771 HDR RDNCL NTRSTL/ICAV BRCHTX: CPT | Mod: 26

## 2020-07-20 PROCEDURE — 77295 3-D RADIOTHERAPY PLAN: CPT | Mod: 26

## 2020-07-20 RX ORDER — OXYCODONE HYDROCHLORIDE 5 MG/1
5 TABLET ORAL ONCE
Refills: 0 | Status: DISCONTINUED | OUTPATIENT
Start: 2020-07-20 | End: 2020-07-20

## 2020-07-20 RX ORDER — SODIUM CHLORIDE 9 MG/ML
1000 INJECTION, SOLUTION INTRAVENOUS
Refills: 0 | Status: DISCONTINUED | OUTPATIENT
Start: 2020-07-20 | End: 2020-08-04

## 2020-07-20 RX ADMIN — OXYCODONE HYDROCHLORIDE 5 MILLIGRAM(S): 5 TABLET ORAL at 10:05

## 2020-07-20 RX ADMIN — SODIUM CHLORIDE 100 MILLILITER(S): 9 INJECTION, SOLUTION INTRAVENOUS at 08:38

## 2020-07-20 NOTE — BRIEF OPERATIVE NOTE - NSICDXBRIEFPROCEDURE_GEN_ALL_CORE_FT
PROCEDURES:  Insertion, tandem and ring, with Krzysztof sleeve 20-Jul-2020 08:27:55  Harrison Delgado
PROCEDURES:  Insertion, tandem and ring, with Krzysztof sleeve 20-Jul-2020 08:27:55  Harrison Delgado

## 2020-07-20 NOTE — BRIEF OPERATIVE NOTE - NSICDXBRIEFPREOP_GEN_ALL_CORE_FT
PRE-OP DIAGNOSIS:  Cervical cancer 20-Jul-2020 08:28:06 stage IB Harrison Delgado
PRE-OP DIAGNOSIS:  Cervical cancer 20-Jul-2020 08:28:06 stage IB Harrison Delgado

## 2020-07-20 NOTE — CHART NOTE - NSCHARTNOTEFT_GEN_A_CORE
PACU ANESTHESIA ADMISSION NOTE      Procedure: Insertion, tandem and ring, with Krzysztof sleeve    Post op diagnosis:  Cervical cancer      ____  Intubated  TV:______       Rate: ______      FiO2: ______    _x___  Patent Airway    _x___  Full return of protective reflexes    _x___  Full recovery from anesthesia / back to baseline status      Mental Status:  _x___ Awake   _____ Alert   _____ Drowsy   _____ Sedated    Nausea/Vomiting:  _x___  NO       ______Yes,   See Post - Op Orders         Pain Scale (0-10):  __0___    Treatment: _x___ None    ____ See Post - Op/PCA Orders    Post - Operative Fluids:   __x__ Oral   ____ See Post - Op Orders    Plan: Discharge:   _x___Home       _____Floor     _____Critical Care    _____  Other:_________________    Comments:  No anesthesia issues or complications noted.  Discharge when criteria met.

## 2020-07-20 NOTE — ASU DISCHARGE PLAN (ADULT/PEDIATRIC) - CARE PROVIDER_API CALL
Cristian De Souza  RADIATION ONCOLOGY  [address] [place] [id number]  Phone: [phone]  Fax: [phone]  Established Patient  Follow Up Time:

## 2020-07-20 NOTE — BRIEF OPERATIVE NOTE - OPERATION/FINDINGS
JUAN ANTONIO OSHEA was admitted to the [facility] for her first of 3 planned T&R insertions for her cervical cancer. She identified and consented and brought to the operating room where LMA anesthesia was established.  Exam under anesthesia noted good response to treatment with no visible or palpable tumor.   A time out was then performed.  The perineum/vagina was prepped and a cuadra catheter was then inserted. The cervix was localized by speculum exam.  A endocervical “sleeve” was placed by Dr. Burton.  A 45 degree tandem and ring were chosen.  There was a 5 mm cap used.  The insertion was completed with the rectal paddle. . 2 inch vaginal gauze packing soaked in surgilube was used to secure the apparatus and shield the bladder/rectum.  The procedure was complete and she left the OR in good condition.     Upon being released to the Radiation Medicine department, a CT was done for treatment planning.  There was no perforation. The apparatus was in good position. The bladder, rectum, CTV and pt A were drawn.  A treatment plan was generated to deliver 8 Gy to the CTV.  The patient was taken to the treatment room and connected to the HDR afterloader unit.  Therapy was delivered over 617 seconds via channels 1, 2.  Both myself and the physicist  were present for the entire procedure. After treatment was complete, the applicators were removed.  There was no bleeding. The patient was discharged to home in good condition.
uterus sounded to 5cm, vagina and parametrium free of involvement

## 2020-07-23 DIAGNOSIS — Z88.2 ALLERGY STATUS TO SULFONAMIDES: ICD-10-CM

## 2020-07-23 DIAGNOSIS — M19.90 UNSPECIFIED OSTEOARTHRITIS, UNSPECIFIED SITE: ICD-10-CM

## 2020-07-23 DIAGNOSIS — Z85.3 PERSONAL HISTORY OF MALIGNANT NEOPLASM OF BREAST: ICD-10-CM

## 2020-07-23 DIAGNOSIS — C53.9 MALIGNANT NEOPLASM OF CERVIX UTERI, UNSPECIFIED: ICD-10-CM

## 2020-07-23 DIAGNOSIS — J44.9 CHRONIC OBSTRUCTIVE PULMONARY DISEASE, UNSPECIFIED: ICD-10-CM

## 2020-07-24 ENCOUNTER — LABORATORY RESULT (OUTPATIENT)
Age: 60
End: 2020-07-24

## 2020-07-24 ENCOUNTER — OUTPATIENT (OUTPATIENT)
Dept: OUTPATIENT SERVICES | Facility: HOSPITAL | Age: 60
LOS: 1 days | Discharge: HOME | End: 2020-07-24

## 2020-07-24 DIAGNOSIS — Z90.49 ACQUIRED ABSENCE OF OTHER SPECIFIED PARTS OF DIGESTIVE TRACT: Chronic | ICD-10-CM

## 2020-07-24 DIAGNOSIS — Z90.89 ACQUIRED ABSENCE OF OTHER ORGANS: Chronic | ICD-10-CM

## 2020-07-24 DIAGNOSIS — Z98.890 OTHER SPECIFIED POSTPROCEDURAL STATES: Chronic | ICD-10-CM

## 2020-07-24 DIAGNOSIS — Z11.59 ENCOUNTER FOR SCREENING FOR OTHER VIRAL DISEASES: ICD-10-CM

## 2020-07-24 NOTE — ASU PATIENT PROFILE, ADULT - PMH
Asthma    Breast cancer  right- s/p radiation and lumpectomy  Cervical cancer  chemo last 7/7/2020, RAD RX  COPD (chronic obstructive pulmonary disease)  USES VENTOLIN DAILY  Eczema    OA (osteoarthritis)    Seasonal allergies

## 2020-07-27 ENCOUNTER — OUTPATIENT (OUTPATIENT)
Dept: OUTPATIENT SERVICES | Facility: HOSPITAL | Age: 60
LOS: 1 days | Discharge: HOME | End: 2020-07-27

## 2020-07-27 VITALS
OXYGEN SATURATION: 96 % | HEART RATE: 105 BPM | TEMPERATURE: 98 F | WEIGHT: 171.96 LBS | DIASTOLIC BLOOD PRESSURE: 91 MMHG | HEIGHT: 66 IN | RESPIRATION RATE: 18 BRPM | SYSTOLIC BLOOD PRESSURE: 156 MMHG

## 2020-07-27 VITALS
HEART RATE: 96 BPM | OXYGEN SATURATION: 96 % | RESPIRATION RATE: 18 BRPM | SYSTOLIC BLOOD PRESSURE: 149 MMHG | TEMPERATURE: 98 F | DIASTOLIC BLOOD PRESSURE: 78 MMHG

## 2020-07-27 DIAGNOSIS — Z90.49 ACQUIRED ABSENCE OF OTHER SPECIFIED PARTS OF DIGESTIVE TRACT: Chronic | ICD-10-CM

## 2020-07-27 DIAGNOSIS — Z98.890 OTHER SPECIFIED POSTPROCEDURAL STATES: Chronic | ICD-10-CM

## 2020-07-27 DIAGNOSIS — Z90.89 ACQUIRED ABSENCE OF OTHER ORGANS: Chronic | ICD-10-CM

## 2020-07-27 RX ORDER — PROCHLORPERAZINE MALEATE 5 MG
0 TABLET ORAL
Qty: 0 | Refills: 0 | DISCHARGE

## 2020-07-27 RX ORDER — UMECLIDINIUM BROMIDE AND VILANTEROL TRIFENATATE 62.5; 25 UG/1; UG/1
1 POWDER RESPIRATORY (INHALATION)
Qty: 0 | Refills: 0 | DISCHARGE

## 2020-07-27 RX ORDER — OXYCODONE HYDROCHLORIDE 5 MG/1
0 TABLET ORAL
Qty: 0 | Refills: 0 | DISCHARGE

## 2020-07-27 RX ORDER — DICLOFENAC SODIUM 30 MG/G
0 GEL TOPICAL
Qty: 0 | Refills: 0 | DISCHARGE

## 2020-07-27 RX ORDER — MONTELUKAST 4 MG/1
1 TABLET, CHEWABLE ORAL
Qty: 0 | Refills: 0 | DISCHARGE

## 2020-07-27 RX ORDER — CYCLOBENZAPRINE HYDROCHLORIDE 10 MG/1
0 TABLET, FILM COATED ORAL
Qty: 0 | Refills: 0 | DISCHARGE

## 2020-07-27 RX ORDER — OMEPRAZOLE 10 MG/1
1 CAPSULE, DELAYED RELEASE ORAL
Qty: 0 | Refills: 0 | DISCHARGE

## 2020-07-27 RX ORDER — LETROZOLE 2.5 MG/1
1 TABLET, FILM COATED ORAL
Qty: 0 | Refills: 0 | DISCHARGE

## 2020-07-27 RX ORDER — ONDANSETRON 8 MG/1
0 TABLET, FILM COATED ORAL
Qty: 0 | Refills: 0 | DISCHARGE

## 2020-07-27 RX ORDER — ALBUTEROL 90 UG/1
2 AEROSOL, METERED ORAL
Qty: 0 | Refills: 0 | DISCHARGE

## 2020-07-27 RX ORDER — LOPERAMIDE HCL/SIMETHICONE 2-125MG
1 TABLET,CHEWABLE ORAL
Qty: 0 | Refills: 0 | DISCHARGE

## 2020-07-27 RX ORDER — IPRATROPIUM/ALBUTEROL SULFATE 18-103MCG
3 AEROSOL WITH ADAPTER (GRAM) INHALATION
Qty: 0 | Refills: 0 | DISCHARGE

## 2020-07-27 NOTE — CHART NOTE - NSCHARTNOTEFT_GEN_A_CORE
PGY-2 Note:  Pt scheduled for tandem and ring insertion with Dr. De Souza today for brachytherapy for cervical cancer. Pt brought back to the OR and anesthesia attempted to gain vascular access. Anesthesia attempted to gain access using bilateral arms and the patients right foot. Two anesthesiologist attempted to gain access and were unable to obtain vascular access. Pt refused IV placement in the neck.   Procedure aborted due to inability obtain vascular access for administration of anesthesia.   Pt to be discharged home and discuss additional management options with Dr. De Souza outpatient.    Dr. De Souza aware

## 2020-07-27 NOTE — H&P PST ADULT - ASSESSMENT
A/P: 60yo F with cervical cancer s/p smith sleeve insertion presents for tandem and ring insertion for brachytherapy   -IV access  -IVF hydration   -on call to OR     Dr. De Souza aware

## 2020-07-27 NOTE — ANESTHESIA FOLLOW-UP NOTE - NSEVALATIONFT_GEN_ALL_CORE
Multiple IV attempts by multiple anesthesia providers without obtaining IV access. Discussed with Dr. Salgado. Case canceled at this time. Will plan for PICC.

## 2020-07-27 NOTE — ASU PREOP CHECKLIST - NS PREOP CHK MONITOR ANESTHESIA CONSENT
Myrtle Valdez is a 19 y.o. female POD #4 status post primary classical C/S at 27w5d in a pregnancy complicated by PreE w/ SF (BP), FGR, REDF, hyperechoic fetal bowel, asthma, and h/o chlamydia. Patient is doing well this morning. She denies nausea, vomiting.  Patient reports mild abdominal pain that is adequately relieved by oral pain medications. Lochia is mild. She is voiding and ambulating without difficulty. She has passed flatus, and has not had BM.  Denies HA/vision changes, CP/SOB, RUQ pain, LE edema.  Patient is currently pumping for infant in the NICU. Desires Depo Provera for contraception.      Temp:  [95.9 °F (35.5 °C)-98.8 °F (37.1 °C)] 97.2 °F (36.2 °C)  Pulse:  [] 95  Resp:  [16-18] 16  SpO2:  [90 %-100 %] 99 %  BP: (112-147)/(65-95) 117/65    Physical Exam:   Constitutional: She appears well-developed and well-nourished. No distress.       Cardiovascular: Normal rate and regular rhythm.     Pulmonary/Chest: Effort normal and breath sounds normal.        Abdominal: Soft. Bowel sounds are normal. She exhibits abdominal incision (c/d/i). She exhibits no distension. There is tenderness (appropriate postoperative).             Musculoskeletal: She exhibits no edema.       Neurological: She is alert. She has normal reflexes.    Skin: Skin is warm and dry.    Psychiatric: She has a normal mood and affect. Her behavior is normal. Judgment and thought content normal.       I/O    Intake/Output Summary (Last 24 hours) at 09/10/17 0754  Last data filed at 09/10/17 0600   Gross per 24 hour   Intake             2403 ml   Output             4960 ml   Net            -2557 ml      done

## 2020-07-27 NOTE — H&P PST ADULT - NSICDXPASTSURGICALHX_GEN_ALL_CORE_FT
PAST SURGICAL HISTORY:  History of D&C cone biopsy- 3/2020    History of tonsillectomy     S/P cholecystectomy     S/P lumpectomy, right breast local rad rx

## 2020-07-27 NOTE — H&P PST ADULT - HISTORY OF PRESENT ILLNESS
59yr old female states was diagnosed with cervical cancer recently- presents today to pretesting for insertion of smith sleeve tandem and ring for high dose rate brachy RX. Will have 4 total cycles- consecutive weeks. Denies COVID s/s. denies sick contacts. Exercise carl 12- blocks LTD by fatigue and pain in the LEs. Was advised will need COVID prior to each cycle. Verbalizes understanding.

## 2020-07-27 NOTE — BRIEF OPERATIVE NOTE - NSICDXBRIEFPROCEDURE_GEN_ALL_CORE_FT
PROCEDURES:  Exam, under anesthesia, with tandem and ring insertion or placement 27-Jul-2020 08:32:46 aborted due to anesthesia unable to obtain vascular access Lizet Perez

## 2020-07-27 NOTE — ASU DISCHARGE PLAN (ADULT/PEDIATRIC) - CARE PROVIDER_API CALL
rCistian De Souza  RADIATION ONCOLOGY  [address] [place] [id number]  Phone: [phone]  Fax: [phone]  Follow Up Time:

## 2020-07-27 NOTE — PRE-ANESTHESIA EVALUATION ADULT - NSANTHOSAYNRD_GEN_A_CORE
No. GALILEO screening performed.  STOP BANG Legend: 0-2 = LOW Risk; 3-4 = INTERMEDIATE Risk; 5-8 = HIGH Risk

## 2020-07-27 NOTE — BRIEF OPERATIVE NOTE - COMMENTS
procedure aborted due to anesthesia unable to obtain vascular access despite numerous attempts in multiple extremities

## 2020-07-29 DIAGNOSIS — Z53.8 PROCEDURE AND TREATMENT NOT CARRIED OUT FOR OTHER REASONS: ICD-10-CM

## 2020-07-29 DIAGNOSIS — C53.9 MALIGNANT NEOPLASM OF CERVIX UTERI, UNSPECIFIED: ICD-10-CM

## 2020-08-20 PROCEDURE — 77334 RADIATION TREATMENT AID(S): CPT | Mod: 26

## 2020-08-24 ENCOUNTER — RESULT REVIEW (OUTPATIENT)
Age: 60
End: 2020-08-24

## 2020-08-24 ENCOUNTER — OUTPATIENT (OUTPATIENT)
Dept: OUTPATIENT SERVICES | Facility: HOSPITAL | Age: 60
LOS: 1 days | Discharge: HOME | End: 2020-08-24
Payer: MEDICAID

## 2020-08-24 DIAGNOSIS — Z98.890 OTHER SPECIFIED POSTPROCEDURAL STATES: Chronic | ICD-10-CM

## 2020-08-24 DIAGNOSIS — R92.8 OTHER ABNORMAL AND INCONCLUSIVE FINDINGS ON DIAGNOSTIC IMAGING OF BREAST: ICD-10-CM

## 2020-08-24 DIAGNOSIS — Z90.89 ACQUIRED ABSENCE OF OTHER ORGANS: Chronic | ICD-10-CM

## 2020-08-24 DIAGNOSIS — Z90.49 ACQUIRED ABSENCE OF OTHER SPECIFIED PARTS OF DIGESTIVE TRACT: Chronic | ICD-10-CM

## 2020-08-24 PROCEDURE — 77066 DX MAMMO INCL CAD BI: CPT | Mod: 26

## 2020-08-24 PROCEDURE — 76642 ULTRASOUND BREAST LIMITED: CPT | Mod: 26,LT

## 2020-08-24 PROCEDURE — 77062 BREAST TOMOSYNTHESIS BI: CPT | Mod: 26

## 2020-08-25 DIAGNOSIS — Z78.0 ASYMPTOMATIC MENOPAUSAL STATE: ICD-10-CM

## 2020-08-25 DIAGNOSIS — F17.200 NICOTINE DEPENDENCE, UNSPECIFIED, UNCOMPLICATED: ICD-10-CM

## 2020-08-25 DIAGNOSIS — M89.9 DISORDER OF BONE, UNSPECIFIED: ICD-10-CM

## 2020-08-25 DIAGNOSIS — Z13.820 ENCOUNTER FOR SCREENING FOR OSTEOPOROSIS: ICD-10-CM

## 2020-09-01 ENCOUNTER — APPOINTMENT (OUTPATIENT)
Dept: HEMATOLOGY ONCOLOGY | Facility: CLINIC | Age: 60
End: 2020-09-01
Payer: MEDICAID

## 2020-09-01 VITALS
DIASTOLIC BLOOD PRESSURE: 88 MMHG | SYSTOLIC BLOOD PRESSURE: 176 MMHG | TEMPERATURE: 97.3 F | WEIGHT: 166 LBS | HEIGHT: 66 IN | BODY MASS INDEX: 26.68 KG/M2 | HEART RATE: 115 BPM

## 2020-09-01 DIAGNOSIS — R92.8 OTHER ABNORMAL AND INCONCLUSIVE FINDINGS ON DIAGNOSTIC IMAGING OF BREAST: ICD-10-CM

## 2020-09-01 PROCEDURE — 99214 OFFICE O/P EST MOD 30 MIN: CPT

## 2020-09-01 NOTE — CONSULT LETTER
[Dear  ___] : Dear  [unfilled], [Consult Letter:] : I had the pleasure of evaluating your patient, [unfilled]. [Please see my note below.] : Please see my note below. [Consult Closing:] : Thank you very much for allowing me to participate in the care of this patient.  If you have any questions, please do not hesitate to contact me. [Sincerely,] : Sincerely, [FreeTextEntry3] : Palmer Hall MD\par Professor [employer]\par Viv/Di\par Attending Physician\par [facility]\par [phone]\par \par

## 2020-09-01 NOTE — PHYSICAL EXAM
[Fully active, able to carry on all pre-disease performance without restriction] : Status 0 - Fully active, able to carry on all pre-disease performance without restriction [Normal] : affect appropriate [de-identified] : rt side lumpectomy scar noted. No palpable masses/ axillary LNs [de-identified] : lower ext bug bites

## 2020-09-01 NOTE — HISTORY OF PRESENT ILLNESS
[Disease: _____________________] : Disease: [unfilled] [T: ___] : T[unfilled] [N: ___] : N[unfilled] [M: ___] : M[unfilled] [AJCC Stage: ____] : AJCC Stage: [unfilled] [de-identified] : Patient is a 57 year old year-old postmenopausal female transferring her care to me. \par \par She has history of ER and ME positive 0.6 mm well, differentiated, microinvasive ductal carcinoma  ER/ME (+), HER2 (-). status post surgery currently on adjuvant Femara started in July 2015. \par She is s/p Right NLOC/SNB/RF Spect/XOFT 06/15/15 - 0/1 (-); DCIS intermed grade, no residual IDC; (-) margins\par Patient is taking calcium and vitamin D .\par She is refusing Pap smears.\par No FHx breast/ovarian cancer.\par  [de-identified] : IDC [de-identified] : ER and CO positive, and her-2 negative [de-identified] : < 1mm (microinvasive)\par UOQ [de-identified] : She has not been here since 11/18/16. However she states she has been compliant with Femara.\par She does not exercise.\par Her appetite is good.\par She denies any adverse events.\par \par 7/30/18:\par Doing well. Mammo in July 2018 was normal. DEXA in July 2018 showed osteopenia. \par C/o burning and pain while passing urine. Started few days ago, resolved by itself and recurred last night. Did not take any Abx for UTI. \par Compliant with Letrozole. Denies fever, nausea, vomiting, chest pain, SOB, abdominal pain, bowel problems.\par Colonoscopy done in 2017 and it was normal. \par \par 01/28/2019\par Patient is here for a follow up visit. \par C/O of weight gain and increased appetite. But otherwise tolerating letrozole well. \par Mammogram due in  july 2019. \par Denies fever, nausea, vomiting, chest pain, SOB, abdominal pain, bowel problems.\par Colonoscopy done in 2017 and it was normal. \par \par 7/8/19\par pt is here for follow up.\par Denies fever, nausea, vomiting, chest pain, SOB, abdominal pain, bowel problems.\par she had felt a lump under her left axilla, had mamamo/usg done neg as noted below.\par compliant with Femara.\par says she was diagnosed with iron deficiency last October??.\par states she was advised IV iron but has been taking po iron.\par Not taking Ca + D\par After extensive questioning pt finally admitted that she has been having vaginal spotting for several months.\par She has not seen a GYN in a long time\par \par 2/10/20\par  Patient here for follow up.  She denies any new complaints.  Patient denies any new palpable breast lumps or pain, denies skin changes, denies nipple discharge.  Patient denies cough, shortness of breath, denies fever, denies bone pain.\par compliant with letrozole.\par Still has vaginal spotting off and on.\par Did not go for USG of pelvis or GYN eval as advised previously.\par Pt states that this is not her first priority.\par Continues to smoke.\par No abdominal pain\par \par 4/13/2020: The patient is here for follow up . She remains on letrozole since July 2015. She denies fever, chills, no weight loss, no chest pain or shortness of breath . She is actively smoking. 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 She underwent conization with endometrial biopsy and vaginal exam on 3/2/2020 . 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 The patient is still reporting vaginal spotting intermittently\par \par \par 5/7/20\par Pt is here for follow up.\par She has completed MRI pelvis and was seen in Rad/Onc.\par No new symptoms\par \par 06/02/2020\par JUAN ANTONIO OSHEA a 59 year F is here today for follow up of Breast Ca and cervical cancer.\par Has been complaint with femara since 9/2015 vitamin and calcium.  Pt does not want to continue Femara while on Chemo and RT. \par Patient denies any new palpable breast lumps or pain, denies skin changes, denies nipple discharge. \par Denies vaginal bleeding, bloating, abdominal pain. Denies dysuria, fever, chills. \par She started RT today and will start weekly cisplatin today.  \par She had PET on 5/14: Focal FDG activity, proximal left lower lobe associated with approximate 8 mm branch point nodule (axial image 195, max SUV 4.2). Follow-up diagnostic CT scan 3 months time recommended. New FDG avid 8 mm left pelvic sidewall lymph node (axial 103, max SUV 9.8), suspicious for biologic tumor activity. \par CBC reviewed. \par \par 6/8/20\par Pt is here for follow up.\par She is s/p 1 weekly dose of Cisplatin which she tolerated well.\par Her only complaint is of heart burn since last week.\par In addition she has been experiencing insomnia.\par No N, V, D.\par Had mild vaginal bleeding.\par No abd pain.\par \par 6/15/20\par Pt is here for follow up.\par Is s/p 2 weekly treatments of cisplatin. Tolerating it well. No N or V.\par Had diarrhea last week which resolved spontaneously.\par No abd pain or vag bleeding.\par No fever.\par C/o insomnia, has been drinking a lot of caffeinated beverages\par \par 6/29/20\par pT is here for follow up.\par Tolerating chemo well.\par No reports of N, V. Has loose BMs occasionally well managed with Imodium.\par No vag bleeding\par \par 7/6/20\par Pt is here for follow up.\par No new complaints. Will be finishing RT tomorrow.\par No vag bleeding or pelvic pain.\par No fever, N, V, D\par compliant with femara\par \par 7/13/20- Juan Antonio is here for for follow up. She remains on femara, Ca and Vitamin D,. she still has not got the US breast yet. She continues to smoke. She completed radiation EBRT and is planned for brachytherapy. Her appetite is good. With the last treatment she started having diarrhea, nausea which is better when she takes prn meds. Pepcid helping with reflux\par No vaginal bleeding. \par \par 9/1/2020\par Patient is here for a follow-up visit for Breast and cervical cancer.  \par Patient denies fever, chills, nausea, vomiting, new pain or bleeding.\par She continues to take Femara, Calcium and Vitamin D.  She is approaching 5 years on Femara at the end of Sept 2020.\par She is anticipating brachytherapy with Dr. Delacruz.  \par DEXA showed osteopenia in AP spine, otherwise normal.  \par Patient reports she was unable to perform 2nd brachtherapy dose due to issues with IV access but is scheduled again for later this week.\par Reviewed most recent diagnostic mammo + sono from 08/24/2020 which was BI-RADS 2: Benign.

## 2020-09-01 NOTE — ASSESSMENT
[FreeTextEntry1] : 60 yr old postmenopausal female with microinvasive hormone receptor positive breast cancer on adjuvant Femara since Sept 2015. \par \par New diagnosis of early stage cervical cancer ( Stage IIA1) s/p conization on 3/2/2020\par \par 1. DCIS breast : Continue Femara, Ca + Vit D - for a total of 5 yrs (September 2020) \par The side effects from such treatment were discussed in detail including but not limited to hot flashes, weight gain, hair thinning, arthralgia, myalgia, bone loss, increased risk of osteoporotic fractures and thrombo-embolism.\par She will take Ca + VIt D daily while on AI.  She will continue Femara until the end of Sept 2020 and STOP since it is 5 years on treatment.\par Her compliance and any side effects from such treatment were assessed at today's visit\par She continues to smoke.  Once again, advised to stop smoking\par \par Annual mammogram to be performed again in 08/2021\par \par 2. New diagnosis of stage IIA1 poorly to moderately differentiated cervical squamous cell carcinoma, Per MRI results the cancer is more extensive than initially thought. Last PET done 5/2020\par She completed ChemoRT and is now scheduled to get brachytherapy with Dr De Souza \par Repeat PET scan to be done ~8 weeks post completion of RT\par CBC, CMP and Magnesium \par She takes prn imodium and anti nausea meds and prilosec to GERD\par \par RTC in 6 weeks with CBC, CMP

## 2020-09-01 NOTE — REVIEW OF SYSTEMS
[Fatigue] : fatigue [Negative] : Allergic/Immunologic [Dysuria] : no dysuria [FreeTextEntry7] : nausea

## 2020-09-03 RX ORDER — TRIAMCINOLONE ACETONIDE 1 MG/G
0.1 CREAM TOPICAL
Qty: 80 | Refills: 0 | Status: DISCONTINUED | COMMUNITY
Start: 2020-07-29

## 2020-09-03 RX ORDER — DICLOFENAC SODIUM 10 MG/G
1 GEL TOPICAL
Qty: 100 | Refills: 0 | Status: ACTIVE | COMMUNITY
Start: 2020-07-29

## 2020-09-03 RX ORDER — IPRATROPIUM BROMIDE AND ALBUTEROL SULFATE 2.5; .5 MG/3ML; MG/3ML
0.5-2.5 (3) SOLUTION RESPIRATORY (INHALATION)
Qty: 180 | Refills: 0 | Status: DISCONTINUED | COMMUNITY
Start: 2020-04-01

## 2020-09-10 ENCOUNTER — OUTPATIENT (OUTPATIENT)
Dept: OUTPATIENT SERVICES | Facility: HOSPITAL | Age: 60
LOS: 1 days | Discharge: HOME | End: 2020-09-10
Payer: MEDICAID

## 2020-09-10 DIAGNOSIS — Z90.49 ACQUIRED ABSENCE OF OTHER SPECIFIED PARTS OF DIGESTIVE TRACT: Chronic | ICD-10-CM

## 2020-09-10 DIAGNOSIS — Z98.890 OTHER SPECIFIED POSTPROCEDURAL STATES: Chronic | ICD-10-CM

## 2020-09-10 DIAGNOSIS — Z90.89 ACQUIRED ABSENCE OF OTHER ORGANS: Chronic | ICD-10-CM

## 2020-09-10 DIAGNOSIS — C53.9 MALIGNANT NEOPLASM OF CERVIX UTERI, UNSPECIFIED: ICD-10-CM

## 2020-10-13 ENCOUNTER — OUTPATIENT (OUTPATIENT)
Dept: OUTPATIENT SERVICES | Facility: HOSPITAL | Age: 60
LOS: 1 days | Discharge: HOME | End: 2020-10-13

## 2020-10-13 ENCOUNTER — LABORATORY RESULT (OUTPATIENT)
Age: 60
End: 2020-10-13

## 2020-10-13 ENCOUNTER — APPOINTMENT (OUTPATIENT)
Dept: HEMATOLOGY ONCOLOGY | Facility: CLINIC | Age: 60
End: 2020-10-13
Payer: MEDICAID

## 2020-10-13 VITALS
BODY MASS INDEX: 28.77 KG/M2 | DIASTOLIC BLOOD PRESSURE: 77 MMHG | HEART RATE: 96 BPM | RESPIRATION RATE: 14 BRPM | WEIGHT: 179 LBS | TEMPERATURE: 98.3 F | SYSTOLIC BLOOD PRESSURE: 112 MMHG | HEIGHT: 66 IN

## 2020-10-13 DIAGNOSIS — R92.8 OTHER ABNORMAL AND INCONCLUSIVE FINDINGS ON DIAGNOSTIC IMAGING OF BREAST: ICD-10-CM

## 2020-10-13 DIAGNOSIS — F17.200 NICOTINE DEPENDENCE, UNSPECIFIED, UNCOMPLICATED: ICD-10-CM

## 2020-10-13 DIAGNOSIS — Z79.811 LONG TERM (CURRENT) USE OF AROMATASE INHIBITORS: ICD-10-CM

## 2020-10-13 DIAGNOSIS — C53.9 MALIGNANT NEOPLASM OF CERVIX UTERI, UNSPECIFIED: ICD-10-CM

## 2020-10-13 DIAGNOSIS — Z98.890 OTHER SPECIFIED POSTPROCEDURAL STATES: Chronic | ICD-10-CM

## 2020-10-13 DIAGNOSIS — C50.411 MALIGNANT NEOPLASM OF UPPER-OUTER QUADRANT OF RIGHT FEMALE BREAST: ICD-10-CM

## 2020-10-13 DIAGNOSIS — Z90.89 ACQUIRED ABSENCE OF OTHER ORGANS: Chronic | ICD-10-CM

## 2020-10-13 DIAGNOSIS — Z51.11 ENCOUNTER FOR ANTINEOPLASTIC CHEMOTHERAPY: ICD-10-CM

## 2020-10-13 DIAGNOSIS — Z90.49 ACQUIRED ABSENCE OF OTHER SPECIFIED PARTS OF DIGESTIVE TRACT: Chronic | ICD-10-CM

## 2020-10-13 DIAGNOSIS — R19.7 DIARRHEA, UNSPECIFIED: ICD-10-CM

## 2020-10-13 LAB
HCT VFR BLD CALC: 41 %
HGB BLD-MCNC: 13.4 G/DL
MCHC RBC-ENTMCNC: 30.7 PG
MCHC RBC-ENTMCNC: 32.7 G/DL
MCV RBC AUTO: 94 FL
PLATELET # BLD AUTO: 166 K/UL
PMV BLD: 11.1 FL
RBC # BLD: 4.36 M/UL
RBC # FLD: 12.8 %
WBC # FLD AUTO: 6.77 K/UL

## 2020-10-13 PROCEDURE — 99213 OFFICE O/P EST LOW 20 MIN: CPT

## 2020-10-15 ENCOUNTER — APPOINTMENT (OUTPATIENT)
Dept: RADIATION ONCOLOGY | Facility: HOSPITAL | Age: 60
End: 2020-10-15
Payer: MEDICAID

## 2020-10-15 VITALS
OXYGEN SATURATION: 96 % | DIASTOLIC BLOOD PRESSURE: 73 MMHG | TEMPERATURE: 97 F | SYSTOLIC BLOOD PRESSURE: 113 MMHG | BODY MASS INDEX: 28.25 KG/M2 | HEART RATE: 100 BPM | WEIGHT: 175 LBS | RESPIRATION RATE: 16 BRPM

## 2020-10-15 PROCEDURE — 99024 POSTOP FOLLOW-UP VISIT: CPT

## 2020-10-15 RX ORDER — LETROZOLE TABLETS 2.5 MG/1
2.5 TABLET, FILM COATED ORAL DAILY
Qty: 30 | Refills: 1 | Status: DISCONTINUED | COMMUNITY
Start: 2017-01-25 | End: 2020-10-15

## 2020-10-15 NOTE — HISTORY OF PRESENT ILLNESS
[FreeTextEntry1] : 9/3/20 OTV- Pt reports occasional heartburn and nausea. Reports occasional diarrhea. takes Imodium for relief.

## 2020-10-15 NOTE — DISEASE MANAGEMENT
[Clinical] : TNM Stage: c [IB] : IB [TTNM] : 1b [NTNM] : 0 [MTNM] : 0 [de-identified] : [id number] [de-identified] : Cervix [de-identified] : [id number]

## 2020-10-16 NOTE — PAST MEDICAL HISTORY
[Postmenopausal] : The patient is postmenopausal [Menarche Age ____] : age at menarche was [unfilled] [Menopause Age____] : age at menopause was [unfilled] [Total Preg ___] : G[unfilled] [Live Births ___] : P[unfilled]  [Age At Live Birth ___] : Age at live birth: [unfilled] [FreeTextEntry5] : none [History of Hormone Replacement Treatment] : has no history of hormone replacement treatment [FreeTextEntry8] : none [FreeTextEntry7] : none [FreeTextEntry6] : none

## 2020-10-16 NOTE — HISTORY OF PRESENT ILLNESS
[Disease: _____________________] : Disease: [unfilled] [T: ___] : T[unfilled] [N: ___] : N[unfilled] [M: ___] : M[unfilled] [AJCC Stage: ____] : AJCC Stage: [unfilled] [de-identified] : Patient is a 57 year old year-old postmenopausal female transferring her care to me. \par \par She has history of ER and CT positive 0.6 mm well, differentiated, microinvasive ductal carcinoma  ER/CT (+), HER2 (-). status post surgery currently on adjuvant Femara started in July 2015. \par She is s/p Right NLOC/SNB/RF Spect/XOFT 06/15/15 - 0/1 (-); DCIS intermed grade, no residual IDC; (-) margins\par Patient is taking calcium and vitamin D .\par She is refusing Pap smears.\par No FHx breast/ovarian cancer.\par  [de-identified] : IDC [de-identified] : ER and [place] positive, and her-2 negative [de-identified] : < 1mm (microinvasive)\par UOQ [de-identified] : She has not been here since 11/18/16. However she states she has been compliant with Femara.\par She does not exercise.\par Her appetite is good.\par She denies any adverse events.\par \par 7/30/18:\par Doing well. Mammo in July 2018 was normal. DEXA in July 2018 showed osteopenia. \par C/o burning and pain while passing urine. Started few days ago, resolved by itself and recurred last night. Did not take any Abx for UTI. \par Compliant with Letrozole. Denies fever, nausea, vomiting, chest pain, SOB, abdominal pain, bowel problems.\par Colonoscopy done in 2017 and it was normal. \par \par 01/28/2019\par Patient is here for a follow up visit. \par C/O of weight gain and increased appetite. But otherwise tolerating letrozole well. \par Mammogram due in  july 2019. \par Denies fever, nausea, vomiting, chest pain, SOB, abdominal pain, bowel problems.\par Colonoscopy done in 2017 and it was normal. \par \par 7/8/19\par pt is here for follow up.\par Denies fever, nausea, vomiting, chest pain, SOB, abdominal pain, bowel problems.\par she had felt a lump under her left axilla, had mamamo/usg done neg as noted below.\par compliant with Femara.\par says she was diagnosed with iron deficiency last October??.\par states she was advised IV iron but has been taking po iron.\par Not taking Ca + D\par After extensive questioning pt finally admitted that she has been having vaginal spotting for several months.\par She has not seen a GYN in a long time\par \par 2/10/20\par  Patient here for follow up.  She denies any new complaints.  Patient denies any new palpable breast lumps or pain, denies skin changes, denies nipple discharge.  Patient denies cough, shortness of breath, denies fever, denies bone pain.\par compliant with letrozole.\par Still has vaginal spotting off and on.\par Did not go for USG of pelvis or GYN eval as advised previously.\par Pt states that this is not her first priority.\par Continues to smoke.\par No abdominal pain\par \par 4/13/2020: The patient is here for follow up . She remains on letrozole since July 2015. She denies fever, chills, no weight loss, no chest pain or shortness of breath . She is actively smoking. 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 She underwent conization with endometrial biopsy and vaginal exam on 3/2/2020 . 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 The patient is still reporting vaginal spotting intermittently\par \par \par 5/7/20\par Pt is here for follow up.\par She has completed MRI pelvis and was seen in Rad/Onc.\par No new symptoms\par \par 06/02/2020\par JUAN ANTONIO OSHEA a 59 year F is here today for follow up of Breast Ca and cervical cancer.\par Has been complaint with femara since 9/2015 vitamin and calcium.  Pt does not want to continue Femara while on Chemo and RT. \par Patient denies any new palpable breast lumps or pain, denies skin changes, denies nipple discharge. \par Denies vaginal bleeding, bloating, abdominal pain. Denies dysuria, fever, chills. \par She started RT today and will start weekly cisplatin today.  \par She had PET on 5/14: Focal FDG activity, proximal left lower lobe associated with approximate 8 mm branch point nodule (axial image 195, max SUV 4.2). Follow-up diagnostic CT scan 3 months time recommended. New FDG avid 8 mm left pelvic sidewall lymph node (axial 103, max SUV 9.8), suspicious for biologic tumor activity. \par CBC reviewed. \par \par 6/8/20\par Pt is here for follow up.\par She is s/p 1 weekly dose of Cisplatin which she tolerated well.\par Her only complaint is of heart burn since last week.\par In addition she has been experiencing insomnia.\par No N, V, D.\par Had mild vaginal bleeding.\par No abd pain.\par \par 6/15/20\par Pt is here for follow up.\par Is s/p 2 weekly treatments of cisplatin. Tolerating it well. No N or V.\par Had diarrhea last week which resolved spontaneously.\par No abd pain or vag bleeding.\par No fever.\par C/o insomnia, has been drinking a lot of caffeinated beverages\par \par 6/29/20\par pT is here for follow up.\par Tolerating chemo well.\par No reports of N, V. Has loose BMs occasionally well managed with Imodium.\par No vag bleeding\par \par 7/6/20\par Pt is here for follow up.\par No new complaints. Will be finishing RT tomorrow.\par No vag bleeding or pelvic pain.\par No fever, N, V, D\par compliant with femara\par \par 7/13/20- Juan Antnoio is here for for follow up. She remains on femara, Ca and Vitamin D,. she still has not got the US breast yet. She continues to smoke. She completed radiation EBRT and is planned for brachytherapy. Her appetite is good. With the last treatment she started having diarrhea, nausea which is better when she takes prn meds. Pepcid helping with reflux\par No vaginal bleeding. \par \par 9/1/2020\par Patient is here for a follow-up visit for Breast and cervical cancer.  \par Patient denies fever, chills, nausea, vomiting, new pain or bleeding.\par She continues to take Femara, Calcium and Vitamin D.  She is approaching 5 years on Femara at the end of Sept 2020.\par She is anticipating brachytherapy with Dr. Delacruz.  \par DEXA showed osteopenia in AP spine, otherwise normal.  \par Patient reports she was unable to perform 2nd brachtherapy dose due to issues with IV access but is scheduled again for later this week.\par Reviewed most recent diagnostic mammo + sono from 08/24/2020 which was BI-RADS 2: Benign.\par \par 10/13/20\par Pt is here for follow up.\par Has completed RT and chemo for cervical cancer.\par No vaginal bleeding.\par No abd pain, N, V,. D\par No breast related complaints.

## 2020-10-16 NOTE — PHYSICAL EXAM
[Fully active, able to carry on all pre-disease performance without restriction] : Status 0 - Fully active, able to carry on all pre-disease performance without restriction [Normal] : normal appearance, no rash, nodules, vesicles, ulcers, erythema [de-identified] : rt side lumpectomy scar noted. No palpable masses/ axillary LNs

## 2020-10-16 NOTE — CONSULT LETTER
[Dear  ___] : Dear  [unfilled], [Consult Letter:] : I had the pleasure of evaluating your patient, [unfilled]. [Please see my note below.] : Please see my note below. [Consult Closing:] : Thank you very much for allowing me to participate in the care of this patient.  If you have any questions, please do not hesitate to contact me. [Sincerely,] : Sincerely, [FreeTextEntry3] : Palmer Hall MD\par Professor [employer]\par Viv/Di\par Attending Physician\par [facility] [place]\par [phone]\par \par

## 2020-10-16 NOTE — ASSESSMENT
[FreeTextEntry1] : 60 yr old postmenopausal female with microinvasive hormone receptor positive breast cancer on adjuvant Femara since Sept 2015. \par \par \par 1. DCIS breast : Pt completed 5 years of hormonal therapy\par Annual mammogram to be performed again in 08/2021\par \par 2.Stage IIA1 poorly to moderately differentiated cervical squamous cell carcinoma, Per MRI results the cancer is more extensive than initially thought. Last PET done 5/2020\par She completed ChemoRT on 9/10/20\par Repeat PET scan to be done ~8 weeks post completion of RT\par CBC, CMP and Magnesium \par \par RTC in 8 weeks.

## 2020-12-07 ENCOUNTER — RESULT REVIEW (OUTPATIENT)
Age: 60
End: 2020-12-07

## 2020-12-07 ENCOUNTER — OUTPATIENT (OUTPATIENT)
Dept: OUTPATIENT SERVICES | Facility: HOSPITAL | Age: 60
LOS: 1 days | Discharge: HOME | End: 2020-12-07
Payer: MEDICAID

## 2020-12-07 DIAGNOSIS — C53.9 MALIGNANT NEOPLASM OF CERVIX UTERI, UNSPECIFIED: ICD-10-CM

## 2020-12-07 DIAGNOSIS — Z98.890 OTHER SPECIFIED POSTPROCEDURAL STATES: Chronic | ICD-10-CM

## 2020-12-07 DIAGNOSIS — Z90.49 ACQUIRED ABSENCE OF OTHER SPECIFIED PARTS OF DIGESTIVE TRACT: Chronic | ICD-10-CM

## 2020-12-07 DIAGNOSIS — Z90.89 ACQUIRED ABSENCE OF OTHER ORGANS: Chronic | ICD-10-CM

## 2020-12-07 LAB — GLUCOSE BLDC GLUCOMTR-MCNC: 98 MG/DL — SIGNIFICANT CHANGE UP (ref 70–99)

## 2020-12-07 PROCEDURE — 78815 PET IMAGE W/CT SKULL-THIGH: CPT | Mod: 26,PS

## 2020-12-16 PROBLEM — Z87.440 HISTORY OF URINARY TRACT INFECTION: Status: RESOLVED | Noted: 2018-07-30 | Resolved: 2020-12-16

## 2020-12-17 ENCOUNTER — APPOINTMENT (OUTPATIENT)
Dept: HEMATOLOGY ONCOLOGY | Facility: CLINIC | Age: 60
End: 2020-12-17
Payer: MEDICAID

## 2020-12-17 PROCEDURE — ZZZZZ: CPT

## 2020-12-17 NOTE — ASSESSMENT
[FreeTextEntry1] : 60 yr old postmenopausal female with\par \par 1.  H/O  microinvasive hormone receptor positive breast cancer s/p adjuvant with Femara from Sept 2015 - Sept 2020 x 5years, WANDA.\par UTD with follow up mammogram in 8/20\par \par \par \par 2. Stage IIA1 poorly to moderately differentiated squamous cell carcinoma of cervix ,s/p chemo/RT\par She completed ChemoRT on 9/10/20\par Repeat PET scan shows favorable response although the cervical mass is not regressed significantly, FDG neg though. Will monitor\par Small lung nodule in [place] will need follow up with rpt CT in 3 months\par \par \par Results of PET scan discussed with pt.\par S/S of recurrence discussed.\par Pt advised to follow with GYN regularly.\par RTC in  2M

## 2020-12-17 NOTE — CONSULT LETTER
[Dear  ___] : Dear  [unfilled], [Consult Letter:] : I had the pleasure of evaluating your patient, [unfilled]. [Please see my note below.] : Please see my note below. [Consult Closing:] : Thank you very much for allowing me to participate in the care of this patient.  If you have any questions, please do not hesitate to contact me. [Sincerely,] : Sincerely, [FreeTextEntry3] : Palmer Hall MD\par Professor [employer]\par Viv/Di\par Attending Physician\par [facility] [place]\par [phone]\par \par

## 2020-12-17 NOTE — HISTORY OF PRESENT ILLNESS
[Disease: _____________________] : Disease: [unfilled] [T: ___] : T[unfilled] [N: ___] : N[unfilled] [M: ___] : M[unfilled] [AJCC Stage: ____] : AJCC Stage: [unfilled] [Home] : at home, [unfilled] , at the time of the visit. [Other Location: e.g. Home (Enter Location, City,State)___] : at [unfilled] [Verbal consent obtained from patient] : the patient, [unfilled] [de-identified] : Patient is a 57 year old year-old postmenopausal female transferring her care to me. \par \par She has history of ER and [place] positive 0.6 mm well, differentiated, microinvasive ductal carcinoma  ER/NC (+), HER2 (-). status post surgery currently on adjuvant Femara started in July 2015. \par She is s/p Right NLOC/SNB/RF Spect/XOFT 06/15/15 - 0/1 (-); DCIS intermed grade, no residual IDC; (-) margins\par Patient is taking calcium and vitamin D .\par She is refusing Pap smears.\par No FHx breast/ovarian cancer.\par  [de-identified] : IDC [de-identified] : ER and [place] positive, and her-2 negative [de-identified] : < 1mm (microinvasive)\par UOQ [de-identified] : She has not been here since 11/18/16. However she states she has been compliant with Femara.\par She does not exercise.\par Her appetite is good.\par She denies any adverse events.\par \par 7/30/18:\par Doing well. Mammo in July 2018 was normal. DEXA in July 2018 showed osteopenia. \par C/o burning and pain while passing urine. Started few days ago, resolved by itself and recurred last night. Did not take any Abx for UTI. \par Compliant with Letrozole. Denies fever, nausea, vomiting, chest pain, SOB, abdominal pain, bowel problems.\par Colonoscopy done in 2017 and it was normal. \par \par 01/28/2019\par Patient is here for a follow up visit. \par C/O of weight gain and increased appetite. But otherwise tolerating letrozole well. \par Mammogram due in  july 2019. \par Denies fever, nausea, vomiting, chest pain, SOB, abdominal pain, bowel problems.\par Colonoscopy done in 2017 and it was normal. \par \par 7/8/19\par pt is here for follow up.\par Denies fever, nausea, vomiting, chest pain, SOB, abdominal pain, bowel problems.\par she had felt a lump under her left axilla, had mamamo/usg done neg as noted below.\par compliant with Femara.\par says she was diagnosed with iron deficiency last October??.\par states she was advised IV iron but has been taking po iron.\par Not taking Ca + D\par After extensive questioning pt finally admitted that she has been having vaginal spotting for several months.\par She has not seen a GYN in a long time\par \par 2/10/20\par  Patient here for follow up.  She denies any new complaints.  Patient denies any new palpable breast lumps or pain, denies skin changes, denies nipple discharge.  Patient denies cough, shortness of breath, denies fever, denies bone pain.\par compliant with letrozole.\par Still has vaginal spotting off and on.\par Did not go for USG of pelvis or GYN eval as advised previously.\par Pt states that this is not her first priority.\par Continues to smoke.\par No abdominal pain\par \par 4/13/2020: The patient is here for follow up . She remains on letrozole since July 2015. She denies fever, chills, no weight loss, no chest pain or shortness of breath . She is actively smoking. 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 She underwent conization with endometrial biopsy and vaginal exam on 3/2/2020 . 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 The patient is still reporting vaginal spotting intermittently\par \par \par 5/7/20\par Pt is here for follow up.\par She has completed MRI pelvis and was seen in Rad/Onc.\par No new symptoms\par \par 06/02/2020\par JUAN ANTONIO OSHEA a 59 year F is here today for follow up of Breast Ca and cervical cancer.\par Has been complaint with femara since 9/2015 vitamin and calcium.  Pt does not want to continue Femara while on Chemo and RT. \par Patient denies any new palpable breast lumps or pain, denies skin changes, denies nipple discharge. \par Denies vaginal bleeding, bloating, abdominal pain. Denies dysuria, fever, chills. \par She started RT today and will start weekly cisplatin today.  \par She had PET on 5/14: Focal FDG activity, proximal left lower lobe associated with approximate 8 mm branch point nodule (axial image 195, max SUV 4.2). Follow-up diagnostic CT scan 3 months time recommended. New FDG avid 8 mm left pelvic sidewall lymph node (axial 103, max SUV 9.8), suspicious for biologic tumor activity. \par CBC reviewed. \par \par 6/8/20\par Pt is here for follow up.\par She is s/p 1 weekly dose of Cisplatin which she tolerated well.\par Her only complaint is of heart burn since last week.\par In addition she has been experiencing insomnia.\par No N, V, D.\par Had mild vaginal bleeding.\par No abd pain.\par \par 6/15/20\par Pt is here for follow up.\par Is s/p 2 weekly treatments of cisplatin. Tolerating it well. No N or V.\par Had diarrhea last week which resolved spontaneously.\par No abd pain or vag bleeding.\par No fever.\par C/o insomnia, has been drinking a lot of caffeinated beverages\par \par 6/29/20\par pT is here for follow up.\par Tolerating chemo well.\par No reports of N, V. Has loose BMs occasionally well managed with Imodium.\par No vag bleeding\par \par 7/6/20\par Pt is here for follow up.\par No new complaints. Will be finishing RT tomorrow.\par No vag bleeding or pelvic pain.\par No fever, N, V, D\par compliant with femara\par \par 7/13/20- Juan Antonio is here for for follow up. She remains on femara, Ca and Vitamin D,. she still has not got the US breast yet. She continues to smoke. She completed radiation EBRT and is planned for brachytherapy. Her appetite is good. With the last treatment she started having diarrhea, nausea which is better when she takes prn meds. Pepcid helping with reflux\par No vaginal bleeding. \par \par 9/1/2020\par Patient is here for a follow-up visit for Breast and cervical cancer.  \par Patient denies fever, chills, nausea, vomiting, new pain or bleeding.\par She continues to take Femara, Calcium and Vitamin D.  She is approaching 5 years on Femara at the end of Sept 2020.\par She is anticipating brachytherapy with Dr. Delacruz.  \par DEXA showed osteopenia in AP spine, otherwise normal.  \par Patient reports she was unable to perform 2nd brachtherapy dose due to issues with IV access but is scheduled again for later this week.\par Reviewed most recent diagnostic mammo + sono from 08/24/2020 which was BI-RADS 2: Benign.\par \par 10/13/20\par Pt is here for follow up.\par Has completed RT and chemo for cervical cancer.\par No vaginal bleeding.\par No abd pain, N, V,. D\par No breast related complaints.\par \par 12/17/20\par Pt is here for follow up via teleheath.\par Has no new complaints.\par Denies vaginal bleeding, abdominal pain, N, V. D, urinary symptoms.\par Appetite is good.\par No bowel issues

## 2021-01-28 ENCOUNTER — APPOINTMENT (OUTPATIENT)
Dept: RADIATION ONCOLOGY | Facility: HOSPITAL | Age: 61
End: 2021-01-28
Payer: MEDICAID

## 2021-01-28 VITALS
SYSTOLIC BLOOD PRESSURE: 112 MMHG | HEART RATE: 104 BPM | BODY MASS INDEX: 27.92 KG/M2 | WEIGHT: 173 LBS | TEMPERATURE: 97.9 F | RESPIRATION RATE: 14 BRPM | DIASTOLIC BLOOD PRESSURE: 65 MMHG

## 2021-01-28 PROCEDURE — 99213 OFFICE O/P EST LOW 20 MIN: CPT

## 2021-01-28 RX ORDER — FOLIC ACID 0.8 MG
500 TABLET ORAL DAILY
Qty: 30 | Refills: 0 | Status: DISCONTINUED | COMMUNITY
Start: 2020-06-09 | End: 2021-01-28

## 2021-01-28 RX ORDER — OXYCODONE AND ACETAMINOPHEN 10; 325 MG/1; MG/1
10-325 TABLET ORAL
Qty: 60 | Refills: 0 | Status: DISCONTINUED | COMMUNITY
Start: 2020-06-05 | End: 2021-01-28

## 2021-01-28 RX ORDER — PROCHLORPERAZINE MALEATE 5 MG/1
5 TABLET ORAL EVERY 6 HOURS
Qty: 30 | Refills: 1 | Status: DISCONTINUED | COMMUNITY
Start: 2020-04-13 | End: 2021-01-28

## 2021-01-28 NOTE — PHYSICAL EXAM
[Normal] : oriented to person, place and time, the affect was normal, the mood was normal and not anxious [de-identified] : there are no groin nodes.  On pelvic exam, the vagina is patent.  There is no visible tumor in the vagina.  On bimanual exam there is still some resiudal cervix on palpation, but it is smooth and not suspicious for tumor.

## 2021-01-28 NOTE — PHYSICAL EXAM
[Normal] : normal heart rate and rhythm, normal S1 and S2, and no murmurs present [de-identified] : There are no ongoing skin changes on the external genitilia.  On digital vaginal exam the vagina is patent.  No tumor is noted on the cervix.  The sleeve was grasped with a forceps and was removed, the sutures had dissolved.

## 2021-01-28 NOTE — HISTORY OF PRESENT ILLNESS
[FreeTextEntry1] : 10/15/2020: Pt returns for post treatment today. The pelvis was treated with a 3D/conformal plan as IMRT was denied.  After the first HDR she refused the remaining two and so a focused boost was deliver. She completed treatment on 9/10/2020. She followed up with Dr. Hall 10/13/2020. Started using dilator 3 weeks after completing RT, 2-3 times per week. Denies vaginal bleeding or discharge. Denies urinary issues. Has occasional diarrhea, taking Imodium as needed. Was instructed to follow up with Dr. Mitchell or one of his associates.

## 2021-01-28 NOTE — PHYSICAL EXAM
[Normal] : normal heart rate and rhythm, normal S1 and S2, and no murmurs present [de-identified] : There are no ongoing skin changes on the external genitilia.  On digital vaginal exam the vagina is patent.  No tumor is noted on the cervix.  The sleeve was grasped with a forceps and was removed, the sutures had dissolved.

## 2021-01-28 NOTE — DISEASE MANAGEMENT
[Clinical] : TNM Stage: c [IB] : IB [TTNM] : 1b [NTNM] : 0 [MTNM] : 0 [de-identified] : 8660cGy [de-identified] : 8830cGy [de-identified] : Cervix

## 2021-01-28 NOTE — HISTORY OF PRESENT ILLNESS
[FreeTextEntry1] : 10/15/2020: Pt returns for post treatment today. Followed up with Dr. Hall 10/13/2020, plans to have a PET scan done end of November. Started using dilator 3 weeks after completing RT, 2-3 times per week. Denies vaginal bleeding or discharge. Denies urinary issues. Has occasional diarrhea, taking Imodium as needed.

## 2021-01-28 NOTE — DISEASE MANAGEMENT
[Clinical] : TNM Stage: c [IB] : IB [TTNM] : 1b [NTNM] : 0 [MTNM] : 0 [de-identified] : 1330cGy [de-identified] : 0860cGy [de-identified] : Cervix

## 2021-02-12 ENCOUNTER — APPOINTMENT (OUTPATIENT)
Dept: GYNECOLOGIC ONCOLOGY | Facility: CLINIC | Age: 61
End: 2021-02-12
Payer: MEDICAID

## 2021-02-12 ENCOUNTER — OUTPATIENT (OUTPATIENT)
Dept: OUTPATIENT SERVICES | Facility: HOSPITAL | Age: 61
LOS: 1 days | Discharge: HOME | End: 2021-02-12

## 2021-02-12 VITALS
HEART RATE: 102 BPM | WEIGHT: 174 LBS | DIASTOLIC BLOOD PRESSURE: 62 MMHG | SYSTOLIC BLOOD PRESSURE: 122 MMHG | HEIGHT: 66 IN | RESPIRATION RATE: 14 BRPM | BODY MASS INDEX: 27.97 KG/M2

## 2021-02-12 DIAGNOSIS — Z90.89 ACQUIRED ABSENCE OF OTHER ORGANS: Chronic | ICD-10-CM

## 2021-02-12 DIAGNOSIS — Z98.890 OTHER SPECIFIED POSTPROCEDURAL STATES: Chronic | ICD-10-CM

## 2021-02-12 DIAGNOSIS — Z90.49 ACQUIRED ABSENCE OF OTHER SPECIFIED PARTS OF DIGESTIVE TRACT: Chronic | ICD-10-CM

## 2021-02-12 PROCEDURE — 99214 OFFICE O/P EST MOD 30 MIN: CPT

## 2021-02-12 NOTE — ASSESSMENT
[FreeTextEntry1] : 60 with hx of cervical cancer stage 1B s/p chemoRT presents for surveillance, doing well today\par -No evidence of recurrent disease\par -Continue follow up with Rad/onc, encouraged use of dilators. \par -Recurrence signs and symptoms discussed. \par -f/u pap smear from today.\par RTC in 6 months for for surveillance (alternating with rad onc)

## 2021-02-12 NOTE — PHYSICAL EXAM
[Normal] : General appearance: No acute distress, well appearing and well nourished [de-identified] : soft, non tender, non distended, no palpable masses [de-identified] : Unable to do speculum exam due to pain from previous radiation therapy. Pelvic exam - no abnormal lesions or masses palpated, no bleeding or discharge.

## 2021-02-12 NOTE — HISTORY OF PRESENT ILLNESS
[FreeTextEntry1] : 61 yo presents for follow up for surveillance. Doing well today. Reports vaginal spotting intermittently. Stage 1B cervical cancer, was not a surgical candidate at that time, and due to COVID pandemic, decision was made for chemo radiation. Denies fevers, chills, weight loss appetite loss, CP, SOB, abd pain, N/V/D, dysuria. She never presented for post-op follow up last year, has not been seen since at GYN ONC 3/2020 but following consistently with rad/onc and heme/onc with hx of ER/CO positive ductal carcinoma and cervical cancer.\par \par Dx: Stage 1B poorly differentiated SCC of cervix\par Surgery: Cervical conization and EMBx on 3/2/2020\par Adjuvant: s/p radiation therapy 9/2020\par \par Last pap: 2/2020: ASCUS, HPV pos\par Last mammogram:8/2020: BIRADS2\par Last colonoscopy: unsure\par \par Imaging\par PET CT (12/2020): 61 yo presents for follow up for surveillance. Doing well today. Reports vaginal spotting intermittently. Stage 1B cervical cancer, was not a surgical candidate at that time, and due to COVID pandemic, decision was made for chemo radiation. Denies fevers, chills, weightloss appetite loss, CP, SOB, abd pain, N/V/D, dysuria. She never presented for post-op follow up last year, has not been seen since at GYN ON[facility] 3/2020 but following consistently with rad/onc and heme/onc with hx of ER/CO positive ductal carcinoma and cervical cancer.\par \par Dx: Stage 1B poorly differentiaed SCC of cervix\par Surgery: Cervical conization and EMBx on 3/2/2020\par Adjuvent: none

## 2021-02-19 LAB — CYTOLOGY CVX/VAG DOC THIN PREP: ABNORMAL

## 2021-02-23 ENCOUNTER — OUTPATIENT (OUTPATIENT)
Dept: OUTPATIENT SERVICES | Facility: HOSPITAL | Age: 61
LOS: 1 days | Discharge: HOME | End: 2021-02-23

## 2021-02-23 ENCOUNTER — APPOINTMENT (OUTPATIENT)
Dept: HEMATOLOGY ONCOLOGY | Facility: CLINIC | Age: 61
End: 2021-02-23
Payer: MEDICAID

## 2021-02-23 VITALS
DIASTOLIC BLOOD PRESSURE: 76 MMHG | WEIGHT: 175 LBS | BODY MASS INDEX: 28.12 KG/M2 | HEIGHT: 66 IN | TEMPERATURE: 98.6 F | SYSTOLIC BLOOD PRESSURE: 130 MMHG | HEART RATE: 103 BPM

## 2021-02-23 DIAGNOSIS — Z90.89 ACQUIRED ABSENCE OF OTHER ORGANS: Chronic | ICD-10-CM

## 2021-02-23 DIAGNOSIS — Z90.49 ACQUIRED ABSENCE OF OTHER SPECIFIED PARTS OF DIGESTIVE TRACT: Chronic | ICD-10-CM

## 2021-02-23 DIAGNOSIS — Z98.890 OTHER SPECIFIED POSTPROCEDURAL STATES: Chronic | ICD-10-CM

## 2021-02-23 PROCEDURE — 99213 OFFICE O/P EST LOW 20 MIN: CPT

## 2021-03-01 DIAGNOSIS — D05.11 INTRADUCTAL CARCINOMA IN SITU OF RIGHT BREAST: ICD-10-CM

## 2021-03-01 DIAGNOSIS — C50.411 MALIGNANT NEOPLASM OF UPPER-OUTER QUADRANT OF RIGHT FEMALE BREAST: ICD-10-CM

## 2021-03-01 DIAGNOSIS — C53.9 MALIGNANT NEOPLASM OF CERVIX UTERI, UNSPECIFIED: ICD-10-CM

## 2021-03-01 NOTE — PHYSICAL EXAM
[Fully active, able to carry on all pre-disease performance without restriction] : Status 0 - Fully active, able to carry on all pre-disease performance without restriction [Normal] : affect appropriate [de-identified] : Right breast lumpectomy scar noted, bilateral axilla without adenopathy

## 2021-03-01 NOTE — HISTORY OF PRESENT ILLNESS
[Disease: _____________________] : Disease: [unfilled] [T: ___] : T[unfilled] [N: ___] : N[unfilled] [M: ___] : M[unfilled] [AJCC Stage: ____] : AJCC Stage: [unfilled] [de-identified] : Patient is a 60 year old year-old postmenopausal female transferring her care to me. \par \par She has history of ER and MI positive 0.6 mm well, differentiated, microinvasive ductal carcinoma  ER/MI (+), HER2 (-). status post surgery currently on adjuvant Femara started in July 2015. \par She is s/p Right NLOC/SNB/RF Spect/XOFT 06/15/15 - 0/1 (-); DCIS intermed grade, no residual IDC; (-) margins\par Patient is taking calcium and vitamin D .\par She is refusing Pap smears.\par No FHx breast/ovarian cancer.\par  [de-identified] : IDC [de-identified] : ER and [place] positive, and her-2 negative [de-identified] : < 1mm (microinvasive)\par UOQ [de-identified] : She has not been here since 11/18/16. However she states she has been compliant with Femara.\par She does not exercise.\par Her appetite is good.\par She denies any adverse events.\par \par 7/30/18:\par Doing well. Mammo in July 2018 was normal. DEXA in July 2018 showed osteopenia. \par C/o burning and pain while passing urine. Started few days ago, resolved by itself and recurred last night. Did not take any Abx for UTI. \par Compliant with Letrozole. Denies fever, nausea, vomiting, chest pain, SOB, abdominal pain, bowel problems.\par Colonoscopy done in 2017 and it was normal. \par \par 01/28/2019\par Patient is here for a follow up visit. \par C/O of weight gain and increased appetite. But otherwise tolerating letrozole well. \par Mammogram due in  july 2019. \par Denies fever, nausea, vomiting, chest pain, SOB, abdominal pain, bowel problems.\par Colonoscopy done in 2017 and it was normal. \par \par 7/8/19\par pt is here for follow up.\par Denies fever, nausea, vomiting, chest pain, SOB, abdominal pain, bowel problems.\par she had felt a lump under her left axilla, had mamamo/usg done neg as noted below.\par compliant with Femara.\par says she was diagnosed with iron deficiency last October??.\par states she was advised IV iron but has been taking po iron.\par Not taking Ca + D\par After extensive questioning pt finally admitted that she has been having vaginal spotting for several months.\par She has not seen a GYN in a long time\par \par 2/10/20\par  Patient here for follow up.  She denies any new complaints.  Patient denies any new palpable breast lumps or pain, denies skin changes, denies nipple discharge.  Patient denies cough, shortness of breath, denies fever, denies bone pain.\par compliant with letrozole.\par Still has vaginal spotting off and on.\par Did not go for USG of pelvis or GYN eval as advised previously.\par Pt states that this is not her first priority.\par Continues to smoke.\par No abdominal pain\par \par 4/13/2020: The patient is here for follow up . She remains on letrozole since July 2015. She denies fever, chills, no weight loss, no chest pain or shortness of breath . She is actively smoking. 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 She underwent conization with endometrial biopsy and vaginal exam on 3/2/2020 . 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 The patient is still reporting vaginal spotting intermittently\par \par \par 5/7/20\par Pt is here for follow up.\par She has completed MRI pelvis and was seen in Rad/Onc.\par No new symptoms\par \par 06/02/2020\par JUAN ANTONIO OSHEA a 59 year F is here today for follow up of Breast Ca and cervical cancer.\par Has been complaint with femara since 9/2015 vitamin and calcium.  Pt does not want to continue Femara while on Chemo and RT. \par Patient denies any new palpable breast lumps or pain, denies skin changes, denies nipple discharge. \par Denies vaginal bleeding, bloating, abdominal pain. Denies dysuria, fever, chills. \par She started RT today and will start weekly cisplatin today.  \par She had PET on 5/14: Focal FDG activity, proximal left lower lobe associated with approximate 8 mm branch point nodule (axial image 195, max SUV 4.2). Follow-up diagnostic CT scan 3 months time recommended. New FDG avid 8 mm left pelvic sidewall lymph node (axial 103, max SUV 9.8), suspicious for biologic tumor activity. \par CBC reviewed. \par \par 6/8/20\par Pt is here for follow up.\par She is s/p 1 weekly dose of Cisplatin which she tolerated well.\par Her only complaint is of heart burn since last week.\par In addition she has been experiencing insomnia.\par No N, V, D.\par Had mild vaginal bleeding.\par No abd pain.\par \par 6/15/20\par Pt is here for follow up.\par Is s/p 2 weekly treatments of cisplatin. Tolerating it well. No N or V.\par Had diarrhea last week which resolved spontaneously.\par No abd pain or vag bleeding.\par No fever.\par C/o insomnia, has been drinking a lot of caffeinated beverages\par \par 6/29/20\par pT is here for follow up.\par Tolerating chemo well.\par No reports of N, V. Has loose BMs occasionally well managed with Imodium.\par No vag bleeding\par \par 7/6/20\par Pt is here for follow up.\par No new complaints. Will be finishing RT tomorrow.\par No vag bleeding or pelvic pain.\par No fever, N, V, D\par compliant with femara\par \par 7/13/20- Juan Antonio is here for for follow up. She remains on femara, Ca and Vitamin D,. she still has not got the US breast yet. She continues to smoke. She completed radiation EBRT and is planned for brachytherapy. Her appetite is good. With the last treatment she started having diarrhea, nausea which is better when she takes prn meds. Pepcid helping with reflux\par No vaginal bleeding. \par \par 9/1/2020\par Patient is here for a follow-up visit for Breast and cervical cancer.  \par Patient denies fever, chills, nausea, vomiting, new pain or bleeding.\par She continues to take Femara, Calcium and Vitamin D.  She is approaching 5 years on Femara at the end of Sept 2020.\par She is anticipating brachytherapy with Dr. Delacruz.  \par DEXA showed osteopenia in AP spine, otherwise normal.  \par Patient reports she was unable to perform 2nd brachtherapy dose due to issues with IV access but is scheduled again for later this week.\par Reviewed most recent diagnostic mammo + sono from 08/24/2020 which was BI-RADS 2: Benign.\par \par 10/13/20\par Pt is here for follow up.\par Has completed RT and chemo for cervical cancer.\par No vaginal bleeding.\par No abd pain, N, V,. D\par No breast related complaints.\par \par 12/17/20\par Pt is here for follow up via teleheath.\par Has no new complaints.\par Denies vaginal bleeding, abdominal pain, N, V. D, urinary symptoms.\par Appetite is good.\par No bowel issues\par \par 2/23/2021\par Patient is here to follow up for breast cancer and cervical cancer\par She is feeling well today, denies any new breast lump or pain, skin changes or nipple discharge\par She denies abdominal pain, bloating, nausea, vomiting, vaginal bleeding or discharge\par She completed RT in September 2020\par She is updated with mammogram and bone density

## 2021-03-01 NOTE — END OF VISIT
[FreeTextEntry3] : I was physically present for the key portions of the evaluation and management service provided. I agree with the history and physical, and plan which I have reviewed and edited where appropriate.\par

## 2021-03-01 NOTE — ASSESSMENT
[FreeTextEntry1] : 60 yr old postmenopausal female with\par \par 1.  H/O  microinvasive hormone receptor positive breast cancer s/p adjuvant with Femara from Sept 2015 - Sept 2020 x 5years, WANDA.\par UTD with follow up mammogram in 8/20\par \par 2. Stage IIA1 poorly to moderately differentiated squamous cell carcinoma of cervix ,s/p chemo/RT\par She completed ChemoRT on 9/10/20\par Repeat PET scan shows favorable response although the cervical mass is not regressed significantly, FDG neg though. Will monitor\par Small lung nodule in [place] will need follow up with rpt CT in 3 months\par \par \par \par Previous notes and lab results reviewed by Dr. Hall prior to the visit and explained to the patient.\par \par PLAN:\par Signs and symptoms of recurrence discussed\par Advised to follow with GYN regularly.\par \par RTC in  3 months\par \par Case was seen and discussed with Dr. Hall who agreed with assessment and plan.\par

## 2021-03-01 NOTE — CONSULT LETTER
[Dear  ___] : Dear  [unfilled], [Consult Letter:] : I had the pleasure of evaluating your patient, [unfilled]. [Please see my note below.] : Please see my note below. [Consult Closing:] : Thank you very much for allowing me to participate in the care of this patient.  If you have any questions, please do not hesitate to contact me. [Sincerely,] : Sincerely, [FreeTextEntry3] : Palmer Hall MD\par Professor [employer]\par Viv/Di\par Attending Physician\par [facility] [place]\par [phone]\par \par

## 2021-03-01 NOTE — RESULTS/DATA
[FreeTextEntry1] : EXAM:  PETCT SKUL-THI ONC FDG SUBS\par PROCEDURE DATE:  12/07/2020\par \par INTERPRETATION:  Reason for study: Cervical cancer\par FDG PET CT STUDY   subsequent  treatment strategy\par REASON: TUMOR IMAGING - PET with concurrently acquired CT for attenuation correction and anatomic localization; skull base to mid - thigh .\par \par CPT code 10794.\par \par Fasting blood glucose level:     98 mg/dl\par \par HISTORY: 61 yo with previous history of cervical Ca. S/P Chemo (July August 20, 2020)/ RT. (September 24, 2020). Assess disease response.\par Prior history of breast cancer (2015).\par \par TECHNIQUE: Approximately 45 minutes after the intravenous administration of 10.7 mCi 18-Fluorine FDG, whole body PET images were acquired from base of skull to mid - thigh.\par \par COMPARISON: PT/CT WHOLE BODY 5/14/2020, with the following\par impression:\par Since: May 2015, Interval development of approximate 3.8 cm FDG avid cervical mass with Max SUV 21.3, consistent with patient's known cervical cancer.\par New FDG avid 8 mm left pelvic sidewall lymph node (axial 103, max SUV 9.8), suspicious for biologic tumor activity.\par Bilateral perihilar bronchial thickening with symmetric FDG activity (similar to earlier study).\par Focal FDG activity, proximal left lower lobe associated with approximate 8 mm branch point nodule (axial image 195, max SUV 4.2). Follow-up diagnostic CT scan 3 months time recommended\par \par Linear activity distal esophagus, most consistent with nonspecific esophagitis .\par CT protocol used for this PET/CT study is designed for attenuation correction and anatomic localization of PET findings.  This  CT is not designed to replace state-of-the-art diagnostic CT scans for specific imaging protocols of different body parts.\par \par FINDINGS:\par Head/Neck: No biologically active head/neck lesions.\par Normal uptake within the brain, pharyngeal lymphoid tissue, salivary glands and laryngeal musculature is noted.\par Thorax:   No suspicious hypermetabolic mediastinal, hilar, lung parenchymal lesions.\par Linear esophageal activity consistent with nonspecific esophagitis.\par Previous focal activity within the proximal left lower lobe is no longer identified.\par Minimal FDG activity within approximate 5 mm branching nodule left upper lobe new findings from May 14, 2020 (axial image #224).\par Previous mild bilateral hilar FDG activity associated with peribronchial thickening has diminished.\par Abdomen/Pelvis: Physiologic GI/  activity. Previous approximate 3.8 cm cervical mass is decreased in size (approximately 3.1 cm) and is no longer FDG avid (axial image 86).\par No FDG avid dwayne disease. Earlier FDG avid 8 mm left pelvic sidewall lymph node is no longer present.\par Musculoskeletal :  No suspicious hypermetabolic osseous lesions.\par \par Additional CT findings:\par Postcholecystectomy.\par Stable degenerative changes lower lumbar spine\par Near complete resolution of previously opacified right maxillary sinus.\par Decreased size of previous hiatal hernia.\par Upper lobe emphysematous changes with scarring.\par \par IMPRESSION:\par Since: May 14, 2020\par Previous approximate 3.8 cm cervical mass is significantly decreased in size (approximately 3.1 cm) and is no longer FDG avid (axial image 86).\par No FDG avid dwayne disease. Earlier FDG avid 8 mm left pelvic sidewall lymph node is no longer present.\par Minimal new FDG activity on attenuated corrected image (SUV 1.3) within approximate 5 mm branching nodule left upper lobe(axial image #224). Consider diagnostic CT scan of the thorax in 3 months time to assess stability.\par Resolution focal activity,  left lower lobe,  lung.\par \par KYLE ALVARADO MD; Attending Radiologist\par This document has been electronically signed. Dec  7 2020 10:54AM\par   [id number]\par \par  \par EXAM:  XR BONE DENSITY AXIAL\par PROCEDURE DATE:  08/24/2020\par \par \par \par =================================================================\par                        Bone Density Report\par =================================================================\par \par Age:           60\par Sex:           Female\par Ethnicity:     White\par \par -----------------------------------------------------------------\par \par Indication: osteopenia;\par \par Accession number: [id number]\par \par Bone Density:\par -----------------------------------------------------------------\par Region                   BMD    T-score  Z-score   Classification\par -----------------------------------------------------------------\par AP Spine (L1, L2, L3)    0.869   -1.4      0.2     Osteopenia\par Femoral Neck (Left)      0.755   -0.9      0.5     Normal\par Total Hip (Left)         0.828   -0.9      0.1     Normal\par -----------------------------------------------------------------\par \par World Health Organization criteria for BMD impression\par classify patients as:\par Normal (T-score at or above -1.0),\par Osteopenia (T-score between -1.0 and -2.5), or\par Osteoporosis (T-score at or below -2.5).\par \par 10-year Fracture Risk(1):\par -----------------------------------------------------------------\par Major Osteoporotic Fracture            7.2%\par Hip Fracture                           0.7%\par -----------------------------------------------------------------\par Reported Risk Factors:\par [place] (), Neck BMD=0.755, BMI=27.8, smoking\par (1) FRAX(R) Version 3.05. Fracture probability calculated for an\par untreated patient. Fracture probability may be lower if the\par patient has received treatment.\par \par \par Previous Exams:\par -----------------------------------------------------------------\par Region  Exam     Age  BMD   T-score  BMD Change     BMD Change\par         Date          g/cm2          vs Baseline    vs Previous\par -----------------------------------------------------------------\par AP Spine(L1, L2, L3)\par      08/24/2020  60  0.869    -1.4      2.8%*          2.8%*\par      07/10/2018  57  0.845    -1.6\par \par Total Hip(Left)\par      08/24/2020  60  0.828    -0.9      -6.6%*         -6.6%*\par      07/10/2018  57  0.887    -0.5\par \par Femoral Neck(Left)\par      08/24/2020  60  0.755    -0.9      -4.5%*         -4.5%*\par      07/10/2018  57  0.790    -0.5\par -----------------------------------------------------------------\par *Denotes significance at 95% confidence level, LSC for AP Spine\par = 0.022 g/cm2,  LSC for Total Hip = 0.027 g/cm2\par \par Clinical Information Provided by Patient:\par -----------------------------------------------------------------\par \par Reason for Referral: d05.11\par Smokes\par Menopause Age: 46\par   postmenopausal\par -----------------------------------------------------------------\par \par Impression: The patient has low bone mass, with the lowest\par measured bone density in the AP Spine. The patient has an\par estimated ten-year risk of hip fracture of 0.7% and an\par estimated ten-year risk of major fracture of 7.2%, based on the\par WHO FRAX algorithm. The patient has risk factors, including:\par smoking. The BMD for the Total Hip(Left) decreased, changing by\par -6.6% since the last DXA exam. The BMD for the Femoral\par Neck(Left) decreased, changing by -4.5% since the last DXA\par exam.\par \par Discussion: BONE DENSITY IS LOW AT ONE OR MORE SKELETAL SITES.\par This patient's lowest T-score is low at one or more skeletal\par sites.  It meets the World Health Organization's (WHO) criteria\par for "low bone mass" (T-score between -1.0 and -2.5).\par The patient's 10-year risk of fracture as calculated by FRAX is\par less than the threshold where pharmacological therapy is\par recommended by the National Osteoporosis Foundation (NOF).\par However, all treatment decisions require clinical judgment and\par consideration of individual patient factors, including patient\par preferences, comorbidities, previous drug use, risk factors not\par captured in the FRAX model (e.g., frailty, falls, vitamin D\par deficiency, increased bone turnover, interval significant\par decline in bone density) and possible under or overestimation\par of fracture risk by FRAX. The patient should follow a healthful\par lifestyle (good nutrition with adequate calcium and vitamin D,\par and appropriate weight-bearing exercise).\par \par Follow-Up: Consider repeating this study in 2 years to reassess\par this patient's status, or sooner if there is some new clinical\par indication.\par \par Diagnosis: M89.9  F17.200  Z78.0  Z13.820\par \par Reported by: Marcus Leone MD, FACP, CCD on 08/24/2020\par 2:04:00 PM.\par MARCUS LEONE MD\par This document has been electronically signed. Aug 24 2020  2:06PM\par \par \par EXAM:  US BREAST LIMITED LT\par EXAM:  MG MAMMO DIAG W DARREN BI#\par PROCEDURE DATE:  08/24/2020\par \par INTERPRETATION:  Clinical History / Reason for exam: Screening right breast. Follow-up left breast mass.\par \par Additional history: Right breast cancer status post lumpectomy at age 54.\par \par The patient reports her last clinical breast examination was performed about twelve months ago.\par \par Family history: There is no family history of breast cancer\par \par Comparisons: Mammograms dating back to 2016.\par \par Views obtained: bilateral full field digital 2D and digital tomosynthesis images.\par \par Computer-aided detection was utilized in the interpretation of this examination.\par \par Breast composition: There are scattered areas of fibroglandular density.\par \par Findings:\par \par Mammogram:\par \par No suspicious mass, microcalcifications or areas of architectural distortion is seen either breast. There is redemonstration of area of architectural distortion with evidence of fat necrosis at the lumpectomy site in the right breast.\par \par Ultrasound:\par \par Targeted unilateral left breast ultrasound was performed.\par \par The prior noted hypoechoic mass in the left axilla is not visualized on the current study which may be related to fluctuating cyst versus resolved sebaceous cyst.\par \par Impression: No mammographic or sonographic evidence of malignancy.\par \par Recommendation: Unless otherwise indicated by clinical findings, annual screening mammography recommended.\par \par BI-RADS Category 2: Benign\par \par The above findings and recommendations were discussed with the patient at the time of the examination.\par \par BONNIE POWER M.D., ATTENDING RADIOLOGIST\par This document has been electronically signed. Aug 24 2020 12:51PM\par \par \par \par \par \par

## 2021-03-04 DIAGNOSIS — C53.9 MALIGNANT NEOPLASM OF CERVIX UTERI, UNSPECIFIED: ICD-10-CM

## 2021-06-22 ENCOUNTER — APPOINTMENT (OUTPATIENT)
Dept: HEMATOLOGY ONCOLOGY | Facility: CLINIC | Age: 61
End: 2021-06-22
Payer: MEDICAID

## 2021-06-22 ENCOUNTER — LABORATORY RESULT (OUTPATIENT)
Age: 61
End: 2021-06-22

## 2021-06-22 ENCOUNTER — OUTPATIENT (OUTPATIENT)
Dept: OUTPATIENT SERVICES | Facility: HOSPITAL | Age: 61
LOS: 1 days | Discharge: HOME | End: 2021-06-22

## 2021-06-22 VITALS
SYSTOLIC BLOOD PRESSURE: 125 MMHG | WEIGHT: 174 LBS | HEART RATE: 99 BPM | HEIGHT: 66 IN | DIASTOLIC BLOOD PRESSURE: 68 MMHG | BODY MASS INDEX: 27.97 KG/M2 | TEMPERATURE: 97.4 F | RESPIRATION RATE: 14 BRPM

## 2021-06-22 DIAGNOSIS — Z98.890 OTHER SPECIFIED POSTPROCEDURAL STATES: Chronic | ICD-10-CM

## 2021-06-22 DIAGNOSIS — Z90.49 ACQUIRED ABSENCE OF OTHER SPECIFIED PARTS OF DIGESTIVE TRACT: Chronic | ICD-10-CM

## 2021-06-22 DIAGNOSIS — Z90.89 ACQUIRED ABSENCE OF OTHER ORGANS: Chronic | ICD-10-CM

## 2021-06-22 PROCEDURE — 99213 OFFICE O/P EST LOW 20 MIN: CPT

## 2021-06-23 LAB
ALBUMIN SERPL ELPH-MCNC: 4.4 G/DL
ALP BLD-CCNC: 105 U/L
ALT SERPL-CCNC: 14 U/L
ANION GAP SERPL CALC-SCNC: 11 MMOL/L
AST SERPL-CCNC: 17 U/L
BILIRUB SERPL-MCNC: <0.2 MG/DL
BUN SERPL-MCNC: 12 MG/DL
CALCIUM SERPL-MCNC: 9.7 MG/DL
CHLORIDE SERPL-SCNC: 105 MMOL/L
CO2 SERPL-SCNC: 26 MMOL/L
CREAT SERPL-MCNC: 0.7 MG/DL
GLUCOSE SERPL-MCNC: 82 MG/DL
HCT VFR BLD CALC: 42.8 %
HGB BLD-MCNC: 13.4 G/DL
MCHC RBC-ENTMCNC: 28.5 PG
MCHC RBC-ENTMCNC: 31.3 G/DL
MCV RBC AUTO: 90.9 FL
PLATELET # BLD AUTO: 360 K/UL
PMV BLD: 9.9 FL
POTASSIUM SERPL-SCNC: 4.7 MMOL/L
PROT SERPL-MCNC: 6.8 G/DL
RBC # BLD: 4.71 M/UL
RBC # FLD: 13.7 %
SODIUM SERPL-SCNC: 142 MMOL/L
WBC # FLD AUTO: 6.11 K/UL

## 2021-06-25 NOTE — CONSULT LETTER
[Dear  ___] : Dear  [unfilled], [Consult Letter:] : I had the pleasure of evaluating your patient, [unfilled]. [Please see my note below.] : Please see my note below. [Consult Closing:] : Thank you very much for allowing me to participate in the care of this patient.  If you have any questions, please do not hesitate to contact me. [Sincerely,] : Sincerely, [FreeTextEntry3] : Palmer Hall MD\par Professor [employer]\par Viv/Di\par Attending Physician\par [facility] [place]\par [phone]\par \par

## 2021-06-25 NOTE — PHYSICAL EXAM
[Fully active, able to carry on all pre-disease performance without restriction] : Status 0 - Fully active, able to carry on all pre-disease performance without restriction [Normal] : affect appropriate [de-identified] : Right breast lumpectomy scar noted, bilateral axilla without adenopathy

## 2021-06-25 NOTE — HISTORY OF PRESENT ILLNESS
[Disease: _____________________] : Disease: [unfilled] [T: ___] : T[unfilled] [N: ___] : N[unfilled] [M: ___] : M[unfilled] [AJCC Stage: ____] : AJCC Stage: [unfilled] [de-identified] : Patient is a 60 year old year-old postmenopausal female transferring her care to me. \par \par She has history of ER and MD positive 0.6 mm well, differentiated, microinvasive ductal carcinoma  ER/MD (+), HER2 (-). status post surgery currently on adjuvant Femara started in July 2015. \par She is s/p Right NLOC/SNB/RF Spect/XOFT 06/15/15 - 0/1 (-); DCIS intermed grade, no residual IDC; (-) margins\par Patient is taking calcium and vitamin D .\par She is refusing Pap smears.\par No FHx breast/ovarian cancer.\par  [de-identified] : IDC [de-identified] : ER and ND positive, and her-2 negative [de-identified] : < 1mm (microinvasive)\par UOQ [de-identified] : She has not been here since 11/18/16. However she states she has been compliant with Femara.\par She does not exercise.\par Her appetite is good.\par She denies any adverse events.\par \par 7/30/18:\par Doing well. Mammo in July 2018 was normal. DEXA in July 2018 showed osteopenia. \par C/o burning and pain while passing urine. Started few days ago, resolved by itself and recurred last night. Did not take any Abx for UTI. \par Compliant with Letrozole. Denies fever, nausea, vomiting, chest pain, SOB, abdominal pain, bowel problems.\par Colonoscopy done in 2017 and it was normal. \par \par 01/28/2019\par Patient is here for a follow up visit. \par C/O of weight gain and increased appetite. But otherwise tolerating letrozole well. \par Mammogram due in  july 2019. \par Denies fever, nausea, vomiting, chest pain, SOB, abdominal pain, bowel problems.\par Colonoscopy done in 2017 and it was normal. \par \par 7/8/19\par pt is here for follow up.\par Denies fever, nausea, vomiting, chest pain, SOB, abdominal pain, bowel problems.\par she had felt a lump under her left axilla, had mamamo/usg done neg as noted below.\par compliant with Femara.\par says she was diagnosed with iron deficiency last October??.\par states she was advised IV iron but has been taking po iron.\par Not taking Ca + D\par After extensive questioning pt finally admitted that she has been having vaginal spotting for several months.\par She has not seen a GYN in a long time\par \par 2/10/20\par  Patient here for follow up.  She denies any new complaints.  Patient denies any new palpable breast lumps or pain, denies skin changes, denies nipple discharge.  Patient denies cough, shortness of breath, denies fever, denies bone pain.\par compliant with letrozole.\par Still has vaginal spotting off and on.\par Did not go for USG of pelvis or GYN eval as advised previously.\par Pt states that this is not her first priority.\par Continues to smoke.\par No abdominal pain\par \par 4/13/2020: The patient is here for follow up . She remains on letrozole since July 2015. She denies fever, chills, no weight loss, no chest pain or shortness of breath . She is actively smoking. 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 She underwent conization with endometrial biopsy and vaginal exam on 3/2/2020 . 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 The patient is still reporting vaginal spotting intermittently\par \par \par 5/7/20\par Pt is here for follow up.\par She has completed MRI pelvis and was seen in Rad/Onc.\par No new symptoms\par \par 06/02/2020\par JUAN ANTONIO OSHEA a 59 year F is here today for follow up of Breast Ca and cervical cancer.\par Has been complaint with femara since 9/2015 vitamin and calcium.  Pt does not want to continue Femara while on Chemo and RT. \par Patient denies any new palpable breast lumps or pain, denies skin changes, denies nipple discharge. \par Denies vaginal bleeding, bloating, abdominal pain. Denies dysuria, fever, chills. \par She started RT today and will start weekly cisplatin today.  \par She had PET on 5/14: Focal FDG activity, proximal left lower lobe associated with approximate 8 mm branch point nodule (axial image 195, max SUV 4.2). Follow-up diagnostic CT scan 3 months time recommended. New FDG avid 8 mm left pelvic sidewall lymph node (axial 103, max SUV 9.8), suspicious for biologic tumor activity. \par CBC reviewed. \par \par 6/8/20\par Pt is here for follow up.\par She is s/p 1 weekly dose of Cisplatin which she tolerated well.\par Her only complaint is of heart burn since last week.\par In addition she has been experiencing insomnia.\par No N, V, D.\par Had mild vaginal bleeding.\par No abd pain.\par \par 6/15/20\par Pt is here for follow up.\par Is s/p 2 weekly treatments of cisplatin. Tolerating it well. No N or V.\par Had diarrhea last week which resolved spontaneously.\par No abd pain or vag bleeding.\par No fever.\par C/o insomnia, has been drinking a lot of caffeinated beverages\par \par 6/29/20\par pT is here for follow up.\par Tolerating chemo well.\par No reports of N, V. Has loose BMs occasionally well managed with Imodium.\par No vag bleeding\par \par 7/6/20\par Pt is here for follow up.\par No new complaints. Will be finishing RT tomorrow.\par No vag bleeding or pelvic pain.\par No fever, N, V, D\par compliant with femara\par \par 7/13/20- Juan Antonio is here for for follow up. She remains on femara, Ca and Vitamin D,. she still has not got the US breast yet. She continues to smoke. She completed radiation EBRT and is planned for brachytherapy. Her appetite is good. With the last treatment she started having diarrhea, nausea which is better when she takes prn meds. Pepcid helping with reflux\par No vaginal bleeding. \par \par 9/1/2020\par Patient is here for a follow-up visit for Breast and cervical cancer.  \par Patient denies fever, chills, nausea, vomiting, new pain or bleeding.\par She continues to take Femara, Calcium and Vitamin D.  She is approaching 5 years on Femara at the end of Sept 2020.\par She is anticipating brachytherapy with Dr. Delacruz.  \par DEXA showed osteopenia in AP spine, otherwise normal.  \par Patient reports she was unable to perform 2nd brachtherapy dose due to issues with IV access but is scheduled again for later this week.\par Reviewed most recent diagnostic mammo + sono from 08/24/2020 which was BI-RADS 2: Benign.\par \par 10/13/20\par Pt is here for follow up.\par Has completed RT and chemo for cervical cancer.\par No vaginal bleeding.\par No abd pain, N, V,. D\par No breast related complaints.\par \par 12/17/20\par Pt is here for follow up via teleheath.\par Has no new complaints.\par Denies vaginal bleeding, abdominal pain, N, V. D, urinary symptoms.\par Appetite is good.\par No bowel issues\par \par 2/23/2021\par Patient is here to follow up for breast cancer and cervical cancer\par She is feeling well today, denies any new breast lump or pain, skin changes or nipple discharge\par She denies abdominal pain, bloating, nausea, vomiting, vaginal bleeding or discharge\par She completed RT in September 2020\par She is updated with mammogram and bone density\par \par 6/22/021\par Pt is here to follow up for breast and cervical cancer\par She feels well today. appetite is good\par She denies any new breast lump/mass, skin changes or nipple discharge, no abdominal pain, bloating, vaginal discharge or bleeding\par She received 2 doses of COVID vaccine\par She is UTD with mammo and gyne

## 2021-06-25 NOTE — ASSESSMENT
[FreeTextEntry1] : 60 year old postmenopausal female with:\par 1) Microinvasive hormone receptor positive breast cancer, S/P adjuvant therapy with Femara x 5 years, 9/2015 - 9/2020, [facility]\par 2) Stage IIA1 Poorly to Moderately Differentiated Squamous Cell carcinoma of cervix, S/P chemo/RT on 9/2020\par \par PLAN:\par Previous notes reviewed and all relevant laboratory and radiology results discussed with dr mosher and communicated to the patient.\par \par #H/O  microinvasive hormone receptor positive breast cancer\par - Last mammogram in 8/2020 was benign\par - She is due for mammogram on 8/2021 - script given\par - Signs and symptoms of disease recurrence discussed with patient\par \par #Stage IIA1 poorly to moderately differentiated squamous cell carcinoma of cervix\par - Repeat PET scan (12/7/2020) showed favorable response although the cervical mass is not regressed significantly, FDG neg though. \par - Will do CBC, CMP today. Will call patient with the results\par - Follow up with gyne as scheduled\par \par RTC in  4 months\par \par Case was seen and discussed with Dr. Mosher who agreed with assessment and plan.\par

## 2021-06-25 NOTE — RESULTS/DATA
[FreeTextEntry1] : EXAM:  PETCT SKUL-THI ONC FDG SUBS\par PROCEDURE DATE:  12/07/2020\par \par INTERPRETATION:  Reason for study: Cervical cancer\par FDG PET CT STUDY   subsequent  treatment strategy\par REASON: TUMOR IMAGING - PET with concurrently acquired CT for attenuation correction and anatomic localization; skull base to mid - thigh .\par \par CPT code 27565.\par Fasting blood glucose level:     98 mg/dl\par \par HISTORY: 61 yo with previous history of cervical Ca. S/P Chemo (July August 20, 2020)/ RT. (September 24, 2020). Assess disease response.\par Prior history of breast cancer (2015).\par \par TECHNIQUE: Approximately 45 minutes after the intravenous administration of 10.7 mCi 18-Fluorine FDG, whole body PET images were acquired from base of skull to mid - thigh.\par \par COMPARISON: PT/CT WHOLE BODY 5/14/2020, with the following\par impression:\par Since: May 2015, Interval development of approximate 3.8 cm FDG avid cervical mass with Max SUV 21.3, consistent with patient's known cervical cancer.\par New FDG avid 8 mm left pelvic sidewall lymph node (axial 103, max SUV 9.8), suspicious for biologic tumor activity.\par Bilateral perihilar bronchial thickening with symmetric FDG activity (similar to earlier study).\par Focal FDG activity, proximal left lower lobe associated with approximate 8 mm branch point nodule (axial image 195, max SUV 4.2). Follow-up diagnostic CT scan 3 months time recommended\par \par Linear activity distal esophagus, most consistent with nonspecific esophagitis .\par CT protocol used for this PET/CT study is designed for attenuation correction and anatomic localization of PET findings.  This  CT is not designed to replace state-of-the-art diagnostic CT scans for specific imaging protocols of different body parts.\par \par FINDINGS:\par Head/Neck: No biologically active head/neck lesions.\par Normal uptake within the brain, pharyngeal lymphoid tissue, salivary glands and laryngeal musculature is noted.\par Thorax:   No suspicious hypermetabolic mediastinal, hilar, lung parenchymal lesions.\par Linear esophageal activity consistent with nonspecific esophagitis.\par Previous focal activity within the proximal left lower lobe is no longer identified.\par Minimal FDG activity within approximate 5 mm branching nodule left upper lobe new findings from May 14, 2020 (axial image #224).\par Previous mild bilateral hilar FDG activity associated with peribronchial thickening has diminished.\par Abdomen/Pelvis: Physiologic GI/  activity. Previous approximate 3.8 cm cervical mass is decreased in size (approximately 3.1 cm) and is no longer FDG avid (axial image 86).\par No FDG avid dwayne disease. Earlier FDG avid 8 mm left pelvic sidewall lymph node is no longer present.\par Musculoskeletal :  No suspicious hypermetabolic osseous lesions.\par \par Additional CT findings:\par Postcholecystectomy.\par Stable degenerative changes lower lumbar spine\par Near complete resolution of previously opacified right maxillary sinus.\par Decreased size of previous hiatal hernia.\par Upper lobe emphysematous changes with scarring.\par \par IMPRESSION:\par Since: May 14, 2020\par Previous approximate 3.8 cm cervical mass is significantly decreased in size (approximately 3.1 cm) and is no longer FDG avid (axial image 86).\par No FDG avid dwayne disease. Earlier FDG avid 8 mm left pelvic sidewall lymph node is no longer present.\par Minimal new FDG activity on attenuated corrected image (SUV 1.3) within approximate 5 mm branching nodule left upper lobe(axial image #224). Consider diagnostic CT scan of the thorax in 3 months time to assess stability.\par Resolution focal activity,  left lower lobe,  lung.\par \par KYLE ALVARADO MD; Attending Radiologist\par This document has been electronically signed. Dec  7 2020 10:54AM\par   [id number]\par \par  \par EXAM:  XR BONE DENSITY AXIAL\par PROCEDURE DATE:  08/24/2020\par =================================================================\par                        Bone Density Report\par =================================================================\par Age:           60\par Sex:           Female\par Ethnicity:     White-----------------------------------------------------------------\par \par Indication: osteopenia;\par Accession number: [id number]\par \par Bone Density:\par -----------------------------------------------------------------\par Region                   BMD    T-score  Z-score   Classification\par -----------------------------------------------------------------\par AP Spine (L1, L2, L3)    0.869   -1.4      0.2     Osteopenia\par Femoral Neck (Left)      0.755   -0.9      0.5     Normal\par Total Hip (Left)         0.828   -0.9      0.1     Normal\par -----------------------------------------------------------------\par \par World Health Organization criteria for BMD impression\par classify patients as:\par Normal (T-score at or above -1.0),\par Osteopenia (T-score between -1.0 and -2.5), or\par Osteoporosis (T-score at or below -2.5).\par \par \par Diagnosis: M89.9  F17.200  Z78.0  Z13.820\par EXAM:  US BREAST LIMITED LT\par EXAM:  MG MAMMO DIAG W DARREN BI#\par PROCEDURE DATE:  08/24/2020\par \par INTERPRETATION:  Clinical History / Reason for exam: Screening right breast. Follow-up left breast mass.\par \par Additional history: Right breast cancer status post lumpectomy at age 54.\par \par The patient reports her last clinical breast examination was performed about twelve months ago.\par \par Family history: There is no family history of breast cancer\par \par Comparisons: Mammograms dating back to 2016.\par \par Views obtained: bilateral full field digital 2D and digital tomosynthesis images.\par \par Computer-aided detection was utilized in the interpretation of this examination.\par \par Breast composition: There are scattered areas of fibroglandular density.\par \par Findings:\par \par Mammogram:\par \par No suspicious mass, microcalcifications or areas of architectural distortion is seen either breast. There is redemonstration of area of architectural distortion with evidence of fat necrosis at the lumpectomy site in the right breast.\par \par Ultrasound:\par \par Targeted unilateral left breast ultrasound was performed.\par \par The prior noted hypoechoic mass in the left axilla is not visualized on the current study which may be related to fluctuating cyst versus resolved sebaceous cyst.\par \par Impression: No mammographic or sonographic evidence of malignancy.\par \par Recommendation: Unless otherwise indicated by clinical findings, annual screening mammography recommended.\par \par BI-RADS Category 2: Benign\par \par The above findings and recommendations were discussed with the patient at the time of the examination.\par \par BONNIE POWER M.D., ATTENDING RADIOLOGIST\par This document has been electronically signed. Aug 24 2020 12:51PM\par \par \par \par \par \par

## 2021-06-28 DIAGNOSIS — C53.9 MALIGNANT NEOPLASM OF CERVIX UTERI, UNSPECIFIED: ICD-10-CM

## 2021-06-28 DIAGNOSIS — D05.11 INTRADUCTAL CARCINOMA IN SITU OF RIGHT BREAST: ICD-10-CM

## 2021-07-07 ENCOUNTER — APPOINTMENT (OUTPATIENT)
Dept: RADIATION ONCOLOGY | Facility: HOSPITAL | Age: 61
End: 2021-07-07
Payer: MEDICAID

## 2021-07-07 ENCOUNTER — APPOINTMENT (OUTPATIENT)
Dept: RADIATION ONCOLOGY | Facility: HOSPITAL | Age: 61
End: 2021-07-07

## 2021-07-07 ENCOUNTER — NON-APPOINTMENT (OUTPATIENT)
Age: 61
End: 2021-07-07

## 2021-07-07 VITALS
SYSTOLIC BLOOD PRESSURE: 106 MMHG | DIASTOLIC BLOOD PRESSURE: 69 MMHG | HEART RATE: 98 BPM | BODY MASS INDEX: 28.57 KG/M2 | OXYGEN SATURATION: 98 % | RESPIRATION RATE: 16 BRPM | WEIGHT: 177 LBS | TEMPERATURE: 96.9 F

## 2021-07-07 PROCEDURE — 99212 OFFICE O/P EST SF 10 MIN: CPT

## 2021-07-07 RX ORDER — ONDANSETRON 8 MG/1
8 TABLET ORAL 3 TIMES DAILY
Qty: 60 | Refills: 1 | Status: DISCONTINUED | COMMUNITY
Start: 2020-04-13 | End: 2021-07-07

## 2021-07-07 NOTE — PHYSICAL EXAM
[Normal] : oriented to person, place and time, the affect was normal, the mood was normal and not anxious [de-identified] : there are no groin nodes.  On pelvic exam, the vagina is patent.  There is no visible tumor in the vagina.  On bimanual the cervix is completely effaced.  The mucosa is smooth.  There are no suspicious findings.

## 2021-07-07 NOTE — DISEASE MANAGEMENT
[Clinical] : TNM Stage: c [IB] : IB [TTNM] : 1b [NTNM] : 0 [MTNM] : 0 [de-identified] : 9920cGy [de-identified] : 7770cGy [de-identified] : Cervix Hydroquinone Pregnancy And Lactation Text: This medication has not been assigned a Pregnancy Risk Category but animal studies failed to show danger with the topical medication. It is unknown if the medication is excreted in breast milk.

## 2021-07-09 NOTE — DISEASE MANAGEMENT
[Clinical] : TNM Stage: c [IB] : IB [TTNM] : 1b [NTNM] : 0 [MTNM] : 0 [de-identified] : 7210cGy [de-identified] : 7350cGy [de-identified] : Cervix

## 2021-07-09 NOTE — PHYSICAL EXAM
[Normal] : oriented to person, place and time, the affect was normal, the mood was normal and not anxious [de-identified] : Cannot identify the treated breast in the sitting position.  Supine the right bresat is soft, including at the lumpectomy site.  Left breast is negative.

## 2021-07-09 NOTE — HISTORY OF PRESENT ILLNESS
[FreeTextEntry1] : \par \par \par 10/15/2020: Pt returns for post treatment today. The pelvis was treated with a 3D/conformal plan as IMRT was denied.  After the first HDR she refused the remaining two and so a focused boost was deliver. She completed treatment on 9/10/2020. She followed up with Dr. Hall 10/13/2020. Started using dilator 3 weeks after completing RT, 2-3 times per week. Denies vaginal bleeding or discharge. Denies urinary issues. Has occasional diarrhea, taking Imodium as needed. Was instructed to follow up with Dr. Mitchell or one of his associates.

## 2021-07-21 ENCOUNTER — RX RENEWAL (OUTPATIENT)
Age: 61
End: 2021-07-21

## 2021-08-27 ENCOUNTER — APPOINTMENT (OUTPATIENT)
Dept: GYNECOLOGIC ONCOLOGY | Facility: CLINIC | Age: 61
End: 2021-08-27

## 2022-07-25 ENCOUNTER — APPOINTMENT (OUTPATIENT)
Dept: SURGERY | Facility: CLINIC | Age: 62
End: 2022-07-25

## 2022-10-18 ENCOUNTER — OUTPATIENT (OUTPATIENT)
Dept: OUTPATIENT SERVICES | Facility: HOSPITAL | Age: 62
LOS: 1 days | Discharge: HOME | End: 2022-10-18

## 2022-10-18 DIAGNOSIS — Z90.49 ACQUIRED ABSENCE OF OTHER SPECIFIED PARTS OF DIGESTIVE TRACT: Chronic | ICD-10-CM

## 2022-10-18 DIAGNOSIS — Z98.890 OTHER SPECIFIED POSTPROCEDURAL STATES: Chronic | ICD-10-CM

## 2022-10-18 DIAGNOSIS — Z12.31 ENCOUNTER FOR SCREENING MAMMOGRAM FOR MALIGNANT NEOPLASM OF BREAST: ICD-10-CM

## 2022-10-18 DIAGNOSIS — Z90.89 ACQUIRED ABSENCE OF OTHER ORGANS: Chronic | ICD-10-CM

## 2022-10-18 PROCEDURE — 77063 BREAST TOMOSYNTHESIS BI: CPT | Mod: 26

## 2022-10-18 PROCEDURE — 77067 SCR MAMMO BI INCL CAD: CPT | Mod: 26

## 2022-10-19 ENCOUNTER — OUTPATIENT (OUTPATIENT)
Dept: OUTPATIENT SERVICES | Facility: HOSPITAL | Age: 62
LOS: 1 days | Discharge: HOME | End: 2022-10-19

## 2022-10-19 ENCOUNTER — APPOINTMENT (OUTPATIENT)
Dept: RADIATION ONCOLOGY | Facility: HOSPITAL | Age: 62
End: 2022-10-19

## 2022-10-19 VITALS
DIASTOLIC BLOOD PRESSURE: 70 MMHG | TEMPERATURE: 98.1 F | BODY MASS INDEX: 28.25 KG/M2 | OXYGEN SATURATION: 93 % | RESPIRATION RATE: 16 BRPM | HEART RATE: 102 BPM | WEIGHT: 175 LBS | SYSTOLIC BLOOD PRESSURE: 126 MMHG

## 2022-10-19 DIAGNOSIS — Z98.890 OTHER SPECIFIED POSTPROCEDURAL STATES: Chronic | ICD-10-CM

## 2022-10-19 DIAGNOSIS — C53.0 MALIGNANT NEOPLASM OF ENDOCERVIX: ICD-10-CM

## 2022-10-19 DIAGNOSIS — Z90.49 ACQUIRED ABSENCE OF OTHER SPECIFIED PARTS OF DIGESTIVE TRACT: Chronic | ICD-10-CM

## 2022-10-19 DIAGNOSIS — Z90.89 ACQUIRED ABSENCE OF OTHER ORGANS: Chronic | ICD-10-CM

## 2022-10-19 DIAGNOSIS — C50.411 MALIGNANT NEOPLASM OF UPPER-OUTER QUADRANT OF RIGHT FEMALE BREAST: ICD-10-CM

## 2022-10-19 PROCEDURE — 99214 OFFICE O/P EST MOD 30 MIN: CPT

## 2022-10-19 RX ORDER — PEN NEEDLE, DIABETIC 32GX 5/32"
NEEDLE, DISPOSABLE MISCELLANEOUS
Qty: 1 | Refills: 0 | Status: ACTIVE | COMMUNITY
Start: 2022-04-30

## 2022-10-19 RX ORDER — FOLIC ACID 1 MG/1
1 TABLET ORAL
Qty: 30 | Refills: 0 | Status: ACTIVE | COMMUNITY
Start: 2022-10-11

## 2022-10-19 RX ORDER — PREDNISONE 10 MG/1
10 TABLET ORAL
Qty: 26 | Refills: 0 | Status: ACTIVE | COMMUNITY
Start: 2022-10-14

## 2022-10-19 RX ORDER — TRAMADOL HYDROCHLORIDE 50 MG/1
50 TABLET, COATED ORAL
Qty: 90 | Refills: 0 | Status: ACTIVE | COMMUNITY
Start: 2022-04-27

## 2022-10-24 NOTE — HISTORY OF PRESENT ILLNESS
[FreeTextEntry1] : 10/19/2022 Follow up: Patient returns for 7 year follow up s/p IORT of the right breast and 2 year follow up for pelvic (cervix) radiation. She denies breast pain or discomfort. She will follow up with Dr. Hall 10/31/2022. B/L mammogram done on 10/18/2022. She has not been following up with Gyn/Onc. She denies GI/ issues. She denies vaginal discharge or bleeding. \par \par 7/7/2021 Follow up  6 yrs s/p IORT to right breast.  .   No issues in the breast.  Last Mammo was 8/24/2020, makes note of "fat necrosis" at the lumpectomy site.   Patient is asymptomatic.  \par \par \par 7/7/2021 Follow up: She received IORT to right breast 2015 and completed RT to the pelvis/cervix 9/2020. Scheduled for next b/l mammogram 8/2021. Most recent Pap smear done by Dr. Mitchell 2/12/2021. Denies vaginal discharge or bleeding. Has occasional diarrhea, diet related. Taking Imodium as needed. Had bowel issues prior to chemo and RT. Followed up with Dr. Hall 6/22/2021.\par \par 1/28/2021:Sugey Austin returns for additional follow-up.  She denies vaginal bleeding, pelvic pain or swelling of the legs.  She had a PET scan in December which had no FDG avid sites.  \par \par 10/15/2020: Pt returns for post treatment today. The pelvis was treated with a 3D/conformal plan as IMRT was denied.  After the first HDR she refused the remaining two and so a focused boost was deliver. She completed treatment on 9/10/2020. She followed up with Dr. Hall 10/13/2020. Started using dilator 3 weeks after completing RT, 2-3 times per week. Denies vaginal bleeding or discharge. Denies urinary issues. Has occasional diarrhea, taking Imodium as needed. Was instructed to follow up with Dr. Mitchell or one of his associates.

## 2022-10-24 NOTE — PHYSICAL EXAM
[Sclera] : the sclera and conjunctiva were normal [] : no respiratory distress [Heart Rate And Rhythm] : heart rate and rhythm were normal [Symmetric] : breasts are symmetric [Abdomen Soft] : soft [Nondistended] : nondistended [Abdomen Tenderness] : non-tender [Normal External Genitalia] : normal external genitalia  [Normal Vagina] : normal vagina without lesions or masses [Erythematous] : no erythema [Discharge] : no discharge [No Bleeding] : there was no active vaginal bleeding [Normal] : oriented to person, place and time, the affect was normal, the mood was normal and not anxious [de-identified] : cervix is effaced

## 2022-10-31 ENCOUNTER — APPOINTMENT (OUTPATIENT)
Dept: HEMATOLOGY ONCOLOGY | Facility: CLINIC | Age: 62
End: 2022-10-31

## 2022-11-28 ENCOUNTER — OUTPATIENT (OUTPATIENT)
Dept: OUTPATIENT SERVICES | Facility: HOSPITAL | Age: 62
LOS: 1 days | End: 2022-11-28

## 2022-11-28 ENCOUNTER — APPOINTMENT (OUTPATIENT)
Dept: HEMATOLOGY ONCOLOGY | Facility: CLINIC | Age: 62
End: 2022-11-28

## 2022-11-28 VITALS
SYSTOLIC BLOOD PRESSURE: 123 MMHG | WEIGHT: 176 LBS | HEART RATE: 103 BPM | DIASTOLIC BLOOD PRESSURE: 84 MMHG | TEMPERATURE: 98.3 F | BODY MASS INDEX: 28.28 KG/M2 | HEIGHT: 66 IN

## 2022-11-28 DIAGNOSIS — Z98.890 OTHER SPECIFIED POSTPROCEDURAL STATES: Chronic | ICD-10-CM

## 2022-11-28 DIAGNOSIS — Z90.49 ACQUIRED ABSENCE OF OTHER SPECIFIED PARTS OF DIGESTIVE TRACT: Chronic | ICD-10-CM

## 2022-11-28 DIAGNOSIS — Z90.89 ACQUIRED ABSENCE OF OTHER ORGANS: Chronic | ICD-10-CM

## 2022-11-28 PROCEDURE — 99213 OFFICE O/P EST LOW 20 MIN: CPT

## 2022-11-28 NOTE — CONSULT LETTER
[Dear  ___] : Dear  [unfilled], [Consult Letter:] : I had the pleasure of evaluating your patient, [unfilled]. [Please see my note below.] : Please see my note below. [Consult Closing:] : Thank you very much for allowing me to participate in the care of this patient.  If you have any questions, please do not hesitate to contact me. [Sincerely,] : Sincerely, [FreeTextEntry3] : Palmer Hall MD\par Professor [employer]\par Viv/Di\par Attending Physician\par [facility]\par [phone]\par \par

## 2022-11-28 NOTE — RESULTS/DATA
[FreeTextEntry1] : MG MAMMO SCREEN W DARREN BI#\par \par PROCEDURE DATE: 10/18/2022\par \par \par \par INTERPRETATION: HISTORY:\par Bilateral MG MAMMO SCREEN W DARREN BI# was performed. Patient is 62 years old and is seen for screening. The patient has a history of right lumpectomy at age 55 - malignant. The patient has no family history of breast cancer.\par \par RISK ASSESSMENT:\par Brentoner-Katiezick Lifetime Risk: 3.9\par \par CLINICAL BREAST EXAM:\par The patient reports their last clinical breast exam was performed over one year ago.\par \par COMPARISON STUDIES:\par The present examination has been compared to prior imaging studies performed at [facility] on 12/13/2016, 06/30/2017, 07/10/2018, 06/18/2019 and 08/24/2020.\par \par MAMMOGRAM FINDINGS:\par Mammography was performed including the following views: bilateral craniocaudal with tomosynthesis, bilateral mediolateral oblique with tomosynthesis. The examination includes digital synthetic 2D and digital tomosynthesis 3D images. Additional imaging analysis was performed using CAD (computer-aided detection) software.\par \par There are scattered areas of fibroglandular density.\par \par There is an area of architectural distortion at the site of lumpectomy seen in the right breast.\par \par No suspicious mass, grouping of calcifications, or other abnormality is identified.\par \par IMPRESSION:\par There is no mammographic evidence of malignancy.\par \par RECOMMENDATION:\par Unless otherwise indicated by clinical findings, annual screening mammography recommended.\par \par ASSESSMENT:\par BI-RADS Category 2: Benign

## 2022-11-28 NOTE — ASSESSMENT
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, S/P chemo/RT on 9/2020\par \par PLAN:\par Previous notes reviewed and all relevant laboratory and radiology results discussed with  and communicated to the patient.\par \par #H/O  microinvasive hormone receptor positive breast cancer\par - Last mammogram in 10/22 was benign\par - She is due for mammogram on 10/23\par - Signs and symptoms of disease recurrence discussed with patient\par \par #Stage IIA1 poorly to moderately differentiated squamous cell carcinoma of cervix\par - Repeat PET scan (12/7/2020) showed favorable response although the cervical mass is not regressed significantly, FDG neg though. \par - Will get her lab results from her PCP\par - Follow up with gyn/onc\par \par RTC in  4 months\par \par

## 2022-11-28 NOTE — PHYSICAL EXAM
[Fully active, able to carry on all pre-disease performance without restriction] : Status 0 - Fully active, able to carry on all pre-disease performance without restriction [Normal] : affect appropriate [de-identified] : Right breast lumpectomy scar noted, bilateral axilla without adenopathy

## 2022-11-28 NOTE — HISTORY OF PRESENT ILLNESS
[Disease: _____________________] : Disease: [unfilled] [T: ___] : T[unfilled] [N: ___] : N[unfilled] [M: ___] : M[unfilled] [AJCC Stage: ____] : AJCC Stage: [unfilled] [de-identified] : Patient is a 60 year old year-old postmenopausal female transferring her care to me. \par \par She has history of ER and [place] positive 0.6 mm well, differentiated, microinvasive ductal carcinoma  ER/WV (+), HER2 (-). status post surgery currently on adjuvant Femara started in July 2015. \par She is s/p Right NLOC/SNB/RF Spect/XOFT 06/15/15 - 0/1 (-); DCIS intermed grade, no residual IDC; (-) margins\par Patient is taking calcium and vitamin D .\par She is refusing Pap smears.\par No FHx breast/ovarian cancer.\par  [de-identified] : IDC [de-identified] : ER and ID positive, and her-2 negative [de-identified] : < 1mm (microinvasive)\par UOQ [de-identified] : She has not been here since 11/18/16. However she states she has been compliant with Femara.\par She does not exercise.\par Her appetite is good.\par She denies any adverse events.\par \par 7/30/18:\par Doing well. Mammo in July 2018 was normal. DEXA in July 2018 showed osteopenia. \par C/o burning and pain while passing urine. Started few days ago, resolved by itself and recurred last night. Did not take any Abx for UTI. \par Compliant with Letrozole. Denies fever, nausea, vomiting, chest pain, SOB, abdominal pain, bowel problems.\par Colonoscopy done in 2017 and it was normal. \par \par 01/28/2019\par Patient is here for a follow up visit. \par C/O of weight gain and increased appetite. But otherwise tolerating letrozole well. \par Mammogram due in  july 2019. \par Denies fever, nausea, vomiting, chest pain, SOB, abdominal pain, bowel problems.\par Colonoscopy done in 2017 and it was normal. \par \par 7/8/19\par pt is here for follow up.\par Denies fever, nausea, vomiting, chest pain, SOB, abdominal pain, bowel problems.\par she had felt a lump under her left axilla, had mamamo/usg done neg as noted below.\par compliant with Femara.\par says she was diagnosed with iron deficiency last October??.\par states she was advised IV iron but has been taking po iron.\par Not taking Ca + D\par After extensive questioning pt finally admitted that she has been having vaginal spotting for several months.\par She has not seen a GYN in a long time\par \par 2/10/20\par  Patient here for follow up.  She denies any new complaints.  Patient denies any new palpable breast lumps or pain, denies skin changes, denies nipple discharge.  Patient denies cough, shortness of breath, denies fever, denies bone pain.\par compliant with letrozole.\par Still has vaginal spotting off and on.\par Did not go for USG of pelvis or GYN eval as advised previously.\par Pt states that this is not her first priority.\par Continues to smoke.\par No abdominal pain\par \par 4/13/2020: The patient is here for follow up . She remains on letrozole since July 2015. She denies fever, chills, no weight loss, no chest pain or shortness of breath . She is actively smoking. 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 She underwent conization with endometrial biopsy and vaginal exam on 3/2/2020 . 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 The patient is still reporting vaginal spotting intermittently\par \par \par 5/7/20\par Pt is here for follow up.\par She has completed MRI pelvis and was seen in Rad/Onc.\par No new symptoms\par \par 06/02/2020\par JUAN ANTONIO OSHEA a 59 year F is here today for follow up of Breast Ca and cervical cancer.\par Has been complaint with femara since 9/2015 vitamin and calcium.  Pt does not want to continue Femara while on Chemo and RT. \par Patient denies any new palpable breast lumps or pain, denies skin changes, denies nipple discharge. \par Denies vaginal bleeding, bloating, abdominal pain. Denies dysuria, fever, chills. \par She started RT today and will start weekly cisplatin today.  \par She had PET on 5/14: Focal FDG activity, proximal left lower lobe associated with approximate 8 mm branch point nodule (axial image 195, max SUV 4.2). Follow-up diagnostic CT scan 3 months time recommended. New FDG avid 8 mm left pelvic sidewall lymph node (axial 103, max SUV 9.8), suspicious for biologic tumor activity. \par CBC reviewed. \par \par 6/8/20\par Pt is here for follow up.\par She is s/p 1 weekly dose of Cisplatin which she tolerated well.\par Her only complaint is of heart burn since last week.\par In addition she has been experiencing insomnia.\par No N, V, D.\par Had mild vaginal bleeding.\par No abd pain.\par \par 6/15/20\par Pt is here for follow up.\par Is s/p 2 weekly treatments of cisplatin. Tolerating it well. No N or V.\par Had diarrhea last week which resolved spontaneously.\par No abd pain or vag bleeding.\par No fever.\par C/o insomnia, has been drinking a lot of caffeinated beverages\par \par 6/29/20\par pT is here for follow up.\par Tolerating chemo well.\par No reports of N, V. Has loose BMs occasionally well managed with Imodium.\par No vag bleeding\par \par 7/6/20\par Pt is here for follow up.\par No new complaints. Will be finishing RT tomorrow.\par No vag bleeding or pelvic pain.\par No fever, N, V, D\par compliant with femara\par \par 7/13/20- Juan Antonio is here for for follow up. She remains on femara, Ca and Vitamin D,. she still has not got the US breast yet. She continues to smoke. She completed radiation EBRT and is planned for brachytherapy. Her appetite is good. With the last treatment she started having diarrhea, nausea which is better when she takes prn meds. Pepcid helping with reflux\par No vaginal bleeding. \par \par 9/1/2020\par Patient is here for a follow-up visit for Breast and cervical cancer.  \par Patient denies fever, chills, nausea, vomiting, new pain or bleeding.\par She continues to take Femara, Calcium and Vitamin D.  She is approaching 5 years on Femara at the end of Sept 2020.\par She is anticipating brachytherapy with Dr. Delacruz.  \par DEXA showed osteopenia in AP spine, otherwise normal.  \par Patient reports she was unable to perform 2nd brachtherapy dose due to issues with IV access but is scheduled again for later this week.\par Reviewed most recent diagnostic mammo + sono from 08/24/2020 which was BI-RADS 2: Benign.\par \par 10/13/20\par Pt is here for follow up.\par Has completed RT and chemo for cervical cancer.\par No vaginal bleeding.\par No abd pain, N, V,. D\par No breast related complaints.\par \par 12/17/20\par Pt is here for follow up via teleheath.\par Has no new complaints.\par Denies vaginal bleeding, abdominal pain, N, V. D, urinary symptoms.\par Appetite is good.\par No bowel issues\par \par 2/23/2021\par Patient is here to follow up for breast cancer and cervical cancer\par She is feeling well today, denies any new breast lump or pain, skin changes or nipple discharge\par She denies abdominal pain, bloating, nausea, vomiting, vaginal bleeding or discharge\par She completed RT in September 2020\par She is updated with mammogram and bone density\par \par 6/22/021\par Pt is here to follow up for breast and cervical cancer\par She feels well today. appetite is good\par She denies any new breast lump/mass, skin changes or nipple discharge, no abdominal pain, bloating, vaginal discharge or bleeding\par She received 2 doses of COVID vaccine\par She is UTD with mammo and gyne\par \par 11/28/22\par Pt is here for a follow up after a long hiatus of over a year.\par denies vaginal bleeding, pelvic pain, breast masses.\par Has not seen GYN in over a year\par

## 2022-12-01 DIAGNOSIS — C50.912 MALIGNANT NEOPLASM OF UNSPECIFIED SITE OF LEFT FEMALE BREAST: ICD-10-CM

## 2022-12-01 DIAGNOSIS — C53.9 MALIGNANT NEOPLASM OF CERVIX UTERI, UNSPECIFIED: ICD-10-CM

## 2022-12-01 DIAGNOSIS — Z79.811 LONG TERM (CURRENT) USE OF AROMATASE INHIBITORS: ICD-10-CM

## 2022-12-01 DIAGNOSIS — C50.411 MALIGNANT NEOPLASM OF UPPER-OUTER QUADRANT OF RIGHT FEMALE BREAST: ICD-10-CM

## 2023-03-21 ENCOUNTER — APPOINTMENT (OUTPATIENT)
Dept: HEMATOLOGY ONCOLOGY | Facility: CLINIC | Age: 63
End: 2023-03-21

## 2023-05-16 ENCOUNTER — OUTPATIENT (OUTPATIENT)
Dept: OUTPATIENT SERVICES | Facility: HOSPITAL | Age: 63
LOS: 1 days | End: 2023-05-16
Payer: MEDICAID

## 2023-05-16 ENCOUNTER — APPOINTMENT (OUTPATIENT)
Dept: HEMATOLOGY ONCOLOGY | Facility: CLINIC | Age: 63
End: 2023-05-16
Payer: MEDICAID

## 2023-05-16 VITALS
RESPIRATION RATE: 18 BRPM | HEART RATE: 97 BPM | TEMPERATURE: 97.3 F | HEIGHT: 66 IN | BODY MASS INDEX: 26.36 KG/M2 | WEIGHT: 164 LBS | SYSTOLIC BLOOD PRESSURE: 122 MMHG | DIASTOLIC BLOOD PRESSURE: 81 MMHG

## 2023-05-16 DIAGNOSIS — C50.411 MALIGNANT NEOPLASM OF UPPER-OUTER QUADRANT OF RIGHT FEMALE BREAST: ICD-10-CM

## 2023-05-16 DIAGNOSIS — Z90.49 ACQUIRED ABSENCE OF OTHER SPECIFIED PARTS OF DIGESTIVE TRACT: Chronic | ICD-10-CM

## 2023-05-16 DIAGNOSIS — Z98.890 OTHER SPECIFIED POSTPROCEDURAL STATES: Chronic | ICD-10-CM

## 2023-05-16 DIAGNOSIS — Z90.89 ACQUIRED ABSENCE OF OTHER ORGANS: Chronic | ICD-10-CM

## 2023-05-16 PROCEDURE — 99213 OFFICE O/P EST LOW 20 MIN: CPT

## 2023-05-17 DIAGNOSIS — C50.411 MALIGNANT NEOPLASM OF UPPER-OUTER QUADRANT OF RIGHT FEMALE BREAST: ICD-10-CM

## 2023-05-18 NOTE — ASSESSMENT
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, S/P chemo/RT on 9/2020\par \par PLAN:\par Previous notes reviewed and all relevant laboratory and radiology results discussed with  and communicated to the patient.\par \par #H/O  microinvasive hormone receptor positive breast cancer\par - Last mammogram in 10/22 was benign.\par - She is due for mammogram on 10/23, script given.\par - Signs and symptoms of disease recurrence discussed with patient.\par - Continue to follow up with PCP and GI for health maintenance.\par \par #Stage IIA1 poorly to moderately differentiated squamous cell carcinoma of cervix\par - Repeat PET scan (12/7/2020) showed favorable response although the cervical mass is not regressed significantly, FDG neg though. \par - Emphasized to follow up with gyn/onc.\par - Healthy lifestyle with emphasis on smoking cessation advised.\par \par RTC in  6 months\par \par Case was seen and discussed with Dr. Hall who agreed with assessment and plan.\par

## 2023-05-18 NOTE — HISTORY OF PRESENT ILLNESS
[Disease: _____________________] : Disease: [unfilled] [T: ___] : T[unfilled] [N: ___] : N[unfilled] [M: ___] : M[unfilled] [AJCC Stage: ____] : AJCC Stage: [unfilled] [de-identified] : Patient is a 60 year old year-old postmenopausal female transferring her care to me. \par \par She has history of ER and [place] positive 0.6 mm well, differentiated, microinvasive ductal carcinoma  ER/[place] (+), HER2 (-). status post surgery currently on adjuvant Femara started in July 2015. \par She is s/p Right NLOC/SNB/RF Spect/XOFT 06/15/15 - 0/1 (-); DCIS intermed grade, no residual IDC; (-) margins\par Patient is taking calcium and vitamin D .\par She is refusing Pap smears.\par No FHx breast/ovarian cancer.\par  [de-identified] : IDC [de-identified] : ER and [place] positive, and her-2 negative [de-identified] : < 1mm (microinvasive)\par UOQ [de-identified] : She has not been here since 11/18/16. However she states she has been compliant with Femara.\par She does not exercise.\par Her appetite is good.\par She denies any adverse events.\par \par 7/30/18:\par Doing well. Mammo in July 2018 was normal. DEXA in July 2018 showed osteopenia. \par C/o burning and pain while passing urine. Started few days ago, resolved by itself and recurred last night. Did not take any Abx for UTI. \par Compliant with Letrozole. Denies fever, nausea, vomiting, chest pain, SOB, abdominal pain, bowel problems.\par Colonoscopy done in 2017 and it was normal. \par \par 01/28/2019\par Patient is here for a follow up visit. \par C/O of weight gain and increased appetite. But otherwise tolerating letrozole well. \par Mammogram due in  july 2019. \par Denies fever, nausea, vomiting, chest pain, SOB, abdominal pain, bowel problems.\par Colonoscopy done in 2017 and it was normal. \par \par 7/8/19\par pt is here for follow up.\par Denies fever, nausea, vomiting, chest pain, SOB, abdominal pain, bowel problems.\par she had felt a lump under her left axilla, had mamamo/usg done neg as noted below.\par compliant with Femara.\par says she was diagnosed with iron deficiency last October??.\par states she was advised IV iron but has been taking po iron.\par Not taking Ca + D\par After extensive questioning pt finally admitted that she has been having vaginal spotting for several months.\par She has not seen a GYN in a long time\par \par 2/10/20\par  Patient here for follow up.  She denies any new complaints.  Patient denies any new palpable breast lumps or pain, denies skin changes, denies nipple discharge.  Patient denies cough, shortness of breath, denies fever, denies bone pain.\par compliant with letrozole.\par Still has vaginal spotting off and on.\par Did not go for USG of pelvis or GYN eval as advised previously.\par Pt states that this is not her first priority.\par Continues to smoke.\par No abdominal pain\par \par 4/13/2020: The patient is here for follow up . She remains on letrozole since July 2015. She denies fever, chills, no weight loss, no chest pain or shortness of breath . She is actively smoking. 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 She underwent conization with endometrial biopsy and vaginal exam on 3/2/2020 . 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 The patient is still reporting vaginal spotting intermittently\par \par \par 5/7/20\par Pt is here for follow up.\par She has completed MRI pelvis and was seen in Rad/Onc.\par No new symptoms\par \par 06/02/2020\par JUAN ANTONIO OSHEA a 59 year F is here today for follow up of Breast Ca and cervical cancer.\par Has been complaint with femara since 9/2015 vitamin and calcium.  Pt does not want to continue Femara while on Chemo and RT. \par Patient denies any new palpable breast lumps or pain, denies skin changes, denies nipple discharge. \par Denies vaginal bleeding, bloating, abdominal pain. Denies dysuria, fever, chills. \par She started RT today and will start weekly cisplatin today.  \par She had PET on 5/14: Focal FDG activity, proximal left lower lobe associated with approximate 8 mm branch point nodule (axial image 195, max SUV 4.2). Follow-up diagnostic CT scan 3 months time recommended. New FDG avid 8 mm left pelvic sidewall lymph node (axial 103, max SUV 9.8), suspicious for biologic tumor activity. \par CBC reviewed. \par \par 6/8/20\par Pt is here for follow up.\par She is s/p 1 weekly dose of Cisplatin which she tolerated well.\par Her only complaint is of heart burn since last week.\par In addition she has been experiencing insomnia.\par No N, V, D.\par Had mild vaginal bleeding.\par No abd pain.\par \par 6/15/20\par Pt is here for follow up.\par Is s/p 2 weekly treatments of cisplatin. Tolerating it well. No N or V.\par Had diarrhea last week which resolved spontaneously.\par No abd pain or vag bleeding.\par No fever.\par C/o insomnia, has been drinking a lot of caffeinated beverages\par \par 6/29/20\par pT is here for follow up.\par Tolerating chemo well.\par No reports of N, V. Has loose BMs occasionally well managed with Imodium.\par No vag bleeding\par \par 7/6/20\par Pt is here for follow up.\par No new complaints. Will be finishing RT tomorrow.\par No vag bleeding or pelvic pain.\par No fever, N, V, D\par compliant with femara\par \par 7/13/20- Juan Antonio is here for for follow up. She remains on femara, Ca and Vitamin D,. she still has not got the US breast yet. She continues to smoke. She completed radiation EBRT and is planned for brachytherapy. Her appetite is good. With the last treatment she started having diarrhea, nausea which is better when she takes prn meds. Pepcid helping with reflux\par No vaginal bleeding. \par \par 9/1/2020\par Patient is here for a follow-up visit for Breast and cervical cancer.  \par Patient denies fever, chills, nausea, vomiting, new pain or bleeding.\par She continues to take Femara, Calcium and Vitamin D.  She is approaching 5 years on Femara at the end of Sept 2020.\par She is anticipating brachytherapy with Dr. Delacruz.  \par DEXA showed osteopenia in AP spine, otherwise normal.  \par Patient reports she was unable to perform 2nd brachtherapy dose due to issues with IV access but is scheduled again for later this week.\par Reviewed most recent diagnostic mammo + sono from 08/24/2020 which was BI-RADS 2: Benign.\par \par 10/13/20\par Pt is here for follow up.\par Has completed RT and chemo for cervical cancer.\par No vaginal bleeding.\par No abd pain, N, V,. D\par No breast related complaints.\par \par 12/17/20\par Pt is here for follow up via teleheath.\par Has no new complaints.\par Denies vaginal bleeding, abdominal pain, N, V. D, urinary symptoms.\par Appetite is good.\par No bowel issues\par \par 2/23/2021\par Patient is here to follow up for breast cancer and cervical cancer\par She is feeling well today, denies any new breast lump or pain, skin changes or nipple discharge\par She denies abdominal pain, bloating, nausea, vomiting, vaginal bleeding or discharge\par She completed RT in September 2020\par She is updated with mammogram and bone density\par \par 6/22/021\par Pt is here to follow up for breast and cervical cancer\par She feels well today. appetite is good\par She denies any new breast lump/mass, skin changes or nipple discharge, no abdominal pain, bloating, vaginal discharge or bleeding\par She received 2 doses of COVID vaccine\par She is UTD with mammo and gyne\par \par 11/28/22\par Pt is here for a follow up after a long hiatus of over a year.\par denies vaginal bleeding, pelvic pain, breast masses.\par Has not seen GYN in over a year\par \par 5/16/23\par Patient is here to follow up for breast and cervical cancer.\par She feels well, denies any breast related symptoms. She denies abdominal pain, nausea, vomiting or vaginal bleeding/abnormal discharge.\par She has not seen gyne since 2/2021.\par

## 2023-05-18 NOTE — PHYSICAL EXAM
[Fully active, able to carry on all pre-disease performance without restriction] : Status 0 - Fully active, able to carry on all pre-disease performance without restriction [Normal] : affect appropriate [de-identified] : Overweight [de-identified] : Right breast lumpectomy scar noted, bilateral breast exam today is unrevealing. [de-identified] : Abdominal scar noted

## 2023-05-18 NOTE — CONSULT LETTER
[Dear  ___] : Dear  [unfilled], [Consult Letter:] : I had the pleasure of evaluating your patient, [unfilled]. [Please see my note below.] : Please see my note below. [Consult Closing:] : Thank you very much for allowing me to participate in the care of this patient.  If you have any questions, please do not hesitate to contact me. [Sincerely,] : Sincerely, [FreeTextEntry3] : Palmer Hall MD\par Professor [employer]\par Viv[facility]\par Attending Physician\par [facility] [place]\par [phone]\par \par

## 2023-05-22 DIAGNOSIS — F17.200 NICOTINE DEPENDENCE, UNSPECIFIED, UNCOMPLICATED: ICD-10-CM

## 2023-05-22 DIAGNOSIS — C50.911 MALIGNANT NEOPLASM OF UNSPECIFIED SITE OF RIGHT FEMALE BREAST: ICD-10-CM

## 2023-10-01 PROBLEM — Z92.3 HISTORY OF RADIATION THERAPY: Status: RESOLVED | Noted: 2020-04-16 | Resolved: 2023-10-01

## 2023-10-18 ENCOUNTER — APPOINTMENT (OUTPATIENT)
Dept: RADIATION ONCOLOGY | Facility: HOSPITAL | Age: 63
End: 2023-10-18

## 2023-10-18 ENCOUNTER — APPOINTMENT (OUTPATIENT)
Dept: RADIATION ONCOLOGY | Facility: HOSPITAL | Age: 63
End: 2023-10-18
Payer: MEDICAID

## 2023-10-18 ENCOUNTER — OUTPATIENT (OUTPATIENT)
Dept: OUTPATIENT SERVICES | Facility: HOSPITAL | Age: 63
LOS: 1 days | End: 2023-10-18
Payer: MEDICAID

## 2023-10-18 VITALS
OXYGEN SATURATION: 96 % | RESPIRATION RATE: 16 BRPM | HEART RATE: 105 BPM | TEMPERATURE: 98.7 F | BODY MASS INDEX: 27.78 KG/M2 | SYSTOLIC BLOOD PRESSURE: 122 MMHG | DIASTOLIC BLOOD PRESSURE: 80 MMHG | WEIGHT: 172.13 LBS

## 2023-10-18 DIAGNOSIS — Z90.89 ACQUIRED ABSENCE OF OTHER ORGANS: Chronic | ICD-10-CM

## 2023-10-18 DIAGNOSIS — Z00.8 ENCOUNTER FOR OTHER GENERAL EXAMINATION: ICD-10-CM

## 2023-10-18 DIAGNOSIS — Z98.890 OTHER SPECIFIED POSTPROCEDURAL STATES: Chronic | ICD-10-CM

## 2023-10-18 DIAGNOSIS — Z90.49 ACQUIRED ABSENCE OF OTHER SPECIFIED PARTS OF DIGESTIVE TRACT: Chronic | ICD-10-CM

## 2023-10-18 PROCEDURE — 99213 OFFICE O/P EST LOW 20 MIN: CPT

## 2023-10-18 RX ORDER — CLINDAMYCIN HYDROCHLORIDE 300 MG/1
300 CAPSULE ORAL
Qty: 18 | Refills: 0 | Status: DISCONTINUED | COMMUNITY
Start: 2022-10-14 | End: 2023-10-18

## 2023-10-18 RX ORDER — UMECLIDINIUM BROMIDE AND VILANTEROL TRIFENATATE 62.5; 25 UG/1; UG/1
62.5-25 POWDER RESPIRATORY (INHALATION)
Qty: 60 | Refills: 0 | Status: DISCONTINUED | COMMUNITY
Start: 2020-01-04 | End: 2023-10-18

## 2023-10-18 RX ORDER — OMEPRAZOLE 40 MG/1
40 CAPSULE, DELAYED RELEASE ORAL
Qty: 30 | Refills: 2 | Status: DISCONTINUED | COMMUNITY
Start: 2020-06-08 | End: 2023-10-18

## 2023-10-23 DIAGNOSIS — C53.0 MALIGNANT NEOPLASM OF ENDOCERVIX: ICD-10-CM

## 2023-11-14 ENCOUNTER — OUTPATIENT (OUTPATIENT)
Dept: OUTPATIENT SERVICES | Facility: HOSPITAL | Age: 63
LOS: 1 days | End: 2023-11-14
Payer: MEDICARE

## 2023-11-14 ENCOUNTER — APPOINTMENT (OUTPATIENT)
Dept: HEMATOLOGY ONCOLOGY | Facility: CLINIC | Age: 63
End: 2023-11-14
Payer: MEDICARE

## 2023-11-14 VITALS
SYSTOLIC BLOOD PRESSURE: 167 MMHG | DIASTOLIC BLOOD PRESSURE: 74 MMHG | HEART RATE: 113 BPM | TEMPERATURE: 98.3 F | HEIGHT: 66 IN | WEIGHT: 172 LBS | BODY MASS INDEX: 27.64 KG/M2

## 2023-11-14 DIAGNOSIS — Z98.890 OTHER SPECIFIED POSTPROCEDURAL STATES: Chronic | ICD-10-CM

## 2023-11-14 DIAGNOSIS — Z90.49 ACQUIRED ABSENCE OF OTHER SPECIFIED PARTS OF DIGESTIVE TRACT: Chronic | ICD-10-CM

## 2023-11-14 DIAGNOSIS — C50.911 MALIGNANT NEOPLASM OF UNSPECIFIED SITE OF RIGHT FEMALE BREAST: ICD-10-CM

## 2023-11-14 DIAGNOSIS — C53.9 MALIGNANT NEOPLASM OF CERVIX UTERI, UNSPECIFIED: ICD-10-CM

## 2023-11-14 DIAGNOSIS — C50.411 MALIGNANT NEOPLASM OF UPPER-OUTER QUADRANT OF RIGHT FEMALE BREAST: ICD-10-CM

## 2023-11-14 DIAGNOSIS — F17.200 NICOTINE DEPENDENCE, UNSPECIFIED, UNCOMPLICATED: ICD-10-CM

## 2023-11-14 DIAGNOSIS — Z17.0 MALIGNANT NEOPLASM OF UPPER-OUTER QUADRANT OF RIGHT FEMALE BREAST: ICD-10-CM

## 2023-11-14 DIAGNOSIS — Z90.89 ACQUIRED ABSENCE OF OTHER ORGANS: Chronic | ICD-10-CM

## 2023-11-14 PROCEDURE — 99213 OFFICE O/P EST LOW 20 MIN: CPT

## 2024-03-20 NOTE — ASU PREOP CHECKLIST - WAS PATIENT ON BETA BLOCKER?
Note: Unable to reach patient for her 8:30 AM appointment.  Irregular menstrual bleeding was listed as the symptoms.  She saw Dr. Vogel on 2/27/2024 for what appears to be the same symptoms.  I left a message directing her back to her OB/GYN for reevaluation because of her symptoms today.  FERNIE Johnson MD   No

## 2024-05-28 ENCOUNTER — APPOINTMENT (OUTPATIENT)
Age: 64
End: 2024-05-28

## 2024-05-28 ENCOUNTER — APPOINTMENT (OUTPATIENT)
Age: 64
End: 2024-05-28
Payer: MEDICARE

## 2024-05-28 VITALS
HEART RATE: 116 BPM | TEMPERATURE: 97.7 F | DIASTOLIC BLOOD PRESSURE: 83 MMHG | SYSTOLIC BLOOD PRESSURE: 121 MMHG | WEIGHT: 170 LBS | OXYGEN SATURATION: 89 % | BODY MASS INDEX: 27.32 KG/M2 | RESPIRATION RATE: 17 BRPM | HEIGHT: 66 IN

## 2024-05-28 DIAGNOSIS — C53.9 MALIGNANT NEOPLASM OF CERVIX UTERI, UNSPECIFIED: ICD-10-CM

## 2024-05-28 DIAGNOSIS — D05.11 INTRADUCTAL CARCINOMA IN SITU OF RIGHT BREAST: ICD-10-CM

## 2024-05-28 PROCEDURE — 99214 OFFICE O/P EST MOD 30 MIN: CPT

## 2024-05-28 NOTE — REVIEW OF SYSTEMS
[Negative] : Allergic/Immunologic [FreeTextEntry6] : Smokes 5 cigs/day, She was started on Prednisone and antibiotics yesterday due to asthma flare.

## 2024-05-28 NOTE — ASSESSMENT
[FreeTextEntry1] : Patient is a 63 year old postmenopausal female with:  1) Microinvasive hormone receptor positive breast cancer, S/P adjuvant therapy with Femara x 5 years, 9/2015 - 9/2020, WANDA 2) Stage IIA1 Poorly to Moderately Differentiated Squamous Cell carcinoma of cervix, S/P chemo/RT on 9/2020  PLAN: Previous notes reviewed and all relevant laboratory and radiology results discussed with and communicated to the patient.  #H/O microinvasive hormone receptor positive breast cancer - Last mammogram in 10/22 was benign. - She is due for mammogram, script given. Pt states she went but had scheduling issues and mammogram was not done.  She requested assistance in getting appt.- MA to assist. - Signs and symptoms of disease recurrence discussed with patient and advised to call us if she has concerns. - Continue to follow up with PCP and GI for health maintenance.  #Stage IIA1 poorly to moderately differentiated squamous cell carcinoma of cervix - Repeat PET scan (12/7/2020) showed favorable response although the cervical mass is not regressed significantly, FDG neg though. - Emphasized to follow up with gyn/onc.  #Current smoker - Healthy lifestyle with emphasis on smoking cessation discussed. Pt agreed to seek referral [facility] Cessation at [facility]. [phone].  RTC in 6 months with Dr. Hall.

## 2024-05-28 NOTE — PHYSICAL EXAM
[Fully active, able to carry on all pre-disease performance without restriction] : Status 0 - Fully active, able to carry on all pre-disease performance without restriction [Normal] : clear to auscultation bilaterally, no dullness, no wheezing [de-identified] : Overweight [de-identified] : Right breast lumpectomy scar noted, bilateral breast exam today is unrevealing. [de-identified] : Abdominal scar noted

## 2024-05-28 NOTE — HISTORY OF PRESENT ILLNESS
[Disease: _____________________] : Disease: [unfilled] [T: ___] : T[unfilled] [N: ___] : N[unfilled] [M: ___] : M[unfilled] [AJCC Stage: ____] : AJCC Stage: [unfilled] [de-identified] : Patient is a 60 year old year-old postmenopausal female transferring her care to me.   She has history of ER and PA positive 0.6 mm well, differentiated, microinvasive ductal carcinoma  ER/PA (+), HER2 (-). status post surgery currently on adjuvant Femara started in July 2015.  She is s/p Right NLOC/SNB/RF Spect/XOFT 06/15/15 - 0/1 (-); DCIS intermed grade, no residual IDC; (-) margins Patient is taking calcium and vitamin D . She is refusing Pap smears. No FHx breast/ovarian cancer.  [de-identified] : IDC [de-identified] : ER and CA positive, and her-2 negative [de-identified] : < 1mm (microinvasive)\par  UOQ [de-identified] : She has not been here since 11/18/16. However she states she has been compliant with Femara. She does not exercise. Her appetite is good. She denies any adverse events.  7/30/18: Doing well. Mammo in July 2018 was normal. DEXA in July 2018 showed osteopenia.  C/o burning and pain while passing urine. Started few days ago, resolved by itself and recurred last night. Did not take any Abx for UTI.  Compliant with Letrozole. Denies fever, nausea, vomiting, chest pain, SOB, abdominal pain, bowel problems. Colonoscopy done in 2017 and it was normal.   01/28/2019 Patient is here for a follow up visit.  C/O of weight gain and increased appetite. But otherwise tolerating letrozole well.  Mammogram due in  july 2019.  Denies fever, nausea, vomiting, chest pain, SOB, abdominal pain, bowel problems. Colonoscopy done in 2017 and it was normal.   7/8/19 pt is here for follow up. Denies fever, nausea, vomiting, chest pain, SOB, abdominal pain, bowel problems. she had felt a lump under her left axilla, had mamamo/usg done neg as noted below. compliant with Femara. says she was diagnosed with iron deficiency last October??. states she was advised IV iron but has been taking po iron. Not taking Ca + D After extensive questioning pt finally admitted that she has been having vaginal spotting for several months. She has not seen a GYN in a long time  2/10/20  Patient here for follow up.  She denies any new complaints.  Patient denies any new palpable breast lumps or pain, denies skin changes, denies nipple discharge.  Patient denies cough, shortness of breath, denies fever, denies bone pain. compliant with letrozole. Still has vaginal spotting off and on. Did not go for USG of pelvis or GYN eval as advised previously. Pt states that this is not her first priority. Continues to smoke. No abdominal pain  4/13/2020: The patient is here for follow up . She remains on letrozole since July 2015. She denies fever, chills, no weight loss, no chest pain or shortness of breath . She is actively smoking. 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 She underwent conization with endometrial biopsy and vaginal exam on 3/2/2020 . 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 The patient is still reporting vaginal spotting intermittently  5/7/20 Pt is here for follow up. She has completed MRI pelvis and was seen in Rad/Onc. No new symptoms  06/02/2020 JUAN ANTONIO OSHEA a 59 year F is here today for follow up of Breast Ca and cervical cancer. Has been complaint with femara since 9/2015 vitamin and calcium.  Pt does not want to continue Femara while on Chemo and RT.  Patient denies any new palpable breast lumps or pain, denies skin changes, denies nipple discharge.  Denies vaginal bleeding, bloating, abdominal pain. Denies dysuria, fever, chills.  She started RT today and will start weekly cisplatin today.   She had PET on 5/14: Focal FDG activity, proximal left lower lobe associated with approximate 8 mm branch point nodule (axial image 195, max SUV 4.2). Follow-up diagnostic CT scan 3 months time recommended. New FDG avid 8 mm left pelvic sidewall lymph node (axial 103, max SUV 9.8), suspicious for biologic tumor activity.  CBC reviewed.   6/8/20 Pt is here for follow up. She is s/p 1 weekly dose of Cisplatin which she tolerated well. Her only complaint is of heart burn since last week. In addition she has been experiencing insomnia. No N, V, D. Had mild vaginal bleeding. No abd pain.  6/15/20 Pt is here for follow up. Is s/p 2 weekly treatments of cisplatin. Tolerating it well. No N or V. Had diarrhea last week which resolved spontaneously. No abd pain or vag bleeding. No fever. C/o insomnia, has been drinking a lot of caffeinated beverages  6/29/20 pT is here for follow up. Tolerating chemo well. No reports of N, V. Has loose BMs occasionally well managed with Imodium. No vag bleeding  7/6/20 Pt is here for follow up. No new complaints. Will be finishing RT tomorrow. No vag bleeding or pelvic pain. No fever, N, V, D compliant with femara  7/13/20- Juan Antonio is here for for follow up. She remains on femara, Ca and Vitamin D,. she still has not got the US breast yet. She continues to smoke. She completed radiation EBRT and is planned for brachytherapy. Her appetite is good. With the last treatment she started having diarrhea, nausea which is better when she takes prn meds. Pepcid helping with reflux No vaginal bleeding.   9/1/2020 Patient is here for a follow-up visit for Breast and cervical cancer.   Patient denies fever, chills, nausea, vomiting, new pain or bleeding. She continues to take Femara, Calcium and Vitamin D.  She is approaching 5 years on Femara at the end of Sept 2020. She is anticipating brachytherapy with Dr. Delacruz.   DEXA showed osteopenia in AP spine, otherwise normal.   Patient reports she was unable to perform 2nd brachtherapy dose due to issues with IV access but is scheduled again for later this week. Reviewed most recent diagnostic mammo + sono from 08/24/2020 which was BI-RADS 2: Benign.  10/13/20 Pt is here for follow up. Has completed RT and chemo for cervical cancer. No vaginal bleeding. No abd pain, N, V,. D No breast related complaints.  12/17/20 Pt is here for follow up via teleheath. Has no new complaints. Denies vaginal bleeding, abdominal pain, N, V. D, urinary symptoms. Appetite is good. No bowel issues  2/23/2021 Patient is here to follow up for breast cancer and cervical cancer She is feeling well today, denies any new breast lump or pain, skin changes or nipple discharge She denies abdominal pain, bloating, nausea, vomiting, vaginal bleeding or discharge She completed RT in September 2020 She is updated with mammogram and bone density  6/22/021 Pt is here to follow up for breast and cervical cancer She feels well today. appetite is good She denies any new breast lump/mass, skin changes or nipple discharge, no abdominal pain, bloating, vaginal discharge or bleeding She received 2 doses of COVID vaccine She is UTD with mammo and gyne  11/28/22 Pt is here for a follow up after a long hiatus of over a year. denies vaginal bleeding, pelvic pain, breast masses. Has not seen GYN in over a year  5/16/23 Patient is here to follow up for breast and cervical cancer. She feels well, denies any breast related symptoms. She denies abdominal pain, nausea, vomiting or vaginal bleeding/abnormal discharge. She has not seen gyne since 2/2021.  11/14/23 Patient is here to follow up for breast and cervical cancer. She feels well, denies any breast related symptoms. She denies abdominal pain, nausea, vomiting or vaginal bleeding/abnormal discharge. She has not seen gyne since 2/2021, last colonoscopy? She is UTD with PCP. She had just seen PCP yesterday due to asthma and was started on Prednisone and antibiotics. She is due for mammogram. She smokes 5 cigs/day, declined to be referred to Smoking Cessation .  5/28/24 Patient is here to follow up for breast and cervical cancer. She feels well, denies any breast related symptoms. She denies abdominal pain, nausea, vomiting or vaginal bleeding/abnormal discharge. She has not seen gyne since 2/2021, last colonoscopy? She is UTD with PCP.  She is due for mammogram. Pt still has to make appt. She smokes 5 cigs/day, agreed to seek referral [facility] C[facility] at [facility]. [phone]

## 2024-06-04 ENCOUNTER — RESULT REVIEW (OUTPATIENT)
Age: 64
End: 2024-06-04

## 2024-06-04 ENCOUNTER — OUTPATIENT (OUTPATIENT)
Dept: OUTPATIENT SERVICES | Facility: HOSPITAL | Age: 64
LOS: 1 days | End: 2024-06-04
Payer: MEDICARE

## 2024-06-04 DIAGNOSIS — Z00.00 ENCOUNTER FOR GENERAL ADULT MEDICAL EXAMINATION WITHOUT ABNORMAL FINDINGS: ICD-10-CM

## 2024-06-04 DIAGNOSIS — Z12.31 ENCOUNTER FOR SCREENING MAMMOGRAM FOR MALIGNANT NEOPLASM OF BREAST: ICD-10-CM

## 2024-06-04 DIAGNOSIS — Z98.890 OTHER SPECIFIED POSTPROCEDURAL STATES: Chronic | ICD-10-CM

## 2024-06-04 DIAGNOSIS — Z90.89 ACQUIRED ABSENCE OF OTHER ORGANS: Chronic | ICD-10-CM

## 2024-06-04 DIAGNOSIS — Z90.49 ACQUIRED ABSENCE OF OTHER SPECIFIED PARTS OF DIGESTIVE TRACT: Chronic | ICD-10-CM

## 2024-06-04 PROCEDURE — 77067 SCR MAMMO BI INCL CAD: CPT | Mod: 26

## 2024-06-04 PROCEDURE — 77063 BREAST TOMOSYNTHESIS BI: CPT

## 2024-06-04 PROCEDURE — 77063 BREAST TOMOSYNTHESIS BI: CPT | Mod: 26

## 2024-06-04 PROCEDURE — 77067 SCR MAMMO BI INCL CAD: CPT

## 2024-06-05 DIAGNOSIS — Z12.31 ENCOUNTER FOR SCREENING MAMMOGRAM FOR MALIGNANT NEOPLASM OF BREAST: ICD-10-CM

## 2024-06-12 ENCOUNTER — RESULT REVIEW (OUTPATIENT)
Age: 64
End: 2024-06-12

## 2024-06-12 ENCOUNTER — OUTPATIENT (OUTPATIENT)
Dept: OUTPATIENT SERVICES | Facility: HOSPITAL | Age: 64
LOS: 1 days | End: 2024-06-12
Payer: MEDICARE

## 2024-06-12 DIAGNOSIS — Z90.49 ACQUIRED ABSENCE OF OTHER SPECIFIED PARTS OF DIGESTIVE TRACT: Chronic | ICD-10-CM

## 2024-06-12 DIAGNOSIS — Z98.890 OTHER SPECIFIED POSTPROCEDURAL STATES: Chronic | ICD-10-CM

## 2024-06-12 DIAGNOSIS — R92.8 OTHER ABNORMAL AND INCONCLUSIVE FINDINGS ON DIAGNOSTIC IMAGING OF BREAST: ICD-10-CM

## 2024-06-12 PROCEDURE — 77065 DX MAMMO INCL CAD UNI: CPT | Mod: RT

## 2024-06-12 PROCEDURE — 76642 ULTRASOUND BREAST LIMITED: CPT | Mod: 26,RT

## 2024-06-12 PROCEDURE — 77061 BREAST TOMOSYNTHESIS UNI: CPT | Mod: 26

## 2024-06-12 PROCEDURE — 77065 DX MAMMO INCL CAD UNI: CPT | Mod: 26,RT

## 2024-06-12 PROCEDURE — G0279: CPT

## 2024-06-12 PROCEDURE — 76642 ULTRASOUND BREAST LIMITED: CPT | Mod: RT

## 2024-06-13 DIAGNOSIS — R92.8 OTHER ABNORMAL AND INCONCLUSIVE FINDINGS ON DIAGNOSTIC IMAGING OF BREAST: ICD-10-CM

## 2024-06-19 ENCOUNTER — RESULT REVIEW (OUTPATIENT)
Age: 64
End: 2024-06-19

## 2024-06-19 ENCOUNTER — OUTPATIENT (OUTPATIENT)
Dept: OUTPATIENT SERVICES | Facility: HOSPITAL | Age: 64
LOS: 1 days | End: 2024-06-19
Payer: MEDICARE

## 2024-06-19 ENCOUNTER — APPOINTMENT (OUTPATIENT)
Age: 64
End: 2024-06-19
Payer: MEDICARE

## 2024-06-19 DIAGNOSIS — Z90.49 ACQUIRED ABSENCE OF OTHER SPECIFIED PARTS OF DIGESTIVE TRACT: Chronic | ICD-10-CM

## 2024-06-19 DIAGNOSIS — Z98.890 OTHER SPECIFIED POSTPROCEDURAL STATES: Chronic | ICD-10-CM

## 2024-06-19 DIAGNOSIS — R92.8 OTHER ABNORMAL AND INCONCLUSIVE FINDINGS ON DIAGNOSTIC IMAGING OF BREAST: ICD-10-CM

## 2024-06-19 DIAGNOSIS — Z90.89 ACQUIRED ABSENCE OF OTHER ORGANS: Chronic | ICD-10-CM

## 2024-06-19 PROCEDURE — 88342 IMHCHEM/IMCYTCHM 1ST ANTB: CPT | Mod: 26

## 2024-06-19 PROCEDURE — 88342 IMHCHEM/IMCYTCHM 1ST ANTB: CPT

## 2024-06-19 PROCEDURE — 19083 BX BREAST 1ST LESION US IMAG: CPT | Mod: RT

## 2024-06-19 PROCEDURE — 77065 DX MAMMO INCL CAD UNI: CPT | Mod: RT

## 2024-06-19 PROCEDURE — 88305 TISSUE EXAM BY PATHOLOGIST: CPT | Mod: 26

## 2024-06-19 PROCEDURE — 88305 TISSUE EXAM BY PATHOLOGIST: CPT

## 2024-06-19 PROCEDURE — 88360 TUMOR IMMUNOHISTOCHEM/MANUAL: CPT

## 2024-06-19 PROCEDURE — A4648: CPT

## 2024-06-19 PROCEDURE — 77065 DX MAMMO INCL CAD UNI: CPT | Mod: 26,RT

## 2024-06-20 LAB — SURGICAL PATHOLOGY STUDY: SIGNIFICANT CHANGE UP

## 2024-06-21 ENCOUNTER — NON-APPOINTMENT (OUTPATIENT)
Age: 64
End: 2024-06-21

## 2024-06-24 ENCOUNTER — APPOINTMENT (OUTPATIENT)
Dept: BREAST CENTER | Facility: CLINIC | Age: 64
End: 2024-06-24
Payer: MEDICARE

## 2024-06-24 VITALS
SYSTOLIC BLOOD PRESSURE: 125 MMHG | WEIGHT: 175 LBS | DIASTOLIC BLOOD PRESSURE: 74 MMHG | BODY MASS INDEX: 29.16 KG/M2 | HEART RATE: 100 BPM | HEIGHT: 65 IN

## 2024-06-24 DIAGNOSIS — N63.10 UNSPECIFIED LUMP IN THE RIGHT BREAST, UNSPECIFIED QUADRANT: ICD-10-CM

## 2024-06-24 DIAGNOSIS — C50.911 MALIGNANT NEOPLASM OF UNSPECIFIED SITE OF RIGHT FEMALE BREAST: ICD-10-CM

## 2024-06-24 DIAGNOSIS — R92.0 MAMMOGRAPHIC MICROCALCIFICATION FOUND ON DIAGNOSTIC IMAGING OF BREAST: ICD-10-CM

## 2024-06-24 DIAGNOSIS — Z17.0 ESTROGEN RECEPTOR POSITIVE STATUS [ER+]: ICD-10-CM

## 2024-06-24 PROCEDURE — 99205 OFFICE O/P NEW HI 60 MIN: CPT

## 2024-06-24 NOTE — DATA REVIEWED
[FreeTextEntry1] : CC: [id number]     EXAM:  MG MAMMO DIAG RT   ORDERED BY: ILIANA NUNES  ACC: [id number]     EXAM:  US BX BRST 1ST RT SISC   ORDERED BY: ILIANA NUNES  PROCEDURE DATE:  06/19/2024    INTERPRETATION:  Clinical History / Reason for exam: Right breast mass.  PROCEDURES: right breast breast ultrasound guided spring loaded core biopsy and post clip placement two view right breast mammogram.  TECHNIQUE: Previous films and reports were reviewed. The procedure, its risks, benefits, and alternatives were explained to the patient, who expressed understanding and gave informed written and verbal consent. A time-out, which included the patient's full name, date of birth, description of the expected procedure and procedure site, was performed immediately before the procedure.  Using the usual sterile technique and ultrasound guidance, the previously described mass in the right breast 11:00 N4 was biopsied utilizing a 14-gauge automated Bard core biopsy device with a 13-gauge introducer sheath. 5 samples were collected.  A marking clip ([employer] top-hat) was deployed under ultrasound guidance. Hemostasis was obtained and a sterile dressing was applied.  A right mammogram was performed: VIEWS: CC and ML views of the right breast. BREAST COMPOSITION: There are scattered areas of fibroglandular density. FINDINGS: The biopsy clip placed during the ultrasound guided biopsy is in the anticipated location in the right breast. FINAL ASSESSMENT Category: Post Procedure Mammogram for Marker Placement  The patient tolerated the procedure well and left the department in good condition with written discharge instructions.   IMPRESSION:  Successful ultrasound guided core biopsy of the right breast.  Pathology: Pending, to be reported as an addendum.    6/19/2024 11:50 EDT  Surgical Pathology Report - Auth (Verified)  Specimen(s) Submitted Right breast mass Time obtained: 10:56 am Time in formalin: 10:58 am  Final Diagnosis Breast, right mass, ultrasound-guided needle core biopsies: - Invasive well-differentiated ductal carcinoma with focal lobular  features. - Ductal carcinoma in situ (DCIS), solid and cribriform types with microcalcifications, low nuclear grade. - Pending special studies for ER/NC proteins, proliferation index (Ki-67), and Her2/FABIOLA oncoprotein expression and/or gene amplification, with the results to follow and be reported as a separate addendum.  Comment: Clinical and radiographic correlation is recommended as this biopsy is taken from the upper outer quadrant which is where the patient's prior surgical procedure was performed in 2015 for carcinoma. Thus, the possibility of recurrence may be considered.  6/19/2024 11:50 EDT  Addendum Report - Auth (Verified)  Addendum Immunohistochemical studies were performed at [facility], [address] [place] [address], [facility] (see NOTE).  The results are as follows:  % POSITIVE  STAINING INTENSITY  ESTROGEN RECEPTOR                 100 STRONG (3+) PROGESTERONE RECEPTOR    35 MODERATE/STRONG (2+/3+) Ki-67   5  Comment: No current consensus exists as to the methodological evaluation, optimal cutoff and algorithm of clinical usage of the Ki-67 labeling proliferative index. Proposed cutoffs noted in the literature vary from:  Unfavorable    >10% or >14% or >20% or 30% ([place]). Intermediate    10-14%, 10-20%, 16-30% ([place]). Favorable     <10% or <15% ([place]). A positive test result is defined as positive nuclear staining in >1% of tumor cells.                              A negative test result is defined as nuclear staining in <1% of tumor cells.  Type of specimen fixation: 10% buffered formalin Marker           Result  Interpretation HER2                  0                                      Negative

## 2024-06-24 NOTE — ASSESSMENT
[FreeTextEntry1] :   On physical exam, there are no discrete masses in either breast or axilla.  There is no nipple discharge or inversion bilaterally.  There are no skin changes bilaterally.  We had a lengthy discussion regarding her diagnosis and treatment options.  Her pathology results were reviewed.  we will present her at tumor board and discuss the re lumpectomy. she is interested in breast conserving surgery The benefits and risks of the lumpectomy procedure were explained to the patient, including but not limited to bleeding, infection, seroma and/or hematoma formation, pain, numbness of skin, scarring, and possible re-operation if the surgical excision demonstrates positive margins.  Informed consent was obtained today.  She is aware that she will meet with the pre-surgical department here at the hospital for pre-surgery testing.  She is also aware that she will likely need to meet with her primary care physician, and/or other medical specialists to obtain clearance for surgery, and to contact them appropriately.     total time on encounter: 67 mins

## 2024-06-24 NOTE — PAST MEDICAL HISTORY
[Menarche Age ____] : age at menarche was [unfilled] [Total Preg ___] : G[unfilled] [Age At Live Birth ___] : Age at live birth: [unfilled] [FreeTextEntry7] : NONE

## 2024-06-24 NOTE — HISTORY OF PRESENT ILLNESS
[FreeTextEntry1] :  Pt is a 63 year old F who presents with new dx of right IDC ER/NV (+), HER2 (-) she denies any palpable masses or skin changes, She has history of ER and NV positive 0.6 mm well, differentiated, microinvasive ductal carcinoma ER/NV (+), HER2 (-). status post surgery, S/P adjuvant therapy with Femara x 5 years, 2015 - 2020  She is s/p Right NLOC/SNB/RF Spect/XOFT 06/15/15 - 0/1 (-); DCIS intermed grade, no residual IDC; (-) margins    PAST IMAGIN24:  B/L mammogram Diagnostic --> Bi-RADS 0 - scattered areas of fibroglandular density. - benign-type calcifications with associated benign architectural distortion corresponding to the site of surgery seen in the right breast. -an apparent mass seen in the upper outer quadrant of the right breast.  24: B/L mammogram Diagnostic --> Bi-RADS 4.  MAMMOGRAM: Indistinct mass in the upper outer quadrant of the right breast corresponds to the abnormality noted on the screening mammogram. Architectural distortion with benign type calcifications in the right breast is consistent with prior surgery. ULTRASOUND: Targeted right breast ultrasound was performed. Indistinct hypoechoic mass in the right breast, 11n 4  measures 0.5 x 0.4 x 0.3 cm. This corresponds to the mass seen on mammogram-->Ultrasound-guided biopsy  2024 Breast, right mass, ultrasound-guided needle core biopsies: (top-hat) - Invasive well-differentiated ductal carcinoma with focal lobular features. - Ductal carcinoma in situ (DCIS), solid and cribriform types with microcalcifications, low nuclear grade.  ESTROGEN RECEPTOR                 100 STRONG (3+) PROGESTERONE RECEPTOR    35 MODERATE/STRONG (2+/3+) Ki-67   5  HISTORICAL RISK FACTORS: -no family hx of breast or ovarian cancer -HRT + -, first born at age 17

## 2024-07-05 ENCOUNTER — APPOINTMENT (OUTPATIENT)
Dept: RADIATION ONCOLOGY | Facility: HOSPITAL | Age: 64
End: 2024-07-05

## 2024-07-10 ENCOUNTER — OUTPATIENT (OUTPATIENT)
Dept: OUTPATIENT SERVICES | Facility: HOSPITAL | Age: 64
LOS: 1 days | End: 2024-07-10

## 2024-07-10 DIAGNOSIS — Z00.8 ENCOUNTER FOR OTHER GENERAL EXAMINATION: ICD-10-CM

## 2024-07-10 DIAGNOSIS — Z90.49 ACQUIRED ABSENCE OF OTHER SPECIFIED PARTS OF DIGESTIVE TRACT: Chronic | ICD-10-CM

## 2024-07-10 DIAGNOSIS — Z98.890 OTHER SPECIFIED POSTPROCEDURAL STATES: Chronic | ICD-10-CM

## 2024-07-10 DIAGNOSIS — Z90.89 ACQUIRED ABSENCE OF OTHER ORGANS: Chronic | ICD-10-CM

## 2024-07-11 ENCOUNTER — APPOINTMENT (OUTPATIENT)
Dept: HEMATOLOGY ONCOLOGY | Facility: CLINIC | Age: 64
End: 2024-07-11

## 2024-07-11 DIAGNOSIS — Z00.8 ENCOUNTER FOR OTHER GENERAL EXAMINATION: ICD-10-CM

## 2024-07-16 ENCOUNTER — RESULT REVIEW (OUTPATIENT)
Age: 64
End: 2024-07-16

## 2024-07-16 ENCOUNTER — OUTPATIENT (OUTPATIENT)
Dept: OUTPATIENT SERVICES | Facility: HOSPITAL | Age: 64
LOS: 1 days | End: 2024-07-16
Payer: MEDICARE

## 2024-07-16 VITALS
DIASTOLIC BLOOD PRESSURE: 84 MMHG | RESPIRATION RATE: 22 BRPM | HEART RATE: 99 BPM | SYSTOLIC BLOOD PRESSURE: 152 MMHG | OXYGEN SATURATION: 96 % | HEIGHT: 66 IN | TEMPERATURE: 98 F | WEIGHT: 177.03 LBS

## 2024-07-16 DIAGNOSIS — Z90.89 ACQUIRED ABSENCE OF OTHER ORGANS: Chronic | ICD-10-CM

## 2024-07-16 DIAGNOSIS — Z98.890 OTHER SPECIFIED POSTPROCEDURAL STATES: Chronic | ICD-10-CM

## 2024-07-16 DIAGNOSIS — Z90.49 ACQUIRED ABSENCE OF OTHER SPECIFIED PARTS OF DIGESTIVE TRACT: Chronic | ICD-10-CM

## 2024-07-16 DIAGNOSIS — C50.911 MALIGNANT NEOPLASM OF UNSPECIFIED SITE OF RIGHT FEMALE BREAST: ICD-10-CM

## 2024-07-16 DIAGNOSIS — Z01.818 ENCOUNTER FOR OTHER PREPROCEDURAL EXAMINATION: ICD-10-CM

## 2024-07-16 LAB
ALBUMIN SERPL ELPH-MCNC: 3.9 G/DL — SIGNIFICANT CHANGE UP (ref 3.5–5.2)
ALP SERPL-CCNC: 107 U/L — SIGNIFICANT CHANGE UP (ref 30–115)
ALT FLD-CCNC: 17 U/L — SIGNIFICANT CHANGE UP (ref 0–41)
ANION GAP SERPL CALC-SCNC: 14 MMOL/L — SIGNIFICANT CHANGE UP (ref 7–14)
APTT BLD: 28.2 SEC — SIGNIFICANT CHANGE UP (ref 27–39.2)
AST SERPL-CCNC: 17 U/L — SIGNIFICANT CHANGE UP (ref 0–41)
BASOPHILS # BLD AUTO: 0.04 K/UL — SIGNIFICANT CHANGE UP (ref 0–0.2)
BASOPHILS NFR BLD AUTO: 0.4 % — SIGNIFICANT CHANGE UP (ref 0–1)
BILIRUB SERPL-MCNC: <0.2 MG/DL — SIGNIFICANT CHANGE UP (ref 0.2–1.2)
BUN SERPL-MCNC: 17 MG/DL — SIGNIFICANT CHANGE UP (ref 10–20)
CALCIUM SERPL-MCNC: 9.2 MG/DL — SIGNIFICANT CHANGE UP (ref 8.4–10.5)
CHLORIDE SERPL-SCNC: 104 MMOL/L — SIGNIFICANT CHANGE UP (ref 98–110)
CO2 SERPL-SCNC: 25 MMOL/L — SIGNIFICANT CHANGE UP (ref 17–32)
CREAT SERPL-MCNC: 0.5 MG/DL — LOW (ref 0.7–1.5)
EGFR: 104 ML/MIN/1.73M2 — SIGNIFICANT CHANGE UP
EOSINOPHIL # BLD AUTO: 0.68 K/UL — SIGNIFICANT CHANGE UP (ref 0–0.7)
EOSINOPHIL NFR BLD AUTO: 7.4 % — SIGNIFICANT CHANGE UP (ref 0–8)
GLUCOSE SERPL-MCNC: 85 MG/DL — SIGNIFICANT CHANGE UP (ref 70–99)
HCT VFR BLD CALC: 37.6 % — SIGNIFICANT CHANGE UP (ref 37–47)
HGB BLD-MCNC: 11.7 G/DL — LOW (ref 12–16)
IMM GRANULOCYTES NFR BLD AUTO: 0.4 % — HIGH (ref 0.1–0.3)
INR BLD: 0.9 RATIO — SIGNIFICANT CHANGE UP (ref 0.65–1.3)
LYMPHOCYTES # BLD AUTO: 2.07 K/UL — SIGNIFICANT CHANGE UP (ref 1.2–3.4)
LYMPHOCYTES # BLD AUTO: 22.4 % — SIGNIFICANT CHANGE UP (ref 20.5–51.1)
MCHC RBC-ENTMCNC: 25.5 PG — LOW (ref 27–31)
MCHC RBC-ENTMCNC: 31.1 G/DL — LOW (ref 32–37)
MCV RBC AUTO: 82.1 FL — SIGNIFICANT CHANGE UP (ref 81–99)
MONOCYTES # BLD AUTO: 0.57 K/UL — SIGNIFICANT CHANGE UP (ref 0.1–0.6)
MONOCYTES NFR BLD AUTO: 6.2 % — SIGNIFICANT CHANGE UP (ref 1.7–9.3)
NEUTROPHILS # BLD AUTO: 5.83 K/UL — SIGNIFICANT CHANGE UP (ref 1.4–6.5)
NEUTROPHILS NFR BLD AUTO: 63.2 % — SIGNIFICANT CHANGE UP (ref 42.2–75.2)
NRBC # BLD: 0 /100 WBCS — SIGNIFICANT CHANGE UP (ref 0–0)
PLATELET # BLD AUTO: 346 K/UL — SIGNIFICANT CHANGE UP (ref 130–400)
PMV BLD: 10.8 FL — HIGH (ref 7.4–10.4)
POTASSIUM SERPL-MCNC: 3.7 MMOL/L — SIGNIFICANT CHANGE UP (ref 3.5–5)
POTASSIUM SERPL-SCNC: 3.7 MMOL/L — SIGNIFICANT CHANGE UP (ref 3.5–5)
PROT SERPL-MCNC: 6.4 G/DL — SIGNIFICANT CHANGE UP (ref 6–8)
PROTHROM AB SERPL-ACNC: 10.2 SEC — SIGNIFICANT CHANGE UP (ref 9.95–12.87)
RBC # BLD: 4.58 M/UL — SIGNIFICANT CHANGE UP (ref 4.2–5.4)
RBC # FLD: 17.4 % — HIGH (ref 11.5–14.5)
SODIUM SERPL-SCNC: 143 MMOL/L — SIGNIFICANT CHANGE UP (ref 135–146)
WBC # BLD: 9.23 K/UL — SIGNIFICANT CHANGE UP (ref 4.8–10.8)
WBC # FLD AUTO: 9.23 K/UL — SIGNIFICANT CHANGE UP (ref 4.8–10.8)

## 2024-07-16 PROCEDURE — 85730 THROMBOPLASTIN TIME PARTIAL: CPT

## 2024-07-16 PROCEDURE — 85610 PROTHROMBIN TIME: CPT

## 2024-07-16 PROCEDURE — 71046 X-RAY EXAM CHEST 2 VIEWS: CPT | Mod: 26

## 2024-07-16 PROCEDURE — 99214 OFFICE O/P EST MOD 30 MIN: CPT | Mod: 25

## 2024-07-16 PROCEDURE — 80053 COMPREHEN METABOLIC PANEL: CPT

## 2024-07-16 PROCEDURE — 93005 ELECTROCARDIOGRAM TRACING: CPT

## 2024-07-16 PROCEDURE — 36415 COLL VENOUS BLD VENIPUNCTURE: CPT

## 2024-07-16 PROCEDURE — 85025 COMPLETE CBC W/AUTO DIFF WBC: CPT

## 2024-07-16 PROCEDURE — 71046 X-RAY EXAM CHEST 2 VIEWS: CPT

## 2024-07-16 PROCEDURE — 93010 ELECTROCARDIOGRAM REPORT: CPT

## 2024-07-16 NOTE — H&P PST ADULT - NSICDXPASTMEDICALHX_GEN_ALL_CORE_FT
PAST MEDICAL HISTORY:  Asthma     Breast cancer right- s/p radiation and lumpectomy    Cervical cancer chemo last 7/7/2020, RAD RX    COPD (chronic obstructive pulmonary disease) USES VENTOLIN DAILY    Current every day smoker     Eczema     H/O therapeutic radiation     History of blood transfusion     History of chemotherapy     OA (osteoarthritis)     Obesity     Seasonal allergies

## 2024-07-16 NOTE — H&P PST ADULT - REASON FOR ADMISSION
Case Type: OP Block TimeSuite: CASProceduralist: Mahin Pyle  Confirmed Surgery DateTime: 07- - 0:00PAST DateTime: 07- - 18:15Procedure: RIGHT BREAST LUMPECTOMY WITH PINTUITION LOCALIZATION, SENTINEL LYMPH NODE MAPPING AND BIOPSY, POSSBILE AXILLARY LYMPH NODE DISSECTION  ERP?: NoLaterality: N/ALength of Procedure: 90 Minutes  Anesthesia Type: General

## 2024-07-16 NOTE — H&P PST ADULT - NSICDXPASTSURGICALHX_GEN_ALL_CORE_FT
PAST SURGICAL HISTORY:  H/O breast biopsy     History of D&C cone biopsy- 3/2020    History of tonsillectomy     S/P cholecystectomy     S/P lumpectomy, right breast local rad rx

## 2024-07-16 NOTE — H&P PST ADULT - HISTORY OF PRESENT ILLNESS
63y Female presents today for presurgical testing for RIGHT BREAST LUMPECTOMY WITH PINTUITION LOCALIZATION, SENTINEL LYMPH NODE MAPPING AND BIOPSY, POSSBILE AXILLARY LYMPH NODE DISSECTION. Per SX note dated 6/24/24 "Pt is a 63 year old F who presents with new dx of right IDC ER/ME (+), HER2 (-)  she denies any palpable masses or skin changes,  She has history of ER and ME positive 0.6 mm well, differentiated, microinvasive ductal carcinoma ER/ME (+), HER2 (-). status post surgery, S/P adjuvant therapy with Femara x 5 years, 9/2015 - 9/2020  ?  She is s/p Right NLOC/SNB/RF Spect/XOFT 06/15/15 - 0/1 (-); DCIS intermed grade, no residual IDC; (-) margins....  On physical exam, there are no discrete masses in either breast or axilla. There is no nipple discharge or inversion bilaterally. There are no skin changes bilaterally. We had a lengthy discussion regarding her diagnosis and treatment options. Her pathology results were reviewed.  we will present her at tumor board and discuss the re lumpectomy. she is interested in breast conserving surgery  The benefits and risks of the lumpectomy procedure were explained to the patient, including but not limited to bleeding, infection, seroma and/or hematoma formation, pain, numbness of skin, scarring, and possible re-operation if the surgical excision demonstrates positive margins. Informed consent was obtained today. She is aware that she will meet with the pre-surgical department here at the hospital for pre-surgery testing. She is also aware that she will likely need to meet with her primary care physician, and/or other medical specialists to obtain clearance for surgery, and to contact them appropriately."  Patient states above is true and accurate. She denies pain, unintentional weight loss or other complaints. She is extremely agitated/abrasive during her visit and shouts "I don't want any anesthesia of any kind!" Patient was instructed to follow up with Sx office in regards to anesthesia, and was offered to reschedule her PAST visit is she wishes, but patient requests to complete visit. Now for scheduled procedure.   Patient/guardian denies any CP, palpitations, SOB, or dysuria. No recent URI or UTI. +chronic cough  Stated exercise tolerance is FOS 2  GALILEO screen reviewed    Patient/guardian denies any recent personal exposure to COVID19. Denies any sick contacts. Patient/guardian denies travel within the past 30 days. Patient was instructed to quarantine until after procedure.    Anesthesia Alert  NO--Difficult Airway - Class III  NO--History of neck surgery or radiation  NO--Limited ROM of neck  NO--History of Malignant hyperthermia  NO--Personal or family history of Pseudocholinesterase deficiency.  NO--Prior Anesthesia Complication  NO--Latex Allergy  NO--Loose teeth - full upper denture  NO--History of Rheumatoid Arthritis  NO--GALILEO   NO--Bleeding risk  NO--Other_____    [employer] Activity Status Index (DASI) from [employer]  on 7/16/2024  ** All calculations should be rechecked by clinician prior to use **    RESULT SUMMARY:  38.2 points  The higher the score (maximum 58.2), the higher the functional status.    7.44 METs        INPUTS:  Take care of self —> 2.75 = Yes  Walk indoors —> 1.75 = Yes  Walk 1&ndash;2 blocks on level ground —> 2.75 = Yes  Climb a flight of stairs or walk up a hill —> 5.5 = Yes  Run a short distance —> 8 = Yes  Do light work around the house —> 2.7 = Yes  Do moderate work around the house —> 3.5 = Yes  Do heavy work around the house —> 0 = No  Do yardwork —> 0 = No  Have sexual relations —> 5.25 = Yes  Participate in moderate recreational activities —> 6 = Yes  Participate in strenuous sports —> 0 = No    Revised Cardiac Risk Index for Pre-Operative Risk from [employer]  on 7/16/2024  ** All calculations should be rechecked by clinician prior to use **    RESULT SUMMARY:  0 points  Class I Risk    3.9 %  30-day risk of death, MI, or cardiac arrest    From [place] 2017. These numbers are higher than those from the original study (Juan Manuel 1999). See Evidence for details.      INPUTS:  Elevated-risk surgery —> 0 = No  History of ischemic heart disease —> 0 = No  History of congestive heart failure —> 0 = No  History of cerebrovascular disease —> 0 = No  Pre-operative treatment with insulin —> 0 = No  Pre-operative creatinine >2 mg/dL / 176.8 µmol/L —> 0 = No      Patient/guardian states that this is their complete medical history and list of medications.  Patient/guardian understands instructions given during this visit and was given the opportunity to ask questions and have them answered. They were instructed to follow up with their surgeon/surgeon's office with any questions regarding their procedure.   Vascular stents/Clips

## 2024-07-17 DIAGNOSIS — C50.911 MALIGNANT NEOPLASM OF UNSPECIFIED SITE OF RIGHT FEMALE BREAST: ICD-10-CM

## 2024-07-17 DIAGNOSIS — Z01.818 ENCOUNTER FOR OTHER PREPROCEDURAL EXAMINATION: ICD-10-CM

## 2024-07-25 ENCOUNTER — APPOINTMENT (OUTPATIENT)
Age: 64
End: 2024-07-25
Payer: MEDICARE

## 2024-07-25 ENCOUNTER — OUTPATIENT (OUTPATIENT)
Dept: OUTPATIENT SERVICES | Facility: HOSPITAL | Age: 64
LOS: 1 days | End: 2024-07-25
Payer: MEDICARE

## 2024-07-25 VITALS
WEIGHT: 179.56 LBS | BODY MASS INDEX: 28.86 KG/M2 | OXYGEN SATURATION: 96 % | TEMPERATURE: 98 F | HEART RATE: 101 BPM | SYSTOLIC BLOOD PRESSURE: 167 MMHG | DIASTOLIC BLOOD PRESSURE: 98 MMHG | HEIGHT: 66 IN

## 2024-07-25 DIAGNOSIS — Z90.89 ACQUIRED ABSENCE OF OTHER ORGANS: Chronic | ICD-10-CM

## 2024-07-25 DIAGNOSIS — Z98.890 OTHER SPECIFIED POSTPROCEDURAL STATES: Chronic | ICD-10-CM

## 2024-07-25 DIAGNOSIS — C53.9 MALIGNANT NEOPLASM OF CERVIX UTERI, UNSPECIFIED: ICD-10-CM

## 2024-07-25 DIAGNOSIS — Z90.49 ACQUIRED ABSENCE OF OTHER SPECIFIED PARTS OF DIGESTIVE TRACT: Chronic | ICD-10-CM

## 2024-07-25 PROCEDURE — G2211 COMPLEX E/M VISIT ADD ON: CPT

## 2024-07-25 PROCEDURE — 99215 OFFICE O/P EST HI 40 MIN: CPT

## 2024-07-26 DIAGNOSIS — C53.9 MALIGNANT NEOPLASM OF CERVIX UTERI, UNSPECIFIED: ICD-10-CM

## 2024-07-26 PROBLEM — Z92.3 PERSONAL HISTORY OF IRRADIATION: Chronic | Status: ACTIVE | Noted: 2024-07-16

## 2024-07-26 PROBLEM — E66.9 OBESITY, UNSPECIFIED: Chronic | Status: ACTIVE | Noted: 2024-07-16

## 2024-07-26 PROBLEM — Z92.89 PERSONAL HISTORY OF OTHER MEDICAL TREATMENT: Chronic | Status: ACTIVE | Noted: 2024-07-16

## 2024-07-26 PROBLEM — F17.200 NICOTINE DEPENDENCE, UNSPECIFIED, UNCOMPLICATED: Chronic | Status: ACTIVE | Noted: 2024-07-16

## 2024-07-26 PROBLEM — Z92.21 PERSONAL HISTORY OF ANTINEOPLASTIC CHEMOTHERAPY: Chronic | Status: ACTIVE | Noted: 2024-07-16

## 2024-07-29 ENCOUNTER — RESULT REVIEW (OUTPATIENT)
Age: 64
End: 2024-07-29

## 2024-07-29 ENCOUNTER — OUTPATIENT (OUTPATIENT)
Dept: OUTPATIENT SERVICES | Facility: HOSPITAL | Age: 64
LOS: 1 days | End: 2024-07-29
Payer: MEDICARE

## 2024-07-29 ENCOUNTER — APPOINTMENT (OUTPATIENT)
Age: 64
End: 2024-07-29
Payer: MEDICARE

## 2024-07-29 DIAGNOSIS — Z00.8 ENCOUNTER FOR OTHER GENERAL EXAMINATION: ICD-10-CM

## 2024-07-29 DIAGNOSIS — D05.11 INTRADUCTAL CARCINOMA IN SITU OF RIGHT BREAST: ICD-10-CM

## 2024-07-29 DIAGNOSIS — Z98.890 OTHER SPECIFIED POSTPROCEDURAL STATES: Chronic | ICD-10-CM

## 2024-07-29 DIAGNOSIS — Z90.49 ACQUIRED ABSENCE OF OTHER SPECIFIED PARTS OF DIGESTIVE TRACT: Chronic | ICD-10-CM

## 2024-07-29 DIAGNOSIS — Z90.89 ACQUIRED ABSENCE OF OTHER ORGANS: Chronic | ICD-10-CM

## 2024-07-29 DIAGNOSIS — C77.3 SECONDARY AND UNSPECIFIED MALIGNANT NEOPLASM OF AXILLA AND UPPER LIMB LYMPH NODES: ICD-10-CM

## 2024-07-29 PROCEDURE — 19281 PERQ DEVICE BREAST 1ST IMAG: CPT | Mod: RT

## 2024-07-29 PROCEDURE — A9541: CPT

## 2024-07-29 PROCEDURE — 78195 LYMPH SYSTEM IMAGING: CPT | Mod: 26

## 2024-07-29 PROCEDURE — A4648: CPT

## 2024-07-29 PROCEDURE — 78195 LYMPH SYSTEM IMAGING: CPT

## 2024-07-29 NOTE — HISTORY OF PRESENT ILLNESS
[Disease: _____________________] : Disease: [unfilled] [T: ___] : T[unfilled] [N: ___] : N[unfilled] [M: ___] : M[unfilled] [AJCC Stage: ____] : AJCC Stage: [unfilled] [de-identified] : Patient is a 60 year old year-old postmenopausal female transferring her care to me.   She has history of ER and CT positive 0.6 mm well, differentiated, microinvasive ductal carcinoma  ER/CT (+), HER2 (-). status post surgery currently on adjuvant Femara started in July 2015.  She is s/p Right NLOC/SNB/RF Spect/XOFT 06/15/15 - 0/1 (-); DCIS intermed grade, no residual IDC; (-) margins Patient is taking calcium and vitamin D . She is refusing Pap smears. No FHx breast/ovarian cancer.  [de-identified] : IDC [de-identified] : ER and DE positive, and her-2 negative [de-identified] : < 1mm (microinvasive)\par  UOQ [de-identified] : She has not been here since 11/18/16. However she states she has been compliant with Femara. She does not exercise. Her appetite is good. She denies any adverse events.  7/30/18: Doing well. Mammo in July 2018 was normal. DEXA in July 2018 showed osteopenia.  C/o burning and pain while passing urine. Started few days ago, resolved by itself and recurred last night. Did not take any Abx for UTI.  Compliant with Letrozole. Denies fever, nausea, vomiting, chest pain, SOB, abdominal pain, bowel problems. Colonoscopy done in 2017 and it was normal.   01/28/2019 Patient is here for a follow up visit.  C/O of weight gain and increased appetite. But otherwise tolerating letrozole well.  Mammogram due in  july 2019.  Denies fever, nausea, vomiting, chest pain, SOB, abdominal pain, bowel problems. Colonoscopy done in 2017 and it was normal.   7/8/19 pt is here for follow up. Denies fever, nausea, vomiting, chest pain, SOB, abdominal pain, bowel problems. she had felt a lump under her left axilla, had mamamo/usg done neg as noted below. compliant with Femara. says she was diagnosed with iron deficiency last October??. states she was advised IV iron but has been taking po iron. Not taking Ca + D After extensive questioning pt finally admitted that she has been having vaginal spotting for several months. She has not seen a GYN in a long time  2/10/20  Patient here for follow up.  She denies any new complaints.  Patient denies any new palpable breast lumps or pain, denies skin changes, denies nipple discharge.  Patient denies cough, shortness of breath, denies fever, denies bone pain. compliant with letrozole. Still has vaginal spotting off and on. Did not go for USG of pelvis or GYN eval as advised previously. Pt states that this is not her first priority. Continues to smoke. No abdominal pain  4/13/2020: The patient is here for follow up . She remains on letrozole since July 2015. She denies fever, chills, no weight loss, no chest pain or shortness of breath . She is actively smoking. 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 She underwent conization with endometrial biopsy and vaginal exam on 3/2/2020 . 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 The patient is still reporting vaginal spotting intermittently  5/7/20 Pt is here for follow up. She has completed MRI pelvis and was seen in Rad/Onc. No new symptoms  06/02/2020 JUAN ANTONIO OSHEA a 59 year F is here today for follow up of Breast Ca and cervical cancer. Has been complaint with femara since 9/2015 vitamin and calcium.  Pt does not want to continue Femara while on Chemo and RT.  Patient denies any new palpable breast lumps or pain, denies skin changes, denies nipple discharge.  Denies vaginal bleeding, bloating, abdominal pain. Denies dysuria, fever, chills.  She started RT today and will start weekly cisplatin today.   She had PET on 5/14: Focal FDG activity, proximal left lower lobe associated with approximate 8 mm branch point nodule (axial image 195, max SUV 4.2). Follow-up diagnostic CT scan 3 months time recommended. New FDG avid 8 mm left pelvic sidewall lymph node (axial 103, max SUV 9.8), suspicious for biologic tumor activity.  CBC reviewed.   6/8/20 Pt is here for follow up. She is s/p 1 weekly dose of Cisplatin which she tolerated well. Her only complaint is of heart burn since last week. In addition she has been experiencing insomnia. No N, V, D. Had mild vaginal bleeding. No abd pain.  6/15/20 Pt is here for follow up. Is s/p 2 weekly treatments of cisplatin. Tolerating it well. No N or V. Had diarrhea last week which resolved spontaneously. No abd pain or vag bleeding. No fever. C/o insomnia, has been drinking a lot of caffeinated beverages  6/29/20 pT is here for follow up. Tolerating chemo well. No reports of N, V. Has loose BMs occasionally well managed with Imodium. No vag bleeding  7/6/20 Pt is here for follow up. No new complaints. Will be finishing RT tomorrow. No vag bleeding or pelvic pain. No fever, N, V, D compliant with femara  7/13/20- Juan Antonio is here for for follow up. She remains on femara, Ca and Vitamin D,. she still has not got the US breast yet. She continues to smoke. She completed radiation EBRT and is planned for brachytherapy. Her appetite is good. With the last treatment she started having diarrhea, nausea which is better when she takes prn meds. Pepcid helping with reflux No vaginal bleeding.   9/1/2020 Patient is here for a follow-up visit for Breast and cervical cancer.   Patient denies fever, chills, nausea, vomiting, new pain or bleeding. She continues to take Femara, Calcium and Vitamin D.  She is approaching 5 years on Femara at the end of Sept 2020. She is anticipating brachytherapy with Dr. Delacruz.   DEXA showed osteopenia in AP spine, otherwise normal.   Patient reports she was unable to perform 2nd brachtherapy dose due to issues with IV access but is scheduled again for later this week. Reviewed most recent diagnostic mammo + sono from 08/24/2020 which was BI-RADS 2: Benign.  10/13/20 Pt is here for follow up. Has completed RT and chemo for cervical cancer. No vaginal bleeding. No abd pain, N, V,. D No breast related complaints.  12/17/20 Pt is here for follow up via teleheath. Has no new complaints. Denies vaginal bleeding, abdominal pain, N, V. D, urinary symptoms. Appetite is good. No bowel issues  2/23/2021 Patient is here to follow up for breast cancer and cervical cancer She is feeling well today, denies any new breast lump or pain, skin changes or nipple discharge She denies abdominal pain, bloating, nausea, vomiting, vaginal bleeding or discharge She completed RT in September 2020 She is updated with mammogram and bone density  6/22/021 Pt is here to follow up for breast and cervical cancer She feels well today. appetite is good She denies any new breast lump/mass, skin changes or nipple discharge, no abdominal pain, bloating, vaginal discharge or bleeding She received 2 doses of COVID vaccine She is UTD with mammo and gyne  11/28/22 Pt is here for a follow up after a long hiatus of over a year. denies vaginal bleeding, pelvic pain, breast masses. Has not seen GYN in over a year  5/16/23 Patient is here to follow up for breast and cervical cancer. She feels well, denies any breast related symptoms. She denies abdominal pain, nausea, vomiting or vaginal bleeding/abnormal discharge. She has not seen gyne since 2/2021.  11/14/23 Patient is here to follow up for breast and cervical cancer. She feels well, denies any breast related symptoms. She denies abdominal pain, nausea, vomiting or vaginal bleeding/abnormal discharge. She has not seen gyne since 2/2021, last colonoscopy? She is UTD with PCP. She had just seen PCP yesterday due to asthma and was started on Prednisone and antibiotics. She is due for mammogram. She smokes 5 cigs/day, declined to be referred to Smoking Cessation .  5/28/24 Patient is here to follow up for breast and cervical cancer. She feels well, denies any breast related symptoms. She denies abdominal pain, nausea, vomiting or vaginal bleeding/abnormal discharge. She has not seen gyne since 2/2021, last colonoscopy? She is UTD with PCP.  She is due for mammogram. Pt still has to make appt. She smokes 5 cigs/day, agreed to seek referral [facility] [phone]  7/25/24 Patient is here to follow up for breast cancer prior to her lumpectomy with Dr. Pyle. She feels well, denies any breast related symptoms. She denies abdominal pain, nausea, vomiting or vaginal bleeding/abnormal discharge. However, last mammo/breast US on 6/12/24 showed BIRAD4.   6/19/2024 Breast, right mass, ultrasound-guided needle core biopsies: (top-hat) - Invasive well-differentiated ductal carcinoma with focal lobular features. Ductal carcinoma in situ (DCIS), solid and cribriform types with microcalcifications low nuclear grade.

## 2024-07-29 NOTE — ASSESSMENT
[FreeTextEntry1] : Patient is a 63 year old postmenopausal female with:  1) Microinvasive hormone receptor positive breast cancer, S/P adjuvant therapy with Femara x 5 years, 9/2015 - 9/2020, WANDA 2) Stage IIA1 Poorly to Moderately Differentiated Squamous Cell carcinoma of cervix, S/P chemo/RT on 9/2020  PLAN: Previous notes reviewed and all relevant laboratory and radiology results discussed with and communicated to the patient.  #H/O microinvasive hormone receptor positive breast cancer   last mammo/breast US on 6/12/24 showed BIRAD4.   6/19/2024 Breast, right mass, ultrasound-guided needle core biopsies: (top-hat) - Invasive well-differentiated ductal carcinoma with focal lobular features. Ductal carcinoma in situ (DCIS), solid and cribriform types with microcalcifications low nuclear grade. Rt breast lumpectomy scheduled with Dr. Pyle, pending medical clearance.   - Continue to follow up with PCP and GI for health maintenance.  #Stage IIA1 poorly to moderately differentiated squamous cell carcinoma of cervix - Repeat PET scan (12/7/2020) showed favorable response although the cervical mass is not regressed significantly, FDG neg though. - Emphasized to follow up with gyn/onc.  #Current smoker - Healthy lifestyle with emphasis on smoking cessation discussed. Pt agreed to seek referral [facility] [phone] last visit but did not go.  RTC in 6 weeks with Dr. Hall.  Case was seen and discussed with Dr. Hall who agreed with the assessment and plan.

## 2024-07-29 NOTE — PHYSICAL EXAM
[Fully active, able to carry on all pre-disease performance without restriction] : Status 0 - Fully active, able to carry on all pre-disease performance without restriction [Normal] : affect appropriate [de-identified] : Overweight [de-identified] : Right breast lumpectomy scar noted, bilateral breast exam today is unrevealing. [de-identified] : Abdominal scar noted

## 2024-07-29 NOTE — ASSESSMENT
[FreeTextEntry1] : Patient is a 63 year old postmenopausal female with:  1) Microinvasive hormone receptor positive breast cancer, S/P adjuvant therapy with Femara x 5 years, 9/2015 - 9/2020, WANDA 2) Stage IIA1 Poorly to Moderately Differentiated Squamous Cell carcinoma of cervix, S/P chemo/RT on 9/2020  PLAN: Previous notes reviewed and all relevant laboratory and radiology results discussed with and communicated to the patient.  #H/O microinvasive hormone receptor positive breast cancer   last mammo/breast US on 6/12/24 showed BIRAD4.   6/19/2024 Breast, right mass, ultrasound-guided needle core biopsies: (top-hat) - Invasive well-differentiated ductal carcinoma with focal lobular features. Ductal carcinoma in situ (DCIS), solid and cribriform types with microcalcifications low nuclear grade. Rt breast lumpectomy scheduled with Dr. Pyle, pending medical clearance.   - Continue to follow up with PCP and GI for health maintenance.  #Stage IIA1 poorly to moderately differentiated squamous cell carcinoma of cervix - Repeat PET scan (12/7/2020) showed favorable response although the cervical mass is not regressed significantly, FDG neg though. - Emphasized to follow up with gyn/onc.  #Current smoker - Healthy lifestyle with emphasis on smoking cessation discussed. Pt agreed to seek referral [facility]. [phone] last visit but did not go.  RTC in 6 weeks with Dr. Hall.  Case was seen and discussed with Dr. Hall who agreed with the assessment and plan.

## 2024-07-29 NOTE — HISTORY OF PRESENT ILLNESS
[Disease: _____________________] : Disease: [unfilled] [T: ___] : T[unfilled] [N: ___] : N[unfilled] [M: ___] : M[unfilled] [AJCC Stage: ____] : AJCC Stage: [unfilled] [de-identified] : Patient is a 60 year old year-old postmenopausal female transferring her care to me.   She has history of ER and CT positive 0.6 mm well, differentiated, microinvasive ductal carcinoma  ER/CT (+), HER2 (-). status post surgery currently on adjuvant Femara started in July 2015.  She is s/p Right NLOC/SNB/RF Spect/XOFT 06/15/15 - 0/1 (-); DCIS intermed grade, no residual IDC; (-) margins Patient is taking calcium and vitamin D . She is refusing Pap smears. No FHx breast/ovarian cancer.  [de-identified] : IDC [de-identified] : ER and HI positive, and her-2 negative [de-identified] : < 1mm (microinvasive)\par  UOQ [de-identified] : She has not been here since 11/18/16. However she states she has been compliant with Femara. She does not exercise. Her appetite is good. She denies any adverse events.  7/30/18: Doing well. Mammo in July 2018 was normal. DEXA in July 2018 showed osteopenia.  C/o burning and pain while passing urine. Started few days ago, resolved by itself and recurred last night. Did not take any Abx for UTI.  Compliant with Letrozole. Denies fever, nausea, vomiting, chest pain, SOB, abdominal pain, bowel problems. Colonoscopy done in 2017 and it was normal.   01/28/2019 Patient is here for a follow up visit.  C/O of weight gain and increased appetite. But otherwise tolerating letrozole well.  Mammogram due in  july 2019.  Denies fever, nausea, vomiting, chest pain, SOB, abdominal pain, bowel problems. Colonoscopy done in 2017 and it was normal.   7/8/19 pt is here for follow up. Denies fever, nausea, vomiting, chest pain, SOB, abdominal pain, bowel problems. she had felt a lump under her left axilla, had mamamo/usg done neg as noted below. compliant with Femara. says she was diagnosed with iron deficiency last October??. states she was advised IV iron but has been taking po iron. Not taking Ca + D After extensive questioning pt finally admitted that she has been having vaginal spotting for several months. She has not seen a GYN in a long time  2/10/20  Patient here for follow up.  She denies any new complaints.  Patient denies any new palpable breast lumps or pain, denies skin changes, denies nipple discharge.  Patient denies cough, shortness of breath, denies fever, denies bone pain. compliant with letrozole. Still has vaginal spotting off and on. Did not go for USG of pelvis or GYN eval as advised previously. Pt states that this is not her first priority. Continues to smoke. No abdominal pain  4/13/2020: The patient is here for follow up . She remains on letrozole since July 2015. She denies fever, chills, no weight loss, no chest pain or shortness of breath . She is actively smoking. 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 She underwent conization with endometrial biopsy and vaginal exam on 3/2/2020 . 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 The patient is still reporting vaginal spotting intermittently  5/7/20 Pt is here for follow up. She has completed MRI pelvis and was seen in Rad/Onc. No new symptoms  06/02/2020 JUAN ANTONIO OSHEA a 59 year F is here today for follow up of Breast Ca and cervical cancer. Has been complaint with femara since 9/2015 vitamin and calcium.  Pt does not want to continue Femara while on Chemo and RT.  Patient denies any new palpable breast lumps or pain, denies skin changes, denies nipple discharge.  Denies vaginal bleeding, bloating, abdominal pain. Denies dysuria, fever, chills.  She started RT today and will start weekly cisplatin today.   She had PET on 5/14: Focal FDG activity, proximal left lower lobe associated with approximate 8 mm branch point nodule (axial image 195, max SUV 4.2). Follow-up diagnostic CT scan 3 months time recommended. New FDG avid 8 mm left pelvic sidewall lymph node (axial 103, max SUV 9.8), suspicious for biologic tumor activity.  CBC reviewed.   6/8/20 Pt is here for follow up. She is s/p 1 weekly dose of Cisplatin which she tolerated well. Her only complaint is of heart burn since last week. In addition she has been experiencing insomnia. No N, V, D. Had mild vaginal bleeding. No abd pain.  6/15/20 Pt is here for follow up. Is s/p 2 weekly treatments of cisplatin. Tolerating it well. No N or V. Had diarrhea last week which resolved spontaneously. No abd pain or vag bleeding. No fever. C/o insomnia, has been drinking a lot of caffeinated beverages  6/29/20 pT is here for follow up. Tolerating chemo well. No reports of N, V. Has loose BMs occasionally well managed with Imodium. No vag bleeding  7/6/20 Pt is here for follow up. No new complaints. Will be finishing RT tomorrow. No vag bleeding or pelvic pain. No fever, N, V, D compliant with femara  7/13/20- Juan Antonio is here for for follow up. She remains on femara, Ca and Vitamin D,. she still has not got the US breast yet. She continues to smoke. She completed radiation EBRT and is planned for brachytherapy. Her appetite is good. With the last treatment she started having diarrhea, nausea which is better when she takes prn meds. Pepcid helping with reflux No vaginal bleeding.   9/1/2020 Patient is here for a follow-up visit for Breast and cervical cancer.   Patient denies fever, chills, nausea, vomiting, new pain or bleeding. She continues to take Femara, Calcium and Vitamin D.  She is approaching 5 years on Femara at the end of Sept 2020. She is anticipating brachytherapy with Dr. Delacruz.   DEXA showed osteopenia in AP spine, otherwise normal.   Patient reports she was unable to perform 2nd brachtherapy dose due to issues with IV access but is scheduled again for later this week. Reviewed most recent diagnostic mammo + sono from 08/24/2020 which was BI-RADS 2: Benign.  10/13/20 Pt is here for follow up. Has completed RT and chemo for cervical cancer. No vaginal bleeding. No abd pain, N, V,. D No breast related complaints.  12/17/20 Pt is here for follow up via teleheath. Has no new complaints. Denies vaginal bleeding, abdominal pain, N, V. D, urinary symptoms. Appetite is good. No bowel issues  2/23/2021 Patient is here to follow up for breast cancer and cervical cancer She is feeling well today, denies any new breast lump or pain, skin changes or nipple discharge She denies abdominal pain, bloating, nausea, vomiting, vaginal bleeding or discharge She completed RT in September 2020 She is updated with mammogram and bone density  6/22/021 Pt is here to follow up for breast and cervical cancer She feels well today. appetite is good She denies any new breast lump/mass, skin changes or nipple discharge, no abdominal pain, bloating, vaginal discharge or bleeding She received 2 doses of COVID vaccine She is UTD with mammo and gyne  11/28/22 Pt is here for a follow up after a long hiatus of over a year. denies vaginal bleeding, pelvic pain, breast masses. Has not seen GYN in over a year  5/16/23 Patient is here to follow up for breast and cervical cancer. She feels well, denies any breast related symptoms. She denies abdominal pain, nausea, vomiting or vaginal bleeding/abnormal discharge. She has not seen gyne since 2/2021.  11/14/23 Patient is here to follow up for breast and cervical cancer. She feels well, denies any breast related symptoms. She denies abdominal pain, nausea, vomiting or vaginal bleeding/abnormal discharge. She has not seen gyne since 2/2021, last colonoscopy? She is UTD with PCP. She had just seen PCP yesterday due to asthma and was started on Prednisone and antibiotics. She is due for mammogram. She smokes 5 cigs/day, declined to be referred to Smoking Cessation .  5/28/24 Patient is here to follow up for breast and cervical cancer. She feels well, denies any breast related symptoms. She denies abdominal pain, nausea, vomiting or vaginal bleeding/abnormal discharge. She has not seen gyne since 2/2021, last colonoscopy? She is UTD with PCP.  She is due for mammogram. Pt still has to make appt. She smokes 5 cigs/day, agreed to seek referral [facility] [phone]  7/25/24 Patient is here to follow up for breast cancer prior to her lumpectomy with Dr. Pyle. She feels well, denies any breast related symptoms. She denies abdominal pain, nausea, vomiting or vaginal bleeding/abnormal discharge. However, last mammo/breast US on 6/12/24 showed BIRAD4.   6/19/2024 Breast, right mass, ultrasound-guided needle core biopsies: (top-hat) - Invasive well-differentiated ductal carcinoma with focal lobular features. Ductal carcinoma in situ (DCIS), solid and cribriform types with microcalcifications low nuclear grade.

## 2024-07-29 NOTE — PHYSICAL EXAM
[Fully active, able to carry on all pre-disease performance without restriction] : Status 0 - Fully active, able to carry on all pre-disease performance without restriction [Normal] : affect appropriate [de-identified] : Overweight [de-identified] : Right breast lumpectomy scar noted, bilateral breast exam today is unrevealing. [de-identified] : Abdominal scar noted

## 2024-07-29 NOTE — END OF VISIT
[FreeTextEntry3] : I was physically present for the key portions of the evaluation and management service provided.  I agree with the history and physical, and plan which I have reviewed and edited where appropriate.

## 2024-07-30 ENCOUNTER — APPOINTMENT (OUTPATIENT)
Dept: BREAST CENTER | Facility: AMBULATORY SURGERY CENTER | Age: 64
End: 2024-07-30

## 2024-07-30 ENCOUNTER — RESULT REVIEW (OUTPATIENT)
Age: 64
End: 2024-07-30

## 2024-07-30 DIAGNOSIS — C77.3 SECONDARY AND UNSPECIFIED MALIGNANT NEOPLASM OF AXILLA AND UPPER LIMB LYMPH NODES: ICD-10-CM

## 2024-07-30 RX ORDER — FLUTICASONE FUROATE, UMECLIDINIUM BROMIDE AND VILANTEROL TRIFENATATE 200; 62.5; 25 UG/1; UG/1; UG/1
0 POWDER RESPIRATORY (INHALATION)
Qty: 0 | Refills: 0 | DISCHARGE

## 2024-07-30 RX ORDER — ALBUTEROL 90 MCG
0 AEROSOL REFILL (GRAM) INHALATION
Qty: 0 | Refills: 0 | DISCHARGE

## 2024-07-30 RX ORDER — TRAMADOL HYDROCHLORIDE 50 MG/1
0 TABLET, FILM COATED ORAL
Qty: 0 | Refills: 0 | DISCHARGE

## 2024-08-01 ENCOUNTER — NON-APPOINTMENT (OUTPATIENT)
Age: 64
End: 2024-08-01

## 2024-08-01 NOTE — DISCUSSION/SUMMARY
[FreeTextEntry1] : REASON FOR VISIT: Ms. Sugey Austin is a 63-year-old female who was called on 2024 for a discussion regarding her negative genetic test results related to hereditary cancer predisposition.    RELEVANT MEDICAL AND SURGICAL HISTORY: Ms. Austin has a history of microinvasive hormone receptor positive right breast cancer diagnosed 2015. She is s/p adjuvant therapy with Femara for 5 years.  She was also diagnosed with stage 1B poorly differentiated squamous cell carcinoma of cervix in . She is s/p chemo/RT on 2020.  She now presents with a second new diagnosis of right invasive ductal carcinoma, ER+/AR+/HER2-.  OTHER MEDICAL AND SURGICAL HISTORY: - Arthritis - Asthma  PAST OB/GYN HISTORY: Obstetrical History:  Post-Menopausal Age at First Live Birth: 17  CANCER SCREENING HISTORY: Breast: See comments above. GYN: Hx of cervical cancer. Last gyn-onc appointment was 2021. Colon: Last colonoscopy over 5 years ago, the patient reported no polyps. Frequency: 10 years. Skin: Denied  SOCIAL HISTORY:  Tobacco-product use: Current smoker  FAMILY HISTORY: Paternal ancestry was reported as [place] and maternal ancestry was reported as [place]. A detailed family history of cancer was ascertained, see below for pedigree. Of note: - Brother with prostate cancer in his 60s - Father with lung cancer or mesothelioma - Paternal uncle with unknown cancer  The remaining family history is unremarkable. According to the patient there are no other known cases of significant cancers in the family. To her knowledge no one else in the family has had germline testing for cancer susceptibility.   TEST RESULTS: NEGATIVE   NO pathogenic (disease-causing) variants or variants of uncertain significance were detected in the following genes: RENETTA, BARD1, BRCA1, BRCA2, BRIP1, CDH1, CHEK2, DICER1, MLH1, MSH2, MSH6, NBN, NF1, PALB2, PMS2, PTEN, RAD51C, RAD51D, SMARCA4, STK11 and TP53 (sequencing and deletion/duplication); HOXB13 (sequencing only); EPCAM (deletion/duplication only).    RESULTS INTERPRETATION AND ASSESSMENT: Given Ms. Austin's current reported personal and family history of cancer, and her negative genetic test results, the following screening guidelines and risk-reducing recommendations were discussed: Breast: Long-term management and surveillance should be based on the patients on- or post-treatment protocol as recommended by her surgeons and/or oncologist. Other: In the absence of other indications, the patient should practice age-appropriate cancer screening for all other organ systems as recommended for the general population.   We also discussed that, while no clear cause of the patient's personal and family history of cancer was identified, this result, while reassuring, does not entirely rule out a hereditary cancer risk in the patient. It is possible the patient has a mutation in one of the genes tested that is not detectable by this analysis, or has a mutation in a different gene, either known or unknown. It is also possible there is a hereditary cancer predisposition in the family, but the patient did not inherit it.   Our knowledge of genetics and inherited cancer conditions is changing rapidly, therefore, we recommend that the patient contact our office, every 2 to 3 years, to discuss relevant advances in cancer genetics. We emphasize the importance of re-contacting us with updates regarding her personal and family history of cancer as well as any updates regarding additional cancer genetic test results performed for the patient and/or family members.  Such updates could possibly change our risk assessment and recommendations.   PLAN: 1. Long-term management and surveillance should be based on the patient's personal and family history and general population guidelines for all other cancers. 2. A copy of the genetic test results is available to the patient in the [email or web address] portal. 3. The patient was encouraged to contact us every 2-3 years to discuss relevant advances in cancer genetics, or sooner if there are any changes in her personal or family history of cancer.   For any additional questions please call Cancer Genetics at [phone] or [phone].   Ania Arias MS, [facility] [place]  Genetic Counselor, Cancer Genetics

## 2024-08-06 DIAGNOSIS — C50.911 MALIGNANT NEOPLASM OF UNSPECIFIED SITE OF RIGHT FEMALE BREAST: ICD-10-CM

## 2024-08-12 ENCOUNTER — APPOINTMENT (OUTPATIENT)
Dept: BREAST CENTER | Facility: CLINIC | Age: 64
End: 2024-08-12
Payer: MEDICARE

## 2024-08-12 VITALS
WEIGHT: 179 LBS | HEIGHT: 66 IN | SYSTOLIC BLOOD PRESSURE: 133 MMHG | BODY MASS INDEX: 28.77 KG/M2 | DIASTOLIC BLOOD PRESSURE: 70 MMHG

## 2024-08-12 PROCEDURE — 99024 POSTOP FOLLOW-UP VISIT: CPT

## 2024-08-12 NOTE — DATA REVIEWED
[FreeTextEntry1] : Date/Time:                   7/30/2024 10:51 EDT Received Date/Time:                    7/30/2024 11:53 EDT  Surgical Pathology Report - Auth (Verified)  Specimen(s) Submitted 1  Right breast lumpectomy Time obtained: 10:51 am Cold ischemic time <1 hour 2  Right breast final posterior margin Time obtained: 11:03 am Cold ischemic time <1 hour 3  Right breast final medial margin Time obtained: 11:04 am Cold ischemic time <1 hour 4  Right breast final inferior margin Time obtained: 11:05 am Cold ischemic time <1 hour 5  Right breast final lateral margin Time obtained: 11:06 am Cold ischemic time <1 hour 6  Right breast final superior margin Time obtained: 11:07 am Cold ischemic time <1 hour 7  Right breast final anterior margin Time obtained: 11:08 am Cold ischemic time <1 hour 8  Right breast sentinel node, #1 count 349  Time obtained: 11:18 am Cold ischemic time <1 hour  Final Diagnosis Breast, right; lumpectomy: - Invasive moderately differentiated ductal carcinoma with focal solid-papillary features. - [place] score 6/9 (3 + 2 + 1). - Invasive tumor measures 4 mm in greatest dimension. - Ductal carcinoma in situ (DCIS), cribriform pattern with intermediate grade nuclear atypia and central necrosis. - Margins: - All margins are negative (>2 mm) for invasive carcinoma. - All margins are negative (>2 mm) for ductal carcinoma in situ (DCIS). - See part 2-7 for final margins. - Lymphovascular permeation by tumor is not seen. - Uninvolved breast tissue shows radial scar,    sclerosing  adenosis, and fat necrosis. - Prior procedure related changes. - Please see synoptic summary for further details.  2. Breast, right final posterior margin; excision: - Benign fibroadipose tissue with fat necrosis.  3. Breast, right final medial margin; excision: - Benign fibroadipose tissue.  4. Breast, right final inferior margin; excision: - Benign fibroadipose tissue.  5.  Breast, right final lateral margin; excision: - Benign fibroadipose tissue.  6. Breast, right final superior margin; excision: - Benign fibroadipose tissue.  7.  Breast, right final anterior margin; excision: - Benign breast tissue.  8. Breast, right sentinel lymph node; biopsy:  - Three lymph nodes (0/3). Negative for carcinoma with evaluation of multiple H\T\E levels and with negative immunohistochemical stains for cytokeratins (CK AE1/AE3 and CAM 5.2).

## 2024-08-12 NOTE — HISTORY OF PRESENT ILLNESS
[FreeTextEntry1] : s/p R lumpectomy, SLN bx for T1N0M0 breast ca, ER,VA pos, Her2 neg. stage 1 breast ca healing well. path:  inal Diagnosis Breast, right; lumpectomy: - Invasive moderately differentiated ductal carcinoma with focal solid-papillary features. - Мар[place] score 6/9 (3 + 2 + 1). - Invasive tumor measures 4 mm in greatest dimension. - Ductal carcinoma in situ (DCIS), cribriform pattern with intermediate grade nuclear atypia and central necrosis. - Margins: - All margins are negative (>2 mm) for invasive carcinoma. - All margins are negative (>2 mm) for ductal carcinoma in situ (DCIS). - See part 2-7 for final margins. - Lymphovascular permeation by tumor is not seen. - Uninvolved breast tissue shows radial scar,    sclerosing  adenosis, and fat necrosis. - Prior procedure related changes. - Please see synoptic summary for further details.  2. Breast, right final posterior margin; excision: - Benign fibroadipose tissue with fat necrosis.  3. Breast, right final medial margin; excision: - Benign fibroadipose tissue.  4. Breast, right final inferior margin; excision: - Benign fibroadipose tissue.  5.  Breast, right final lateral margin; excision: - Benign fibroadipose tissue.  6. Breast, right final superior margin; excision: - Benign fibroadipose tissue.  7.  Breast, right final anterior margin; excision: - Benign breast tissue.  8. Breast, right sentinel lymph node; biopsy:  - Three lymph nodes (0/3). Negative for carcinoma with evaluation

## 2024-08-12 NOTE — ASSESSMENT
[FreeTextEntry1] : 62 y/o with stage 1 R breast ca. She will f/u with med onc and rad onc. candidate for MARCIAL trial. will do the FLEX registry and mammaprint  f/u R mammo in 6 m and f/u with PA

## 2024-08-12 NOTE — DATA REVIEWED
[FreeTextEntry1] : Date/Time:                   7/30/2024 10:51 EDT Received Date/Time:                    7/30/2024 11:53 EDT  Surgical Pathology Report - Auth (Verified)  Specimen(s) Submitted 1  Right breast lumpectomy Time obtained: 10:51 am Cold ischemic time <1 hour 2  Right breast final posterior margin Time obtained: 11:03 am Cold ischemic time <1 hour 3  Right breast final medial margin Time obtained: 11:04 am Cold ischemic time <1 hour 4  Right breast final inferior margin Time obtained: 11:05 am Cold ischemic time <1 hour 5  Right breast final lateral margin Time obtained: 11:06 am Cold ischemic time <1 hour 6  Right breast final superior margin Time obtained: 11:07 am Cold ischemic time <1 hour 7  Right breast final anterior margin Time obtained: 11:08 am Cold ischemic time <1 hour 8  Right breast sentinel node, #1 count 349  Time obtained: 11:18 am Cold ischemic time <1 hour  Final Diagnosis Breast, right; lumpectomy: - Invasive moderately differentiated ductal carcinoma with focal solid-papillary features. - [place] score 6/9 (3 + 2 + 1). - Invasive tumor measures 4 mm in greatest dimension. - Ductal carcinoma in situ (DCIS), cribriform pattern with intermediate grade nuclear atypia and central necrosis. - Margins: - All margins are negative (>2 mm) for invasive carcinoma. - All margins are negative (>2 mm) for ductal carcinoma in situ (DCIS). - See part 2-7 for final margins. - Lymphovascular permeation by tumor is not seen. - Uninvolved breast tissue shows radial scar,    sclerosing  adenosis, and fat necrosis. - Prior procedure related changes. - Please see synoptic summary for further details.  2. Breast, right final posterior margin; excision: - Benign fibroadipose tissue with fat necrosis.  3. Breast, right final medial margin; excision: - Benign fibroadipose tissue.  4. Breast, right final inferior margin; excision: - Benign fibroadipose tissue.  5.  Breast, right final lateral margin; excision: - Benign fibroadipose tissue.  6. Breast, right final superior margin; excision: - Benign fibroadipose tissue.  7.  Breast, right final anterior margin; excision: - Benign breast tissue.  8. Breast, right sentinel lymph node; biopsy:  - Three lymph nodes (0/3). Negative for carcinoma with evaluation of multiple H\T\E levels and with negative immunohistochemical stains for cytokeratins (CK AE1/AE3 and CAM 5.2).

## 2024-08-12 NOTE — HISTORY OF PRESENT ILLNESS
[FreeTextEntry1] : s/p R lumpectomy, SLN bx for T1N0M0 breast ca, ER,DC pos, Her2 neg. stage 1 breast ca healing well. path:  inal Diagnosis Breast, right; lumpectomy: - Invasive moderately differentiated ductal carcinoma with focal solid-papillary features. - Мария score 6/9 (3 + 2 + 1). - Invasive tumor measures 4 mm in greatest dimension. - Ductal carcinoma in situ (DCIS), cribriform pattern with intermediate grade nuclear atypia and central necrosis. - Margins: - All margins are negative (>2 mm) for invasive carcinoma. - All margins are negative (>2 mm) for ductal carcinoma in situ (DCIS). - See part 2-7 for final margins. - Lymphovascular permeation by tumor is not seen. - Uninvolved breast tissue shows radial scar,    sclerosing  adenosis, and fat necrosis. - Prior procedure related changes. - Please see synoptic summary for further details.  2. Breast, right final posterior margin; excision: - Benign fibroadipose tissue with fat necrosis.  3. Breast, right final medial margin; excision: - Benign fibroadipose tissue.  4. Breast, right final inferior margin; excision: - Benign fibroadipose tissue.  5.  Breast, right final lateral margin; excision: - Benign fibroadipose tissue.  6. Breast, right final superior margin; excision: - Benign fibroadipose tissue.  7.  Breast, right final anterior margin; excision: - Benign breast tissue.  8. Breast, right sentinel lymph node; biopsy:  - Three lymph nodes (0/3). Negative for carcinoma with evaluation

## 2024-08-13 NOTE — REASON FOR VISIT
PAST MEDICAL HISTORY:  Anxiety     Asthma     Fibromyalgia     Fibromyalgia     Iron deficiency     Overactive bladder      [Consideration of Curative Therapy] : consideration of curative therapy for [Breast Cancer] : breast cancer

## 2024-08-23 ENCOUNTER — APPOINTMENT (OUTPATIENT)
Dept: RADIATION ONCOLOGY | Facility: HOSPITAL | Age: 64
End: 2024-08-23
Payer: MEDICARE

## 2024-08-23 ENCOUNTER — OUTPATIENT (OUTPATIENT)
Dept: OUTPATIENT SERVICES | Facility: HOSPITAL | Age: 64
LOS: 1 days | End: 2024-08-23
Payer: MEDICARE

## 2024-08-23 VITALS
RESPIRATION RATE: 16 BRPM | WEIGHT: 176.13 LBS | HEIGHT: 66 IN | SYSTOLIC BLOOD PRESSURE: 121 MMHG | HEART RATE: 100 BPM | DIASTOLIC BLOOD PRESSURE: 73 MMHG | TEMPERATURE: 97.7 F | BODY MASS INDEX: 28.31 KG/M2 | OXYGEN SATURATION: 97 %

## 2024-08-23 DIAGNOSIS — Z98.890 OTHER SPECIFIED POSTPROCEDURAL STATES: Chronic | ICD-10-CM

## 2024-08-23 DIAGNOSIS — C50.911 MALIGNANT NEOPLASM OF UNSPECIFIED SITE OF RIGHT FEMALE BREAST: ICD-10-CM

## 2024-08-23 DIAGNOSIS — K21.9 GASTRO-ESOPHAGEAL REFLUX DISEASE W/OUT ESOPHAGITIS: ICD-10-CM

## 2024-08-23 DIAGNOSIS — Z90.89 ACQUIRED ABSENCE OF OTHER ORGANS: Chronic | ICD-10-CM

## 2024-08-23 DIAGNOSIS — Z90.49 ACQUIRED ABSENCE OF OTHER SPECIFIED PARTS OF DIGESTIVE TRACT: Chronic | ICD-10-CM

## 2024-08-23 DIAGNOSIS — C53.9 MALIGNANT NEOPLASM OF CERVIX UTERI, UNSPECIFIED: ICD-10-CM

## 2024-08-23 PROCEDURE — 99202 OFFICE O/P NEW SF 15 MIN: CPT

## 2024-08-25 NOTE — DISEASE MANAGEMENT
[Pathological] : TNM Stage: p [IA] : IA [FreeTextEntry4] : recurrent invasive ductal carcinoma, G2, ER/[place] +/ HER2 negative, UOQ [TTNM] : 1a [NTNM] : 0 [MTNM] : 0 [de-identified] : right breast

## 2024-08-25 NOTE — PHYSICAL EXAM
[Normal] : oriented to person, place and time, the affect was normal, the mood was normal and not anxious [de-identified] : deferred

## 2024-08-25 NOTE — DISEASE MANAGEMENT
[Pathological] : TNM Stage: p [IA] : IA [FreeTextEntry4] : recurrent invasive ductal carcinoma, G2, ER/MO +/ HER2 negative, UOQ [TTNM] : 1a [NTNM] : 0 [MTNM] : 0 [de-identified] : right breast

## 2024-08-25 NOTE — PHYSICAL EXAM
[Normal] : oriented to person, place and time, the affect was normal, the mood was normal and not anxious [de-identified] : deferred

## 2024-08-25 NOTE — REVIEW OF SYSTEMS
[Patient Intake Form Reviewed] : Patient intake form was reviewed [Fatigue] : fatigue [Loss of Hearing] : loss of hearing [Leg Claudication] : intermittent leg claudication [SOB on Exertion] : shortness of breath during exertion [Diarrhea] : diarrhea [Joint Pain] : joint pain [Skin Rash] : skin rash [Anxiety] : anxiety [Negative] : Allergic/Immunologic [Fever] : no fever [Chills] : no chills [Night Sweats] : no night sweats [Recent Change In Weight] : ~T no recent weight change [Dysphagia] : no dysphagia [Nosebleeds] : no nosebleeds [Hearing Disturbances] : no hearing disturbances [Chest Pain] : no chest pain [Palpitations] : no palpitations [Lower Ext Edema] : no lower extremity edema [Shortness Of Breath] : no shortness of breath [Wheezing] : no wheezing [Cough] : no cough [Abdominal Pain] : no abdominal pain [Vomiting] : no vomiting [Constipation] : no constipation [Muscle Pain] : no muscle pain [Muscle Weakness] : no muscle weakness [Gait Disturbance] : no gait disturbance [Skin Wound] : no skin wound [Suicidal] : not suicidal [Insomnia] : no insomnia [Depression] : no depression [FreeTextEntry4] : decreased hearing, tinnitus [FreeTextEntry5] : leg cramps [FreeTextEntry6] : asthma, increased mucus [FreeTextEntry7] : indigestion, takes Imodium for diarrhea as needed [FreeTextEntry9] : arthritis [de-identified] : eczema

## 2024-08-25 NOTE — PHYSICAL EXAM
[Normal] : oriented to person, place and time, the affect was normal, the mood was normal and not anxious [de-identified] : deferred

## 2024-08-25 NOTE — REVIEW OF SYSTEMS
[Patient Intake Form Reviewed] : Patient intake form was reviewed [Fatigue] : fatigue [Loss of Hearing] : loss of hearing [Leg Claudication] : intermittent leg claudication [SOB on Exertion] : shortness of breath during exertion [Diarrhea] : diarrhea [Joint Pain] : joint pain [Skin Rash] : skin rash [Anxiety] : anxiety [Negative] : Allergic/Immunologic [Fever] : no fever [Chills] : no chills [Night Sweats] : no night sweats [Recent Change In Weight] : ~T no recent weight change [Dysphagia] : no dysphagia [Nosebleeds] : no nosebleeds [Hearing Disturbances] : no hearing disturbances [Chest Pain] : no chest pain [Palpitations] : no palpitations [Lower Ext Edema] : no lower extremity edema [Shortness Of Breath] : no shortness of breath [Wheezing] : no wheezing [Cough] : no cough [Abdominal Pain] : no abdominal pain [Vomiting] : no vomiting [Constipation] : no constipation [Muscle Pain] : no muscle pain [Muscle Weakness] : no muscle weakness [Gait Disturbance] : no gait disturbance [Skin Wound] : no skin wound [Suicidal] : not suicidal [Insomnia] : no insomnia [Depression] : no depression [FreeTextEntry4] : decreased hearing, tinnitus [FreeTextEntry5] : leg cramps [FreeTextEntry6] : asthma, increased mucus [FreeTextEntry7] : indigestion, takes Imodium for diarrhea as needed [FreeTextEntry9] : arthritis [de-identified] : eczema

## 2024-08-25 NOTE — REVIEW OF SYSTEMS
[Patient Intake Form Reviewed] : Patient intake form was reviewed [Fatigue] : fatigue [Loss of Hearing] : loss of hearing [Leg Claudication] : intermittent leg claudication [SOB on Exertion] : shortness of breath during exertion [Diarrhea] : diarrhea [Joint Pain] : joint pain [Skin Rash] : skin rash [Anxiety] : anxiety [Negative] : Allergic/Immunologic [Fever] : no fever [Chills] : no chills [Night Sweats] : no night sweats [Recent Change In Weight] : ~T no recent weight change [Dysphagia] : no dysphagia [Nosebleeds] : no nosebleeds [Hearing Disturbances] : no hearing disturbances [Chest Pain] : no chest pain [Palpitations] : no palpitations [Lower Ext Edema] : no lower extremity edema [Shortness Of Breath] : no shortness of breath [Wheezing] : no wheezing [Cough] : no cough [Abdominal Pain] : no abdominal pain [Vomiting] : no vomiting [Constipation] : no constipation [Muscle Pain] : no muscle pain [Muscle Weakness] : no muscle weakness [Gait Disturbance] : no gait disturbance [Skin Wound] : no skin wound [Suicidal] : not suicidal [Insomnia] : no insomnia [Depression] : no depression [FreeTextEntry4] : decreased hearing, tinnitus [FreeTextEntry5] : leg cramps [FreeTextEntry6] : asthma, increased mucus [FreeTextEntry7] : indigestion, takes Imodium for diarrhea as needed [FreeTextEntry9] : arthritis [de-identified] : eczema

## 2024-08-25 NOTE — HISTORY OF PRESENT ILLNESS
[FreeTextEntry1] : The patient is a 63-year-old with a past history of right breast cancer s/p lumpectomy with Dr. Auguste (Dx 4/2015 DCIS, T1miN0, G2, ER/OK+/HER2- UOQ), s/p IORT with Dr. De Souza s/p AI, with letrozole managed by Dr. Hall, cervical cancer s/p concurrent chemoRT s/p XRT 9/2020 woman who was noted on screening mammogram (6/4/2024) to have mass in the right breast, UOQ.  Right Diagnostic mammogram and targeted ultrasound on 6/12/2024 showed indistinct hypoechoic mass in the right breast, 11:00, 4 cm from the nipple measures 0.5 x 0.4 x 0.3 cm. This corresponds to the mass seen on mammogram. There is no right axillary adenopathy.  Ultrasound-guided biopsy is recommended.BI-RADS Category 4: Suspicious  On 6/19/2024, she had a biopsy of the right breast abnormality at 11 o'clock and pathology showed, invasive well-differentiated ductal carcinoma with focal lobular features. Ductal carcinoma in situ.   It was ER/OK positive / HER 2 negative.  Ki-67 index was 5%.    On 7/30/2024, She underwent a right lumpectomy and sentinel node biopsy with Dr. Pyle.  She was found to have a 4 mm invasive ductal carcinoma, G2 with DCIS, ER/OK positive / HER 2 negative.  Surgical margins were negative as well as 0/3 negative sentinel lymph node.  There was no LVSI/PNI.    Genetic testing for BRAC 1-2 revealed negative results.    Side note: last PET CT completed on 7/2020 shows previous approximate 3.8 cm cervical mass is significantly decreased in size (approximately 3.1 cm) and is no longer FDG avid.  No FDG avid dwayne disease. Earlier FDG avid 8 mm left pelvic sidewall lymph node is no longer present.  She has been doing well since surgery.  She is here to discuss radiation for recurrent breast cancer.   Med/Onc: Dr. Hall/ACP 9/5/2024

## 2024-08-25 NOTE — PHYSICAL EXAM
[Normal] : oriented to person, place and time, the affect was normal, the mood was normal and not anxious [de-identified] : deferred

## 2024-08-25 NOTE — DISEASE MANAGEMENT
[Pathological] : TNM Stage: p [IA] : IA [FreeTextEntry4] : recurrent invasive ductal carcinoma, G2, ER/[place] +/ HER2 negative, UOQ [TTNM] : 1a [NTNM] : 0 [MTNM] : 0 [de-identified] : right breast

## 2024-08-25 NOTE — REVIEW OF SYSTEMS
[Patient Intake Form Reviewed] : Patient intake form was reviewed [Fatigue] : fatigue [Loss of Hearing] : loss of hearing [Leg Claudication] : intermittent leg claudication [SOB on Exertion] : shortness of breath during exertion [Diarrhea] : diarrhea [Joint Pain] : joint pain [Skin Rash] : skin rash [Anxiety] : anxiety [Negative] : Allergic/Immunologic [Fever] : no fever [Chills] : no chills [Night Sweats] : no night sweats [Recent Change In Weight] : ~T no recent weight change [Dysphagia] : no dysphagia [Nosebleeds] : no nosebleeds [Hearing Disturbances] : no hearing disturbances [Chest Pain] : no chest pain [Palpitations] : no palpitations [Lower Ext Edema] : no lower extremity edema [Shortness Of Breath] : no shortness of breath [Wheezing] : no wheezing [Cough] : no cough [Abdominal Pain] : no abdominal pain [Vomiting] : no vomiting [Constipation] : no constipation [Muscle Pain] : no muscle pain [Muscle Weakness] : no muscle weakness [Gait Disturbance] : no gait disturbance [Skin Wound] : no skin wound [Suicidal] : not suicidal [Insomnia] : no insomnia [Depression] : no depression [FreeTextEntry4] : decreased hearing, tinnitus [FreeTextEntry5] : leg cramps [FreeTextEntry6] : asthma, increased mucus [FreeTextEntry7] : indigestion, takes Imodium for diarrhea as needed [FreeTextEntry9] : arthritis [de-identified] : eczema

## 2024-08-25 NOTE — DISEASE MANAGEMENT
[Pathological] : TNM Stage: p [IA] : IA [FreeTextEntry4] : recurrent invasive ductal carcinoma, G2, ER/[place] +/ HER2 negative, UOQ [TTNM] : 1a [NTNM] : 0 [MTNM] : 0 [de-identified] : right breast

## 2024-08-25 NOTE — HISTORY OF PRESENT ILLNESS
[FreeTextEntry1] : The patient is a 63-year-old with a past history of right breast cancer s/p lumpectomy with Dr. Auguste (Dx 4/2015 DCIS, T1miN0, G2, ER/VT+/HER2- UOQ), s/p IORT with Dr. De Souza s/p AI, with letrozole managed by Dr. Hall, cervical cancer s/p concurrent chemoRT s/p XRT 9/2020 woman who was noted on screening mammogram (6/4/2024) to have mass in the right breast, UOQ.  Right Diagnostic mammogram and targeted ultrasound on 6/12/2024 showed indistinct hypoechoic mass in the right breast, 11:00, 4 cm from the nipple measures 0.5 x 0.4 x 0.3 cm. This corresponds to the mass seen on mammogram. There is no right axillary adenopathy.  Ultrasound-guided biopsy is recommended.BI-RADS Category 4: Suspicious  On 6/19/2024, she had a biopsy of the right breast abnormality at 11 o'clock and pathology showed, invasive well-differentiated ductal carcinoma with focal lobular features. Ductal carcinoma in situ.   It was ER/[place] positive / HER 2 negative.  Ki-67 index was 5%.    On 7/30/2024, She underwent a right lumpectomy and sentinel node biopsy with Dr. Pyle.  She was found to have a 4 mm invasive ductal carcinoma, G2 with DCIS, ER/VT positive / HER 2 negative.  Surgical margins were negative as well as 0/3 negative sentinel lymph node.  There was no LVSI/PNI.    Genetic testing for BRAC 1-2 revealed negative results.    Side note: last PET CT completed on 7/2020 shows previous approximate 3.8 cm cervical mass is significantly decreased in size (approximately 3.1 cm) and is no longer FDG avid.  No FDG avid dwayne disease. Earlier FDG avid 8 mm left pelvic sidewall lymph node is no longer present.  She has been doing well since surgery.  She is here to discuss radiation for recurrent breast cancer.   Med/Onc: Dr. Hall/ACP 9/5/2024

## 2024-08-25 NOTE — HISTORY OF PRESENT ILLNESS
[FreeTextEntry1] : The patient is a 63-year-old with a past history of right breast cancer s/p lumpectomy with Dr. Auguste (Dx 4/2015 DCIS, T1miN0, G2, ER/MO+/HER2- UOQ), s/p IORT with Dr. De Souza s/p AI, with letrozole managed by Dr. Hall, cervical cancer s/p concurrent chemoRT s/p XRT 9/2020 woman who was noted on screening mammogram (6/4/2024) to have mass in the right breast, UOQ.  Right Diagnostic mammogram and targeted ultrasound on 6/12/2024 showed indistinct hypoechoic mass in the right breast, 11:00, 4 cm from the nipple measures 0.5 x 0.4 x 0.3 cm. This corresponds to the mass seen on mammogram. There is no right axillary adenopathy.  Ultrasound-guided biopsy is recommended.BI-RADS Category 4: Suspicious  On 6/19/2024, she had a biopsy of the right breast abnormality at 11 o'clock and pathology showed, invasive well-differentiated ductal carcinoma with focal lobular features. Ductal carcinoma in situ.   It was ER/MO positive / HER 2 negative.  Ki-67 index was 5%.    On 7/30/2024, She underwent a right lumpectomy and sentinel node biopsy with Dr. Pyle.  She was found to have a 4 mm invasive ductal carcinoma, G2 with DCIS, ER/MO positive / HER 2 negative.  Surgical margins were negative as well as 0/3 negative sentinel lymph node.  There was no LVSI/PNI.    Genetic testing for BRAC 1-2 revealed negative results.    Side note: last PET CT completed on 7/2020 shows previous approximate 3.8 cm cervical mass is significantly decreased in size (approximately 3.1 cm) and is no longer FDG avid.  No FDG avid dwayne disease. Earlier FDG avid 8 mm left pelvic sidewall lymph node is no longer present.  She has been doing well since surgery.  She is here to discuss radiation for recurrent breast cancer.   Med/Onc: Dr. Hall/ACP 9/5/2024

## 2024-08-25 NOTE — HISTORY OF PRESENT ILLNESS
[FreeTextEntry1] : The patient is a 63-year-old with a past history of right breast cancer s/p lumpectomy with Dr. Auguste (Dx 4/2015 DCIS, T1miN0, G2, ER/[place]+/HER2- UOQ), s/p IORT with Dr. De Souza s/p AI, with letrozole managed by Dr. Hall, cervical cancer s/p concurrent chemoRT s/p XRT 9/2020 woman who was noted on screening mammogram (6/4/2024) to have mass in the right breast, UOQ.  Right Diagnostic mammogram and targeted ultrasound on 6/12/2024 showed indistinct hypoechoic mass in the right breast, 11:00, 4 cm from the nipple measures 0.5 x 0.4 x 0.3 cm. This corresponds to the mass seen on mammogram. There is no right axillary adenopathy.  Ultrasound-guided biopsy is recommended.BI-RADS Category 4: Suspicious  On 6/19/2024, she had a biopsy of the right breast abnormality at 11 o'clock and pathology showed, invasive well-differentiated ductal carcinoma with focal lobular features. Ductal carcinoma in situ.   It was ER/[place] positive / HER 2 negative.  Ki-67 index was 5%.    On 7/30/2024, She underwent a right lumpectomy and sentinel node biopsy with Dr. Pyle.  She was found to have a 4 mm invasive ductal carcinoma, G2 with DCIS, ER/[place] positive / HER 2 negative.  Surgical margins were negative as well as 0/3 negative sentinel lymph node.  There was no LVSI/PNI.    Genetic testing for BRAC 1-2 revealed negative results.    Side note: last PET CT completed on 7/2020 shows previous approximate 3.8 cm cervical mass is significantly decreased in size (approximately 3.1 cm) and is no longer FDG avid.  No FDG avid dwayne disease. Earlier FDG avid 8 mm left pelvic sidewall lymph node is no longer present.  She has been doing well since surgery.  She is here to discuss radiation for recurrent breast cancer.   Med/Onc: Dr. Hall/ACP 9/5/2024

## 2024-08-26 ENCOUNTER — NON-APPOINTMENT (OUTPATIENT)
Age: 64
End: 2024-08-26

## 2024-08-26 DIAGNOSIS — C53.0 MALIGNANT NEOPLASM OF ENDOCERVIX: ICD-10-CM

## 2024-08-26 DIAGNOSIS — C50.411 MALIGNANT NEOPLASM OF UPPER-OUTER QUADRANT OF RIGHT FEMALE BREAST: ICD-10-CM

## 2024-08-28 NOTE — ASU PREOP CHECKLIST - LOOSE TEETH
PAST MEDICAL HISTORY:  Neuroendocrine carcinoma of pancreas s/p resection 2011; with anastomosis of pancreatic tail to stomach    
no

## 2024-09-05 ENCOUNTER — APPOINTMENT (OUTPATIENT)
Age: 64
End: 2024-09-05

## 2024-09-12 ENCOUNTER — OUTPATIENT (OUTPATIENT)
Dept: OUTPATIENT SERVICES | Facility: HOSPITAL | Age: 64
LOS: 1 days | End: 2024-09-12
Payer: MEDICARE

## 2024-09-12 ENCOUNTER — APPOINTMENT (OUTPATIENT)
Age: 64
End: 2024-09-12

## 2024-09-12 VITALS
RESPIRATION RATE: 17 BRPM | SYSTOLIC BLOOD PRESSURE: 114 MMHG | TEMPERATURE: 98 F | HEART RATE: 121 BPM | HEIGHT: 66 IN | WEIGHT: 179 LBS | DIASTOLIC BLOOD PRESSURE: 82 MMHG | BODY MASS INDEX: 28.77 KG/M2 | OXYGEN SATURATION: 95 %

## 2024-09-12 DIAGNOSIS — C53.9 MALIGNANT NEOPLASM OF CERVIX UTERI, UNSPECIFIED: ICD-10-CM

## 2024-09-12 DIAGNOSIS — Z79.811 LONG TERM (CURRENT) USE OF AROMATASE INHIBITORS: ICD-10-CM

## 2024-09-12 DIAGNOSIS — C50.911 MALIGNANT NEOPLASM OF UNSPECIFIED SITE OF RIGHT FEMALE BREAST: ICD-10-CM

## 2024-09-12 DIAGNOSIS — Z90.89 ACQUIRED ABSENCE OF OTHER ORGANS: Chronic | ICD-10-CM

## 2024-09-12 DIAGNOSIS — Z98.890 OTHER SPECIFIED POSTPROCEDURAL STATES: Chronic | ICD-10-CM

## 2024-09-12 DIAGNOSIS — Z90.49 ACQUIRED ABSENCE OF OTHER SPECIFIED PARTS OF DIGESTIVE TRACT: Chronic | ICD-10-CM

## 2024-09-12 PROCEDURE — G2211 COMPLEX E/M VISIT ADD ON: CPT

## 2024-09-12 PROCEDURE — 99215 OFFICE O/P EST HI 40 MIN: CPT

## 2024-09-12 RX ORDER — EXEMESTANE 25 MG/1
25 TABLET, FILM COATED ORAL
Qty: 90 | Refills: 1 | Status: ACTIVE | COMMUNITY
Start: 2024-09-12 | End: 1900-01-01

## 2024-09-13 DIAGNOSIS — C53.9 MALIGNANT NEOPLASM OF CERVIX UTERI, UNSPECIFIED: ICD-10-CM

## 2024-09-20 NOTE — PHYSICAL EXAM
[Fully active, able to carry on all pre-disease performance without restriction] : Status 0 - Fully active, able to carry on all pre-disease performance without restriction [Normal] : affect appropriate [de-identified] : Overweight [de-identified] : Right breast lumpectomy scar noted, bilateral breast exam today is unrevealing. [de-identified] : Abdominal scar noted

## 2024-09-20 NOTE — HISTORY OF PRESENT ILLNESS
[Disease: _____________________] : Disease: [unfilled] [T: ___] : T[unfilled] [N: ___] : N[unfilled] [M: ___] : M[unfilled] [AJCC Stage: ____] : AJCC Stage: [unfilled] [de-identified] : Patient is a 60 year old year-old postmenopausal female transferring her care to me.   She has history of ER and [place] positive 0.6 mm well, differentiated, microinvasive ductal carcinoma  ER/VA (+), HER2 (-). status post surgery currently on adjuvant Femara started in July 2015.  She is s/p Right NLOC/SNB/RF Spect/XOFT 06/15/15 - 0/1 (-); DCIS intermed grade, no residual IDC; (-) margins Patient is taking calcium and vitamin D . She is refusing Pap smears. No FHx breast/ovarian cancer.  [de-identified] : IDC [de-identified] : ER and [place] positive, and her-2 negative [de-identified] : < 1mm (microinvasive)\par  UOQ [de-identified] : She has not been here since 11/18/16. However she states she has been compliant with Femara. She does not exercise. Her appetite is good. She denies any adverse events.  7/30/18: Doing well. Mammo in July 2018 was normal. DEXA in July 2018 showed osteopenia.  C/o burning and pain while passing urine. Started few days ago, resolved by itself and recurred last night. Did not take any Abx for UTI.  Compliant with Letrozole. Denies fever, nausea, vomiting, chest pain, SOB, abdominal pain, bowel problems. Colonoscopy done in 2017 and it was normal.   01/28/2019 Patient is here for a follow up visit.  C/O of weight gain and increased appetite. But otherwise tolerating letrozole well.  Mammogram due in  july 2019.  Denies fever, nausea, vomiting, chest pain, SOB, abdominal pain, bowel problems. Colonoscopy done in 2017 and it was normal.   7/8/19 pt is here for follow up. Denies fever, nausea, vomiting, chest pain, SOB, abdominal pain, bowel problems. she had felt a lump under her left axilla, had mamamo/usg done neg as noted below. compliant with Femara. says she was diagnosed with iron deficiency last October??. states she was advised IV iron but has been taking po iron. Not taking Ca + D After extensive questioning pt finally admitted that she has been having vaginal spotting for several months. She has not seen a GYN in a long time  2/10/20  Patient here for follow up.  She denies any new complaints.  Patient denies any new palpable breast lumps or pain, denies skin changes, denies nipple discharge.  Patient denies cough, shortness of breath, denies fever, denies bone pain. compliant with letrozole. Still has vaginal spotting off and on. Did not go for USG of pelvis or GYN eval as advised previously. Pt states that this is not her first priority. Continues to smoke. No abdominal pain  4/13/2020: The patient is here for follow up . She remains on letrozole since July 2015. She denies fever, chills, no weight loss, no chest pain or shortness of breath . She is actively smoking. 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 She underwent conization with endometrial biopsy and vaginal exam on 3/2/2020 . 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 The patient is still reporting vaginal spotting intermittently  5/7/20 Pt is here for follow up. She has completed MRI pelvis and was seen in Rad/Onc. No new symptoms  06/02/2020 JUAN ANTONIO OSHEA a 59 year F is here today for follow up of Breast Ca and cervical cancer. Has been complaint with femara since 9/2015 vitamin and calcium.  Pt does not want to continue Femara while on Chemo and RT.  Patient denies any new palpable breast lumps or pain, denies skin changes, denies nipple discharge.  Denies vaginal bleeding, bloating, abdominal pain. Denies dysuria, fever, chills.  She started RT today and will start weekly cisplatin today.   She had PET on 5/14: Focal FDG activity, proximal left lower lobe associated with approximate 8 mm branch point nodule (axial image 195, max SUV 4.2). Follow-up diagnostic CT scan 3 months time recommended. New FDG avid 8 mm left pelvic sidewall lymph node (axial 103, max SUV 9.8), suspicious for biologic tumor activity.  CBC reviewed.   6/8/20 Pt is here for follow up. She is s/p 1 weekly dose of Cisplatin which she tolerated well. Her only complaint is of heart burn since last week. In addition she has been experiencing insomnia. No N, V, D. Had mild vaginal bleeding. No abd pain.  6/15/20 Pt is here for follow up. Is s/p 2 weekly treatments of cisplatin. Tolerating it well. No N or V. Had diarrhea last week which resolved spontaneously. No abd pain or vag bleeding. No fever. C/o insomnia, has been drinking a lot of caffeinated beverages  6/29/20 pT is here for follow up. Tolerating chemo well. No reports of N, V. Has loose BMs occasionally well managed with Imodium. No vag bleeding  7/6/20 Pt is here for follow up. No new complaints. Will be finishing RT tomorrow. No vag bleeding or pelvic pain. No fever, N, V, D compliant with femara  7/13/20- Juan Antonio is here for for follow up. She remains on femara, Ca and Vitamin D,. she still has not got the US breast yet. She continues to smoke. She completed radiation EBRT and is planned for brachytherapy. Her appetite is good. With the last treatment she started having diarrhea, nausea which is better when she takes prn meds. Pepcid helping with reflux No vaginal bleeding.   9/1/2020 Patient is here for a follow-up visit for Breast and cervical cancer.   Patient denies fever, chills, nausea, vomiting, new pain or bleeding. She continues to take Femara, Calcium and Vitamin D.  She is approaching 5 years on Femara at the end of Sept 2020. She is anticipating brachytherapy with Dr. Delacruz.   DEXA showed osteopenia in AP spine, otherwise normal.   Patient reports she was unable to perform 2nd brachtherapy dose due to issues with IV access but is scheduled again for later this week. Reviewed most recent diagnostic mammo + sono from 08/24/2020 which was BI-RADS 2: Benign.  10/13/20 Pt is here for follow up. Has completed RT and chemo for cervical cancer. No vaginal bleeding. No abd pain, N, V,. D No breast related complaints.  12/17/20 Pt is here for follow up via teleheath. Has no new complaints. Denies vaginal bleeding, abdominal pain, N, V. D, urinary symptoms. Appetite is good. No bowel issues  2/23/2021 Patient is here to follow up for breast cancer and cervical cancer She is feeling well today, denies any new breast lump or pain, skin changes or nipple discharge She denies abdominal pain, bloating, nausea, vomiting, vaginal bleeding or discharge She completed RT in September 2020 She is updated with mammogram and bone density  6/22/021 Pt is here to follow up for breast and cervical cancer She feels well today. appetite is good She denies any new breast lump/mass, skin changes or nipple discharge, no abdominal pain, bloating, vaginal discharge or bleeding She received 2 doses of COVID vaccine She is UTD with mammo and gyne  11/28/22 Pt is here for a follow up after a long hiatus of over a year. denies vaginal bleeding, pelvic pain, breast masses. Has not seen GYN in over a year  5/16/23 Patient is here to follow up for breast and cervical cancer. She feels well, denies any breast related symptoms. She denies abdominal pain, nausea, vomiting or vaginal bleeding/abnormal discharge. She has not seen gyne since 2/2021.  11/14/23 Patient is here to follow up for breast and cervical cancer. She feels well, denies any breast related symptoms. She denies abdominal pain, nausea, vomiting or vaginal bleeding/abnormal discharge. She has not seen gyne since 2/2021, last colonoscopy? She is UTD with PCP. She had just seen PCP yesterday due to asthma and was started on Prednisone and antibiotics. She is due for mammogram. She smokes 5 cigs/day, declined to be referred to Smoking Cessation .  5/28/24 Patient is here to follow up for breast and cervical cancer. She feels well, denies any breast related symptoms. She denies abdominal pain, nausea, vomiting or vaginal bleeding/abnormal discharge. She has not seen gyne since 2/2021, last colonoscopy? She is UTD with PCP.  She is due for mammogram. Pt still has to make appt. She smokes 5 cigs/day, agreed to seek referral [facility] [phone]  7/25/24 Patient is here to follow up for breast cancer prior to her lumpectomy with Dr. Pyle. She feels well, denies any breast related symptoms. She denies abdominal pain, nausea, vomiting or vaginal bleeding/abnormal discharge. However, last mammo/breast US on 6/12/24 showed BIRAD4.   6/19/2024 Breast, right mass, ultrasound-guided needle core biopsies: (top-hat) - Invasive well-differentiated ductal carcinoma with focal lobular features. Ductal carcinoma in situ (DCIS), solid and cribriform types with microcalcifications low nuclear grade.  9/12/24 Pt is here today for follow up visit. She had lumpectomy 7/30/24 with Dr. Pyle- tolerated well, no complications Pt declines radiation but will consider endocrine therapy hx of right breast cancer (IORT in 2015), cervical cancer s/p definitive chemoRT in 2020 now presenting with a new pT1aN0 IDC of the right breast, G2, ER/DC positive, HER2 negative, near the prior tumor bed. She is s/p lumpectomy on 7/30/24 with negative margins and negative SLNs.

## 2024-09-20 NOTE — ASSESSMENT
[FreeTextEntry1] : Patient is a 63 year old postmenopausal female with:  1) Microinvasive hormone receptor positive breast cancer, S/P adjuvant therapy with Femara x 5 years, 9/2015 - 9/2020, WANDA 2) Stage IIA1 Poorly to Moderately Differentiated Squamous Cell carcinoma of cervix, S/P chemo/RT on 9/2020  PLAN: Previous notes reviewed and all relevant laboratory and radiology results discussed with and communicated to the patient.  #H/O microinvasive hormone receptor positive breast cancer   last mammo/breast US on 6/12/24 showed BIRAD4.   6/19/2024 Breast, right mass, ultrasound-guided needle core biopsies: (top-hat) - Invasive well-differentiated ductal carcinoma with focal lobular features. Ductal carcinoma in situ (DCIS), solid and cribriform types with microcalcifications low nuclear grade. Rt breast lumpectomy done with Dr. Pyle on 7/30/24.  We also discussed endocrine therapy with Aromatase Inhibitors, such as, Exemestane. We discussed the side effects which may include but not limit to muscular and skeletal aching, arthralgia, loss of bone density, osteopenia and osteoporosis, a small increase risk of cardiovascular events and slightly increase of hyperlipidemia. Exemestane was e-prescribed.  - Continue to follow up with PCP and GI for health maintenance.  #Stage IIA1 poorly to moderately differentiated squamous cell carcinoma of cervix - Repeat PET scan (12/7/2020) showed favorable response although the cervical mass is not regressed significantly, FDG neg though. - Emphasized to follow up with gyn/onc.  #Current smoker - Healthy lifestyle with emphasis on smoking cessation discussed. Pt agreed to seek referral [facility] [phone] last visit but did not go.  RTC in 6 weeks with Dr. Hall.  Case was seen and discussed with Dr. Hall who agreed with the assessment and plan.

## 2024-09-20 NOTE — PHYSICAL EXAM
[Fully active, able to carry on all pre-disease performance without restriction] : Status 0 - Fully active, able to carry on all pre-disease performance without restriction [Normal] : affect appropriate [de-identified] : Overweight [de-identified] : Right breast lumpectomy scar noted, bilateral breast exam today is unrevealing. [de-identified] : Abdominal scar noted

## 2024-09-20 NOTE — ASSESSMENT
[FreeTextEntry1] : Patient is a 63 year old postmenopausal female with:  1) Microinvasive hormone receptor positive breast cancer, S/P adjuvant therapy with Femara x 5 years, 9/2015 - 9/2020, WANDA 2) Stage IIA1 Poorly to Moderately Differentiated Squamous Cell carcinoma of cervix, S/P chemo/RT on 9/2020  PLAN: Previous notes reviewed and all relevant laboratory and radiology results discussed with and communicated to the patient.  #H/O microinvasive hormone receptor positive breast cancer   last mammo/breast US on 6/12/24 showed BIRAD4.   6/19/2024 Breast, right mass, ultrasound-guided needle core biopsies: (top-hat) - Invasive well-differentiated ductal carcinoma with focal lobular features. Ductal carcinoma in situ (DCIS), solid and cribriform types with microcalcifications low nuclear grade. Rt breast lumpectomy done with Dr. Pyle on 7/30/24.  We also discussed endocrine therapy with Aromatase Inhibitors, such as, Exemestane. We discussed the side effects which may include but not limit to muscular and skeletal aching, arthralgia, loss of bone density, osteopenia and osteoporosis, a small increase risk of cardiovascular events and slightly increase of hyperlipidemia. Exemestane was e-prescribed.  - Continue to follow up with PCP and GI for health maintenance.  #Stage IIA1 poorly to moderately differentiated squamous cell carcinoma of cervix - Repeat PET scan (12/7/2020) showed favorable response although the cervical mass is not regressed significantly, FDG neg though. - Emphasized to follow up with gyn/onc.  #Current smoker - Healthy lifestyle with emphasis on smoking cessation discussed. Pt agreed to seek referral [facility]. [phone] last visit but did not go.  RTC in 6 weeks with Dr. Hall.  Case was seen and discussed with Dr. Hall who agreed with the assessment and plan.

## 2024-09-20 NOTE — HISTORY OF PRESENT ILLNESS
[Disease: _____________________] : Disease: [unfilled] [T: ___] : T[unfilled] [N: ___] : N[unfilled] [M: ___] : M[unfilled] [AJCC Stage: ____] : AJCC Stage: [unfilled] [de-identified] : Patient is a 60 year old year-old postmenopausal female transferring her care to me.   She has history of ER and [place] positive 0.6 mm well, differentiated, microinvasive ductal carcinoma  ER/MD (+), HER2 (-). status post surgery currently on adjuvant Femara started in July 2015.  She is s/p Right NLOC/SNB/RF Spect/XOFT 06/15/15 - 0/1 (-); DCIS intermed grade, no residual IDC; (-) margins Patient is taking calcium and vitamin D . She is refusing Pap smears. No FHx breast/ovarian cancer.  [de-identified] : IDC [de-identified] : ER and [place] positive, and her-2 negative [de-identified] : < 1mm (microinvasive)\par  UOQ [de-identified] : She has not been here since 11/18/16. However she states she has been compliant with Femara. She does not exercise. Her appetite is good. She denies any adverse events.  7/30/18: Doing well. Mammo in July 2018 was normal. DEXA in July 2018 showed osteopenia.  C/o burning and pain while passing urine. Started few days ago, resolved by itself and recurred last night. Did not take any Abx for UTI.  Compliant with Letrozole. Denies fever, nausea, vomiting, chest pain, SOB, abdominal pain, bowel problems. Colonoscopy done in 2017 and it was normal.   01/28/2019 Patient is here for a follow up visit.  C/O of weight gain and increased appetite. But otherwise tolerating letrozole well.  Mammogram due in  july 2019.  Denies fever, nausea, vomiting, chest pain, SOB, abdominal pain, bowel problems. Colonoscopy done in 2017 and it was normal.   7/8/19 pt is here for follow up. Denies fever, nausea, vomiting, chest pain, SOB, abdominal pain, bowel problems. she had felt a lump under her left axilla, had mamamo/usg done neg as noted below. compliant with Femara. says she was diagnosed with iron deficiency last October??. states she was advised IV iron but has been taking po iron. Not taking Ca + D After extensive questioning pt finally admitted that she has been having vaginal spotting for several months. She has not seen a GYN in a long time  2/10/20  Patient here for follow up.  She denies any new complaints.  Patient denies any new palpable breast lumps or pain, denies skin changes, denies nipple discharge.  Patient denies cough, shortness of breath, denies fever, denies bone pain. compliant with letrozole. Still has vaginal spotting off and on. Did not go for USG of pelvis or GYN eval as advised previously. Pt states that this is not her first priority. Continues to smoke. No abdominal pain  4/13/2020: The patient is here for follow up . She remains on letrozole since July 2015. She denies fever, chills, no weight loss, no chest pain or shortness of breath . She is actively smoking. 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 She underwent conization with endometrial biopsy and vaginal exam on 3/2/2020 . 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 The patient is still reporting vaginal spotting intermittently  5/7/20 Pt is here for follow up. She has completed MRI pelvis and was seen in Rad/Onc. No new symptoms  06/02/2020 JUAN ANTONIO OSHEA a 59 year F is here today for follow up of Breast Ca and cervical cancer. Has been complaint with femara since 9/2015 vitamin and calcium.  Pt does not want to continue Femara while on Chemo and RT.  Patient denies any new palpable breast lumps or pain, denies skin changes, denies nipple discharge.  Denies vaginal bleeding, bloating, abdominal pain. Denies dysuria, fever, chills.  She started RT today and will start weekly cisplatin today.   She had PET on 5/14: Focal FDG activity, proximal left lower lobe associated with approximate 8 mm branch point nodule (axial image 195, max SUV 4.2). Follow-up diagnostic CT scan 3 months time recommended. New FDG avid 8 mm left pelvic sidewall lymph node (axial 103, max SUV 9.8), suspicious for biologic tumor activity.  CBC reviewed.   6/8/20 Pt is here for follow up. She is s/p 1 weekly dose of Cisplatin which she tolerated well. Her only complaint is of heart burn since last week. In addition she has been experiencing insomnia. No N, V, D. Had mild vaginal bleeding. No abd pain.  6/15/20 Pt is here for follow up. Is s/p 2 weekly treatments of cisplatin. Tolerating it well. No N or V. Had diarrhea last week which resolved spontaneously. No abd pain or vag bleeding. No fever. C/o insomnia, has been drinking a lot of caffeinated beverages  6/29/20 pT is here for follow up. Tolerating chemo well. No reports of N, V. Has loose BMs occasionally well managed with Imodium. No vag bleeding  7/6/20 Pt is here for follow up. No new complaints. Will be finishing RT tomorrow. No vag bleeding or pelvic pain. No fever, N, V, D compliant with femara  7/13/20- Juan Antonio is here for for follow up. She remains on femara, Ca and Vitamin D,. she still has not got the US breast yet. She continues to smoke. She completed radiation EBRT and is planned for brachytherapy. Her appetite is good. With the last treatment she started having diarrhea, nausea which is better when she takes prn meds. Pepcid helping with reflux No vaginal bleeding.   9/1/2020 Patient is here for a follow-up visit for Breast and cervical cancer.   Patient denies fever, chills, nausea, vomiting, new pain or bleeding. She continues to take Femara, Calcium and Vitamin D.  She is approaching 5 years on Femara at the end of Sept 2020. She is anticipating brachytherapy with Dr. Delacruz.   DEXA showed osteopenia in AP spine, otherwise normal.   Patient reports she was unable to perform 2nd brachtherapy dose due to issues with IV access but is scheduled again for later this week. Reviewed most recent diagnostic mammo + sono from 08/24/2020 which was BI-RADS 2: Benign.  10/13/20 Pt is here for follow up. Has completed RT and chemo for cervical cancer. No vaginal bleeding. No abd pain, N, V,. D No breast related complaints.  12/17/20 Pt is here for follow up via teleheath. Has no new complaints. Denies vaginal bleeding, abdominal pain, N, V. D, urinary symptoms. Appetite is good. No bowel issues  2/23/2021 Patient is here to follow up for breast cancer and cervical cancer She is feeling well today, denies any new breast lump or pain, skin changes or nipple discharge She denies abdominal pain, bloating, nausea, vomiting, vaginal bleeding or discharge She completed RT in September 2020 She is updated with mammogram and bone density  6/22/021 Pt is here to follow up for breast and cervical cancer She feels well today. appetite is good She denies any new breast lump/mass, skin changes or nipple discharge, no abdominal pain, bloating, vaginal discharge or bleeding She received 2 doses of COVID vaccine She is UTD with mammo and gyne  11/28/22 Pt is here for a follow up after a long hiatus of over a year. denies vaginal bleeding, pelvic pain, breast masses. Has not seen GYN in over a year  5/16/23 Patient is here to follow up for breast and cervical cancer. She feels well, denies any breast related symptoms. She denies abdominal pain, nausea, vomiting or vaginal bleeding/abnormal discharge. She has not seen gyne since 2/2021.  11/14/23 Patient is here to follow up for breast and cervical cancer. She feels well, denies any breast related symptoms. She denies abdominal pain, nausea, vomiting or vaginal bleeding/abnormal discharge. She has not seen gyne since 2/2021, last colonoscopy? She is UTD with PCP. She had just seen PCP yesterday due to asthma and was started on Prednisone and antibiotics. She is due for mammogram. She smokes 5 cigs/day, declined to be referred to Smoking Cessation .  5/28/24 Patient is here to follow up for breast and cervical cancer. She feels well, denies any breast related symptoms. She denies abdominal pain, nausea, vomiting or vaginal bleeding/abnormal discharge. She has not seen gyne since 2/2021, last colonoscopy? She is UTD with PCP.  She is due for mammogram. Pt still has to make appt. She smokes 5 cigs/day, agreed to seek referral [facility] [phone]  7/25/24 Patient is here to follow up for breast cancer prior to her lumpectomy with Dr. Pyle. She feels well, denies any breast related symptoms. She denies abdominal pain, nausea, vomiting or vaginal bleeding/abnormal discharge. However, last mammo/breast US on 6/12/24 showed BIRAD4.   6/19/2024 Breast, right mass, ultrasound-guided needle core biopsies: (top-hat) - Invasive well-differentiated ductal carcinoma with focal lobular features. Ductal carcinoma in situ (DCIS), solid and cribriform types with microcalcifications low nuclear grade.  9/12/24 Pt is here today for follow up visit. She had lumpectomy 7/30/24 with Dr. Pyle- tolerated well, no complications Pt declines radiation but will consider endocrine therapy hx of right breast cancer (IORT in 2015), cervical cancer s/p definitive chemoRT in 2020 now presenting with a new pT1aN0 IDC of the right breast, G2, ER/[place] positive, HER2 negative, near the prior tumor bed. She is s/p lumpectomy on 7/30/24 with negative margins and negative SLNs.

## 2024-09-20 NOTE — PHYSICAL EXAM
[Fully active, able to carry on all pre-disease performance without restriction] : Status 0 - Fully active, able to carry on all pre-disease performance without restriction [Normal] : affect appropriate [de-identified] : Overweight [de-identified] : Right breast lumpectomy scar noted, bilateral breast exam today is unrevealing. [de-identified] : Abdominal scar noted

## 2024-09-20 NOTE — ASSESSMENT
[FreeTextEntry1] : Patient is a 63 year old postmenopausal female with:  1) Microinvasive hormone receptor positive breast cancer, S/P adjuvant therapy with Femara x 5 years, 9/2015 - 9/2020, [facility] 2) Stage IIA1 Poorly to Moderately Differentiated Squamous Cell carcinoma of cervix, S/P chemo/RT on 9/2020  PLAN: Previous notes reviewed and all relevant laboratory and radiology results discussed with and communicated to the patient.  #H/O microinvasive hormone receptor positive breast cancer   last mammo/breast US on 6/12/24 showed BIRAD4.   6/19/2024 Breast, right mass, ultrasound-guided needle core biopsies: (top-hat) - Invasive well-differentiated ductal carcinoma with focal lobular features. Ductal carcinoma in situ (DCIS), solid and cribriform types with microcalcifications low nuclear grade. Rt breast lumpectomy done with Dr. Pyle on 7/30/24.  We also discussed endocrine therapy with Aromatase Inhibitors, such as, Exemestane. We discussed the side effects which may include but not limit to muscular and skeletal aching, arthralgia, loss of bone density, osteopenia and osteoporosis, a small increase risk of cardiovascular events and slightly increase of hyperlipidemia. Exemestane was e-prescribed.  - Continue to follow up with PCP and GI for health maintenance.  #Stage IIA1 poorly to moderately differentiated squamous cell carcinoma of cervix - Repeat PET scan (12/7/2020) showed favorable response although the cervical mass is not regressed significantly, FDG neg though. - Emphasized to follow up with gyn/onc.  #Current smoker - Healthy lifestyle with emphasis on smoking cessation discussed. Pt agreed to seek referral [facility] [phone] last visit but did not go.  RTC in 6 weeks with Dr. Hall.  Case was seen and discussed with Dr. Hall who agreed with the assessment and plan.

## 2024-09-20 NOTE — HISTORY OF PRESENT ILLNESS
[Disease: _____________________] : Disease: [unfilled] [T: ___] : T[unfilled] [N: ___] : N[unfilled] [M: ___] : M[unfilled] [AJCC Stage: ____] : AJCC Stage: [unfilled] [de-identified] : Patient is a 60 year old year-old postmenopausal female transferring her care to me.   She has history of ER and WI positive 0.6 mm well, differentiated, microinvasive ductal carcinoma  ER/[place] (+), HER2 (-). status post surgery currently on adjuvant Femara started in July 2015.  She is s/p Right NLOC/SNB/RF Spect/XOFT 06/15/15 - 0/1 (-); DCIS intermed grade, no residual IDC; (-) margins Patient is taking calcium and vitamin D . She is refusing Pap smears. No FHx breast/ovarian cancer.  [de-identified] : IDC [de-identified] : ER and MN positive, and her-2 negative [de-identified] : < 1mm (microinvasive)\par  UOQ [de-identified] : She has not been here since 11/18/16. However she states she has been compliant with Femara. She does not exercise. Her appetite is good. She denies any adverse events.  7/30/18: Doing well. Mammo in July 2018 was normal. DEXA in July 2018 showed osteopenia.  C/o burning and pain while passing urine. Started few days ago, resolved by itself and recurred last night. Did not take any Abx for UTI.  Compliant with Letrozole. Denies fever, nausea, vomiting, chest pain, SOB, abdominal pain, bowel problems. Colonoscopy done in 2017 and it was normal.   01/28/2019 Patient is here for a follow up visit.  C/O of weight gain and increased appetite. But otherwise tolerating letrozole well.  Mammogram due in  july 2019.  Denies fever, nausea, vomiting, chest pain, SOB, abdominal pain, bowel problems. Colonoscopy done in 2017 and it was normal.   7/8/19 pt is here for follow up. Denies fever, nausea, vomiting, chest pain, SOB, abdominal pain, bowel problems. she had felt a lump under her left axilla, had mamamo/usg done neg as noted below. compliant with Femara. says she was diagnosed with iron deficiency last October??. states she was advised IV iron but has been taking po iron. Not taking Ca + D After extensive questioning pt finally admitted that she has been having vaginal spotting for several months. She has not seen a GYN in a long time  2/10/20  Patient here for follow up.  She denies any new complaints.  Patient denies any new palpable breast lumps or pain, denies skin changes, denies nipple discharge.  Patient denies cough, shortness of breath, denies fever, denies bone pain. compliant with letrozole. Still has vaginal spotting off and on. Did not go for USG of pelvis or GYN eval as advised previously. Pt states that this is not her first priority. Continues to smoke. No abdominal pain  4/13/2020: The patient is here for follow up . She remains on letrozole since July 2015. She denies fever, chills, no weight loss, no chest pain or shortness of breath . She is actively smoking. 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 She underwent conization with endometrial biopsy and vaginal exam on 3/2/2020 . 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 The patient is still reporting vaginal spotting intermittently  5/7/20 Pt is here for follow up. She has completed MRI pelvis and was seen in Rad/Onc. No new symptoms  06/02/2020 JUAN ANTONIO OSHEA a 59 year F is here today for follow up of Breast Ca and cervical cancer. Has been complaint with femara since 9/2015 vitamin and calcium.  Pt does not want to continue Femara while on Chemo and RT.  Patient denies any new palpable breast lumps or pain, denies skin changes, denies nipple discharge.  Denies vaginal bleeding, bloating, abdominal pain. Denies dysuria, fever, chills.  She started RT today and will start weekly cisplatin today.   She had PET on 5/14: Focal FDG activity, proximal left lower lobe associated with approximate 8 mm branch point nodule (axial image 195, max SUV 4.2). Follow-up diagnostic CT scan 3 months time recommended. New FDG avid 8 mm left pelvic sidewall lymph node (axial 103, max SUV 9.8), suspicious for biologic tumor activity.  CBC reviewed.   6/8/20 Pt is here for follow up. She is s/p 1 weekly dose of Cisplatin which she tolerated well. Her only complaint is of heart burn since last week. In addition she has been experiencing insomnia. No N, V, D. Had mild vaginal bleeding. No abd pain.  6/15/20 Pt is here for follow up. Is s/p 2 weekly treatments of cisplatin. Tolerating it well. No N or V. Had diarrhea last week which resolved spontaneously. No abd pain or vag bleeding. No fever. C/o insomnia, has been drinking a lot of caffeinated beverages  6/29/20 pT is here for follow up. Tolerating chemo well. No reports of N, V. Has loose BMs occasionally well managed with Imodium. No vag bleeding  7/6/20 Pt is here for follow up. No new complaints. Will be finishing RT tomorrow. No vag bleeding or pelvic pain. No fever, N, V, D compliant with femara  7/13/20- Juan Antonio is here for for follow up. She remains on femara, Ca and Vitamin D,. she still has not got the US breast yet. She continues to smoke. She completed radiation EBRT and is planned for brachytherapy. Her appetite is good. With the last treatment she started having diarrhea, nausea which is better when she takes prn meds. Pepcid helping with reflux No vaginal bleeding.   9/1/2020 Patient is here for a follow-up visit for Breast and cervical cancer.   Patient denies fever, chills, nausea, vomiting, new pain or bleeding. She continues to take Femara, Calcium and Vitamin D.  She is approaching 5 years on Femara at the end of Sept 2020. She is anticipating brachytherapy with Dr. Delacruz.   DEXA showed osteopenia in AP spine, otherwise normal.   Patient reports she was unable to perform 2nd brachtherapy dose due to issues with IV access but is scheduled again for later this week. Reviewed most recent diagnostic mammo + sono from 08/24/2020 which was BI-RADS 2: Benign.  10/13/20 Pt is here for follow up. Has completed RT and chemo for cervical cancer. No vaginal bleeding. No abd pain, N, V,. D No breast related complaints.  12/17/20 Pt is here for follow up via teleheath. Has no new complaints. Denies vaginal bleeding, abdominal pain, N, V. D, urinary symptoms. Appetite is good. No bowel issues  2/23/2021 Patient is here to follow up for breast cancer and cervical cancer She is feeling well today, denies any new breast lump or pain, skin changes or nipple discharge She denies abdominal pain, bloating, nausea, vomiting, vaginal bleeding or discharge She completed RT in September 2020 She is updated with mammogram and bone density  6/22/021 Pt is here to follow up for breast and cervical cancer She feels well today. appetite is good She denies any new breast lump/mass, skin changes or nipple discharge, no abdominal pain, bloating, vaginal discharge or bleeding She received 2 doses of COVID vaccine She is UTD with mammo and gyne  11/28/22 Pt is here for a follow up after a long hiatus of over a year. denies vaginal bleeding, pelvic pain, breast masses. Has not seen GYN in over a year  5/16/23 Patient is here to follow up for breast and cervical cancer. She feels well, denies any breast related symptoms. She denies abdominal pain, nausea, vomiting or vaginal bleeding/abnormal discharge. She has not seen gyne since 2/2021.  11/14/23 Patient is here to follow up for breast and cervical cancer. She feels well, denies any breast related symptoms. She denies abdominal pain, nausea, vomiting or vaginal bleeding/abnormal discharge. She has not seen gyne since 2/2021, last colonoscopy? She is UTD with PCP. She had just seen PCP yesterday due to asthma and was started on Prednisone and antibiotics. She is due for mammogram. She smokes 5 cigs/day, declined to be referred to Smoking Cessation .  5/28/24 Patient is here to follow up for breast and cervical cancer. She feels well, denies any breast related symptoms. She denies abdominal pain, nausea, vomiting or vaginal bleeding/abnormal discharge. She has not seen gyne since 2/2021, last colonoscopy? She is UTD with PCP.  She is due for mammogram. Pt still has to make appt. She smokes 5 cigs/day, agreed to seek referral [facility]. [phone]  7/25/24 Patient is here to follow up for breast cancer prior to her lumpectomy with Dr. Pyle. She feels well, denies any breast related symptoms. She denies abdominal pain, nausea, vomiting or vaginal bleeding/abnormal discharge. However, last mammo/breast US on 6/12/24 showed BIRAD4.   6/19/2024 Breast, right mass, ultrasound-guided needle core biopsies: (top-hat) - Invasive well-differentiated ductal carcinoma with focal lobular features. Ductal carcinoma in situ (DCIS), solid and cribriform types with microcalcifications low nuclear grade.  9/12/24 Pt is here today for follow up visit. She had lumpectomy 7/30/24 with Dr. Pyle- tolerated well, no complications Pt declines radiation but will consider endocrine therapy hx of right breast cancer ([facility]ORT in 2015), cervical cancer s/p definitive chemoRT in 2020 now presenting with a new [id number] IDC of the right breast, G2, ER/NC positive, HER2 negative, near the prior tumor bed. She is s/p lumpectomy on 7/30/24 with negative margins and negative SLNs.

## 2024-10-18 ENCOUNTER — APPOINTMENT (OUTPATIENT)
Dept: RADIATION ONCOLOGY | Facility: HOSPITAL | Age: 64
End: 2024-10-18

## 2024-12-17 ENCOUNTER — APPOINTMENT (OUTPATIENT)
Age: 64
End: 2024-12-17

## 2025-01-09 ENCOUNTER — RESULT REVIEW (OUTPATIENT)
Age: 65
End: 2025-01-09

## 2025-01-09 ENCOUNTER — OUTPATIENT (OUTPATIENT)
Dept: OUTPATIENT SERVICES | Facility: HOSPITAL | Age: 65
LOS: 1 days | End: 2025-01-09
Payer: MEDICARE

## 2025-01-09 DIAGNOSIS — Z98.890 OTHER SPECIFIED POSTPROCEDURAL STATES: Chronic | ICD-10-CM

## 2025-01-09 DIAGNOSIS — Z90.49 ACQUIRED ABSENCE OF OTHER SPECIFIED PARTS OF DIGESTIVE TRACT: Chronic | ICD-10-CM

## 2025-01-09 DIAGNOSIS — C50.911 MALIGNANT NEOPLASM OF UNSPECIFIED SITE OF RIGHT FEMALE BREAST: ICD-10-CM

## 2025-01-09 DIAGNOSIS — Z12.31 ENCOUNTER FOR SCREENING MAMMOGRAM FOR MALIGNANT NEOPLASM OF BREAST: ICD-10-CM

## 2025-01-09 DIAGNOSIS — Z90.89 ACQUIRED ABSENCE OF OTHER ORGANS: Chronic | ICD-10-CM

## 2025-01-09 PROCEDURE — 77065 DX MAMMO INCL CAD UNI: CPT | Mod: RT

## 2025-01-09 PROCEDURE — G0279: CPT

## 2025-01-09 PROCEDURE — 77061 BREAST TOMOSYNTHESIS UNI: CPT | Mod: 26

## 2025-01-09 PROCEDURE — 77065 DX MAMMO INCL CAD UNI: CPT | Mod: 26,RT

## 2025-01-10 DIAGNOSIS — Z12.31 ENCOUNTER FOR SCREENING MAMMOGRAM FOR MALIGNANT NEOPLASM OF BREAST: ICD-10-CM

## 2025-01-21 ENCOUNTER — APPOINTMENT (OUTPATIENT)
Dept: BREAST CENTER | Facility: CLINIC | Age: 65
End: 2025-01-21

## 2025-02-19 ENCOUNTER — APPOINTMENT (OUTPATIENT)
Dept: BREAST CENTER | Facility: CLINIC | Age: 65
End: 2025-02-19
Payer: MEDICARE

## 2025-02-19 VITALS
DIASTOLIC BLOOD PRESSURE: 85 MMHG | HEIGHT: 66 IN | WEIGHT: 179 LBS | SYSTOLIC BLOOD PRESSURE: 128 MMHG | HEART RATE: 102 BPM | BODY MASS INDEX: 28.77 KG/M2

## 2025-02-19 DIAGNOSIS — Z17.0 MALIGNANT NEOPLASM OF UPPER-OUTER QUADRANT OF RIGHT FEMALE BREAST: ICD-10-CM

## 2025-02-19 DIAGNOSIS — C50.411 MALIGNANT NEOPLASM OF UPPER-OUTER QUADRANT OF RIGHT FEMALE BREAST: ICD-10-CM

## 2025-02-19 PROCEDURE — 99214 OFFICE O/P EST MOD 30 MIN: CPT

## 2025-03-31 ENCOUNTER — OUTPATIENT (OUTPATIENT)
Dept: OUTPATIENT SERVICES | Facility: HOSPITAL | Age: 65
LOS: 1 days | End: 2025-03-31
Payer: MEDICARE

## 2025-03-31 ENCOUNTER — APPOINTMENT (OUTPATIENT)
Dept: OBGYN | Facility: CLINIC | Age: 65
End: 2025-03-31
Payer: MEDICARE

## 2025-03-31 VITALS
BODY MASS INDEX: 28.28 KG/M2 | WEIGHT: 176 LBS | HEIGHT: 66 IN | DIASTOLIC BLOOD PRESSURE: 80 MMHG | SYSTOLIC BLOOD PRESSURE: 130 MMHG

## 2025-03-31 DIAGNOSIS — Z98.890 OTHER SPECIFIED POSTPROCEDURAL STATES: Chronic | ICD-10-CM

## 2025-03-31 DIAGNOSIS — Z90.89 ACQUIRED ABSENCE OF OTHER ORGANS: Chronic | ICD-10-CM

## 2025-03-31 DIAGNOSIS — Z01.419 ENCOUNTER FOR GYNECOLOGICAL EXAMINATION (GENERAL) (ROUTINE) WITHOUT ABNORMAL FINDINGS: ICD-10-CM

## 2025-03-31 DIAGNOSIS — Z01.419 ENCOUNTER FOR GYNECOLOGICAL EXAMINATION (GENERAL) (ROUTINE) W/OUT ABNORMAL FINDINGS: ICD-10-CM

## 2025-03-31 DIAGNOSIS — Z90.49 ACQUIRED ABSENCE OF OTHER SPECIFIED PARTS OF DIGESTIVE TRACT: Chronic | ICD-10-CM

## 2025-03-31 PROCEDURE — 88142 CYTOPATH C/V THIN LAYER: CPT

## 2025-03-31 PROCEDURE — 99387 INIT PM E/M NEW PAT 65+ YRS: CPT

## 2025-03-31 PROCEDURE — 99397 PER PM REEVAL EST PAT 65+ YR: CPT

## 2025-03-31 PROCEDURE — 99459 PELVIC EXAMINATION: CPT

## 2025-03-31 PROCEDURE — 87624 HPV HI-RISK TYP POOLED RSLT: CPT

## 2025-04-01 ENCOUNTER — OUTPATIENT (OUTPATIENT)
Dept: OUTPATIENT SERVICES | Facility: HOSPITAL | Age: 65
LOS: 1 days | End: 2025-04-01

## 2025-04-01 ENCOUNTER — APPOINTMENT (OUTPATIENT)
Age: 65
End: 2025-04-01
Payer: MEDICARE

## 2025-04-01 ENCOUNTER — OUTPATIENT (OUTPATIENT)
Dept: OUTPATIENT SERVICES | Facility: HOSPITAL | Age: 65
LOS: 1 days | End: 2025-04-01
Payer: MEDICARE

## 2025-04-01 VITALS
WEIGHT: 176 LBS | TEMPERATURE: 98 F | HEART RATE: 109 BPM | SYSTOLIC BLOOD PRESSURE: 141 MMHG | BODY MASS INDEX: 28.28 KG/M2 | HEIGHT: 66 IN | DIASTOLIC BLOOD PRESSURE: 74 MMHG | OXYGEN SATURATION: 95 %

## 2025-04-01 DIAGNOSIS — C53.9 MALIGNANT NEOPLASM OF CERVIX UTERI, UNSPECIFIED: ICD-10-CM

## 2025-04-01 DIAGNOSIS — D05.11 INTRADUCTAL CARCINOMA IN SITU OF RIGHT BREAST: ICD-10-CM

## 2025-04-01 DIAGNOSIS — C50.911 MALIGNANT NEOPLASM OF UNSPECIFIED SITE OF RIGHT FEMALE BREAST: ICD-10-CM

## 2025-04-01 DIAGNOSIS — Z98.890 OTHER SPECIFIED POSTPROCEDURAL STATES: Chronic | ICD-10-CM

## 2025-04-01 DIAGNOSIS — Z17.0 MALIGNANT NEOPLASM OF UPPER-OUTER QUADRANT OF RIGHT FEMALE BREAST: ICD-10-CM

## 2025-04-01 DIAGNOSIS — C50.411 MALIGNANT NEOPLASM OF UPPER-OUTER QUADRANT OF RIGHT FEMALE BREAST: ICD-10-CM

## 2025-04-01 DIAGNOSIS — Z90.89 ACQUIRED ABSENCE OF OTHER ORGANS: Chronic | ICD-10-CM

## 2025-04-01 DIAGNOSIS — Z00.00 ENCOUNTER FOR GENERAL ADULT MEDICAL EXAMINATION WITHOUT ABNORMAL FINDINGS: ICD-10-CM

## 2025-04-01 DIAGNOSIS — Z90.49 ACQUIRED ABSENCE OF OTHER SPECIFIED PARTS OF DIGESTIVE TRACT: Chronic | ICD-10-CM

## 2025-04-01 DIAGNOSIS — Z79.811 LONG TERM (CURRENT) USE OF AROMATASE INHIBITORS: ICD-10-CM

## 2025-04-01 PROCEDURE — G2211 COMPLEX E/M VISIT ADD ON: CPT

## 2025-04-01 PROCEDURE — 99214 OFFICE O/P EST MOD 30 MIN: CPT

## 2025-04-02 DIAGNOSIS — Z00.00 ENCOUNTER FOR GENERAL ADULT MEDICAL EXAMINATION WITHOUT ABNORMAL FINDINGS: ICD-10-CM

## 2025-04-02 DIAGNOSIS — C53.9 MALIGNANT NEOPLASM OF CERVIX UTERI, UNSPECIFIED: ICD-10-CM

## 2025-04-02 LAB — HPV HIGH+LOW RISK DNA PNL CVX: NOT DETECTED

## 2025-04-10 LAB — CYTOLOGY CVX/VAG DOC THIN PREP: ABNORMAL

## 2025-07-11 ENCOUNTER — RESULT REVIEW (OUTPATIENT)
Age: 65
End: 2025-07-11

## 2025-07-11 ENCOUNTER — OUTPATIENT (OUTPATIENT)
Dept: OUTPATIENT SERVICES | Facility: HOSPITAL | Age: 65
LOS: 1 days | End: 2025-07-11
Payer: MEDICARE

## 2025-07-11 DIAGNOSIS — Z98.890 OTHER SPECIFIED POSTPROCEDURAL STATES: Chronic | ICD-10-CM

## 2025-07-11 DIAGNOSIS — Z90.89 ACQUIRED ABSENCE OF OTHER ORGANS: Chronic | ICD-10-CM

## 2025-07-11 DIAGNOSIS — R92.8 OTHER ABNORMAL AND INCONCLUSIVE FINDINGS ON DIAGNOSTIC IMAGING OF BREAST: ICD-10-CM

## 2025-07-11 DIAGNOSIS — C50.911 MALIGNANT NEOPLASM OF UNSPECIFIED SITE OF RIGHT FEMALE BREAST: ICD-10-CM

## 2025-07-11 DIAGNOSIS — D05.11 INTRADUCTAL CARCINOMA IN SITU OF RIGHT BREAST: ICD-10-CM

## 2025-07-11 DIAGNOSIS — Z90.49 ACQUIRED ABSENCE OF OTHER SPECIFIED PARTS OF DIGESTIVE TRACT: Chronic | ICD-10-CM

## 2025-07-11 PROCEDURE — 77062 BREAST TOMOSYNTHESIS BI: CPT | Mod: 26

## 2025-07-11 PROCEDURE — G0279: CPT

## 2025-07-11 PROCEDURE — 77066 DX MAMMO INCL CAD BI: CPT | Mod: 26

## 2025-07-11 PROCEDURE — 77066 DX MAMMO INCL CAD BI: CPT

## 2025-07-12 DIAGNOSIS — R92.8 OTHER ABNORMAL AND INCONCLUSIVE FINDINGS ON DIAGNOSTIC IMAGING OF BREAST: ICD-10-CM

## 2025-07-14 ENCOUNTER — NON-APPOINTMENT (OUTPATIENT)
Age: 65
End: 2025-07-14

## 2025-07-15 ENCOUNTER — APPOINTMENT (OUTPATIENT)
Dept: BREAST CENTER | Facility: CLINIC | Age: 65
End: 2025-07-15
Payer: MEDICARE

## 2025-07-15 VITALS
HEIGHT: 66 IN | SYSTOLIC BLOOD PRESSURE: 147 MMHG | BODY MASS INDEX: 28.93 KG/M2 | DIASTOLIC BLOOD PRESSURE: 88 MMHG | WEIGHT: 180 LBS

## 2025-07-15 PROCEDURE — 99214 OFFICE O/P EST MOD 30 MIN: CPT
